# Patient Record
Sex: MALE | Race: BLACK OR AFRICAN AMERICAN | NOT HISPANIC OR LATINO | Employment: FULL TIME | URBAN - METROPOLITAN AREA
[De-identification: names, ages, dates, MRNs, and addresses within clinical notes are randomized per-mention and may not be internally consistent; named-entity substitution may affect disease eponyms.]

---

## 2019-08-02 DIAGNOSIS — Z76.82 ORGAN TRANSPLANT CANDIDATE: Primary | ICD-10-CM

## 2019-08-05 ENCOUNTER — TELEPHONE (OUTPATIENT)
Dept: TRANSPLANT | Facility: CLINIC | Age: 54
End: 2019-08-05

## 2019-08-13 ENCOUNTER — TELEPHONE (OUTPATIENT)
Dept: TRANSPLANT | Facility: CLINIC | Age: 54
End: 2019-08-13

## 2019-08-16 ENCOUNTER — TELEPHONE (OUTPATIENT)
Dept: TRANSPLANT | Facility: CLINIC | Age: 54
End: 2019-08-16

## 2019-08-21 DIAGNOSIS — Z76.82 ORGAN TRANSPLANT CANDIDATE: Primary | ICD-10-CM

## 2019-10-01 ENCOUNTER — HOSPITAL ENCOUNTER (OUTPATIENT)
Dept: RADIOLOGY | Facility: HOSPITAL | Age: 54
Discharge: HOME | End: 2019-10-01
Attending: NURSE PRACTITIONER
Payer: COMMERCIAL

## 2019-10-01 ENCOUNTER — CLINICAL SUPPORT (OUTPATIENT)
Dept: INFECTIOUS DISEASES | Facility: CLINIC | Age: 54
End: 2019-10-01
Payer: COMMERCIAL

## 2019-10-01 ENCOUNTER — OFFICE VISIT (OUTPATIENT)
Dept: TRANSPLANT | Facility: CLINIC | Age: 54
End: 2019-10-01
Payer: COMMERCIAL

## 2019-10-01 VITALS
WEIGHT: 218.06 LBS | HEIGHT: 65 IN | RESPIRATION RATE: 16 BRPM | SYSTOLIC BLOOD PRESSURE: 171 MMHG | DIASTOLIC BLOOD PRESSURE: 63 MMHG | TEMPERATURE: 98 F | OXYGEN SATURATION: 98 % | HEART RATE: 47 BPM | BODY MASS INDEX: 36.33 KG/M2

## 2019-10-01 DIAGNOSIS — N18.5 CKD (CHRONIC KIDNEY DISEASE), STAGE V: Chronic | ICD-10-CM

## 2019-10-01 DIAGNOSIS — Z76.82 ORGAN TRANSPLANT CANDIDATE: ICD-10-CM

## 2019-10-01 DIAGNOSIS — E11.22 CONTROLLED TYPE 2 DIABETES MELLITUS WITH STAGE 5 CHRONIC KIDNEY DISEASE NOT ON CHRONIC DIALYSIS, WITHOUT LONG-TERM CURRENT USE OF INSULIN: Chronic | ICD-10-CM

## 2019-10-01 DIAGNOSIS — N18.5 CONTROLLED TYPE 2 DIABETES MELLITUS WITH STAGE 5 CHRONIC KIDNEY DISEASE NOT ON CHRONIC DIALYSIS, WITHOUT LONG-TERM CURRENT USE OF INSULIN: Chronic | ICD-10-CM

## 2019-10-01 DIAGNOSIS — N18.5 CHRONIC KIDNEY DISEASE (CKD), STAGE V: ICD-10-CM

## 2019-10-01 DIAGNOSIS — Z01.818 PRE-TRANSPLANT EVALUATION FOR CHRONIC KIDNEY DISEASE: ICD-10-CM

## 2019-10-01 DIAGNOSIS — Z01.818 PRE-TRANSPLANT EVALUATION FOR CHRONIC KIDNEY DISEASE: Primary | ICD-10-CM

## 2019-10-01 DIAGNOSIS — I12.9 RENAL HYPERTENSION: Chronic | ICD-10-CM

## 2019-10-01 DIAGNOSIS — G45.9 TIA (TRANSIENT ISCHEMIC ATTACK): Chronic | ICD-10-CM

## 2019-10-01 LAB
BACTERIA #/AREA URNS AUTO: ABNORMAL /HPF
BILIRUB UR QL STRIP: NEGATIVE
CLARITY UR REFRACT.AUTO: CLEAR
COLOR UR AUTO: ABNORMAL
CREAT UR-MCNC: 51 MG/DL (ref 23–375)
GLUCOSE UR QL STRIP: NEGATIVE
HGB UR QL STRIP: ABNORMAL
HYALINE CASTS UR QL AUTO: 1 /LPF
KETONES UR QL STRIP: NEGATIVE
LEUKOCYTE ESTERASE UR QL STRIP: NEGATIVE
MICROSCOPIC COMMENT: ABNORMAL
NITRITE UR QL STRIP: NEGATIVE
PH UR STRIP: 6 [PH] (ref 5–8)
PROT UR QL STRIP: ABNORMAL
PROT UR-MCNC: 199 MG/DL (ref 0–15)
PROT/CREAT UR: 3.9 MG/G{CREAT} (ref 0–0.2)
RBC #/AREA URNS AUTO: 5 /HPF (ref 0–4)
SP GR UR STRIP: 1.01 (ref 1–1.03)
SQUAMOUS #/AREA URNS AUTO: 0 /HPF
URN SPEC COLLECT METH UR: ABNORMAL
WBC #/AREA URNS AUTO: 4 /HPF (ref 0–5)

## 2019-10-01 PROCEDURE — 3044F HG A1C LEVEL LT 7.0%: CPT | Mod: CPTII,S$GLB,TXP, | Performed by: INTERNAL MEDICINE

## 2019-10-01 PROCEDURE — 99203 PR OFFICE/OUTPT VISIT, NEW, LEVL III, 30-44 MIN: ICD-10-PCS | Mod: S$GLB,TXP,, | Performed by: TRANSPLANT SURGERY

## 2019-10-01 PROCEDURE — 3044F PR MOST RECENT HEMOGLOBIN A1C LEVEL <7.0%: ICD-10-PCS | Mod: CPTII,S$GLB,TXP, | Performed by: INTERNAL MEDICINE

## 2019-10-01 PROCEDURE — 99999 PR PBB SHADOW E&M-EST. PATIENT-LVL IV: CPT | Mod: PBBFAC,TXP,, | Performed by: INTERNAL MEDICINE

## 2019-10-01 PROCEDURE — 99204 PR OFFICE/OUTPT VISIT, NEW, LEVL IV, 45-59 MIN: ICD-10-PCS | Mod: 25,S$GLB,TXP, | Performed by: PHYSICIAN ASSISTANT

## 2019-10-01 PROCEDURE — 93978 VASCULAR STUDY: CPT | Mod: TC,TXP

## 2019-10-01 PROCEDURE — 90472 IMMUNIZATION ADMIN EACH ADD: CPT | Mod: S$GLB,TXP,, | Performed by: INTERNAL MEDICINE

## 2019-10-01 PROCEDURE — 90471 FLU VACCINE (QUAD) GREATER THAN OR EQUAL TO 3YO PRESERVATIVE FREE IM: ICD-10-PCS | Mod: S$GLB,TXP,, | Performed by: INTERNAL MEDICINE

## 2019-10-01 PROCEDURE — 90471 IMMUNIZATION ADMIN: CPT | Mod: S$GLB,TXP,, | Performed by: INTERNAL MEDICINE

## 2019-10-01 PROCEDURE — 3008F PR BODY MASS INDEX (BMI) DOCUMENTED: ICD-10-PCS | Mod: CPTII,S$GLB,TXP, | Performed by: INTERNAL MEDICINE

## 2019-10-01 PROCEDURE — 90715 TDAP VACCINE 7 YRS/> IM: CPT | Mod: S$GLB,TXP,, | Performed by: INTERNAL MEDICINE

## 2019-10-01 PROCEDURE — 72170 X-RAY EXAM OF PELVIS: CPT | Mod: TC,TXP

## 2019-10-01 PROCEDURE — 71046 X-RAY EXAM CHEST 2 VIEWS: CPT | Mod: TC,TXP

## 2019-10-01 PROCEDURE — 72170 XR PELVIS ROUTINE AP: ICD-10-PCS | Mod: 26,TXP,, | Performed by: RADIOLOGY

## 2019-10-01 PROCEDURE — 90715 TDAP VACCINE GREATER THAN OR EQUAL TO 7YO IM: ICD-10-PCS | Mod: S$GLB,TXP,, | Performed by: INTERNAL MEDICINE

## 2019-10-01 PROCEDURE — 76770 US EXAM ABDO BACK WALL COMP: CPT | Mod: 59,TC,TXP

## 2019-10-01 PROCEDURE — 3077F PR MOST RECENT SYSTOLIC BLOOD PRESSURE >= 140 MM HG: ICD-10-PCS | Mod: CPTII,S$GLB,TXP, | Performed by: INTERNAL MEDICINE

## 2019-10-01 PROCEDURE — 97802 MEDICAL NUTRITION INDIV IN: CPT | Mod: S$GLB,TXP,, | Performed by: DIETITIAN, REGISTERED

## 2019-10-01 PROCEDURE — 99999 PR PBB SHADOW E&M-EST. PATIENT-LVL IV: ICD-10-PCS | Mod: PBBFAC,TXP,, | Performed by: INTERNAL MEDICINE

## 2019-10-01 PROCEDURE — 90686 IIV4 VACC NO PRSV 0.5 ML IM: CPT | Mod: S$GLB,TXP,, | Performed by: INTERNAL MEDICINE

## 2019-10-01 PROCEDURE — 93978 VASCULAR STUDY: CPT | Mod: 26,TXP,, | Performed by: RADIOLOGY

## 2019-10-01 PROCEDURE — 3078F PR MOST RECENT DIASTOLIC BLOOD PRESSURE < 80 MM HG: ICD-10-PCS | Mod: CPTII,S$GLB,TXP, | Performed by: INTERNAL MEDICINE

## 2019-10-01 PROCEDURE — 90636 HEP A/HEP B VACC ADULT IM: CPT | Mod: S$GLB,TXP,, | Performed by: INTERNAL MEDICINE

## 2019-10-01 PROCEDURE — 3077F SYST BP >= 140 MM HG: CPT | Mod: CPTII,S$GLB,TXP, | Performed by: INTERNAL MEDICINE

## 2019-10-01 PROCEDURE — 76770 US RETROPERITONEAL COMPLETE: ICD-10-PCS | Mod: 26,59,TXP, | Performed by: RADIOLOGY

## 2019-10-01 PROCEDURE — 99205 OFFICE O/P NEW HI 60 MIN: CPT | Mod: S$GLB,TXP,, | Performed by: INTERNAL MEDICINE

## 2019-10-01 PROCEDURE — 71046 XR CHEST PA AND LATERAL: ICD-10-PCS | Mod: 26,TXP,, | Performed by: RADIOLOGY

## 2019-10-01 PROCEDURE — 90670 PNEUMOCOCCAL CONJUGATE VACCINE 13-VALENT LESS THAN 5YO & GREATER THAN: ICD-10-PCS | Mod: S$GLB,TXP,, | Performed by: INTERNAL MEDICINE

## 2019-10-01 PROCEDURE — 99203 OFFICE O/P NEW LOW 30 MIN: CPT | Mod: S$GLB,TXP,, | Performed by: TRANSPLANT SURGERY

## 2019-10-01 PROCEDURE — 71046 X-RAY EXAM CHEST 2 VIEWS: CPT | Mod: 26,TXP,, | Performed by: RADIOLOGY

## 2019-10-01 PROCEDURE — 90686 FLU VACCINE (QUAD) GREATER THAN OR EQUAL TO 3YO PRESERVATIVE FREE IM: ICD-10-PCS | Mod: S$GLB,TXP,, | Performed by: INTERNAL MEDICINE

## 2019-10-01 PROCEDURE — 3078F DIAST BP <80 MM HG: CPT | Mod: CPTII,S$GLB,TXP, | Performed by: INTERNAL MEDICINE

## 2019-10-01 PROCEDURE — 93978 US DOPP ILIACS BILATERAL: ICD-10-PCS | Mod: 26,TXP,, | Performed by: RADIOLOGY

## 2019-10-01 PROCEDURE — 72170 X-RAY EXAM OF PELVIS: CPT | Mod: 26,TXP,, | Performed by: RADIOLOGY

## 2019-10-01 PROCEDURE — 90670 PCV13 VACCINE IM: CPT | Mod: S$GLB,TXP,, | Performed by: INTERNAL MEDICINE

## 2019-10-01 PROCEDURE — 3008F BODY MASS INDEX DOCD: CPT | Mod: CPTII,S$GLB,TXP, | Performed by: INTERNAL MEDICINE

## 2019-10-01 PROCEDURE — 99205 PR OFFICE/OUTPT VISIT, NEW, LEVL V, 60-74 MIN: ICD-10-PCS | Mod: S$GLB,TXP,, | Performed by: INTERNAL MEDICINE

## 2019-10-01 PROCEDURE — 90472 HEPATITIS A HEPATITIS B COMBINED VACCINE IM: ICD-10-PCS | Mod: S$GLB,TXP,, | Performed by: INTERNAL MEDICINE

## 2019-10-01 PROCEDURE — 97802 PR MED NUTR THER, 1ST, INDIV, EA 15 MIN: ICD-10-PCS | Mod: S$GLB,TXP,, | Performed by: DIETITIAN, REGISTERED

## 2019-10-01 PROCEDURE — 99204 OFFICE O/P NEW MOD 45 MIN: CPT | Mod: 25,S$GLB,TXP, | Performed by: PHYSICIAN ASSISTANT

## 2019-10-01 PROCEDURE — 76770 US EXAM ABDO BACK WALL COMP: CPT | Mod: 26,59,TXP, | Performed by: RADIOLOGY

## 2019-10-01 PROCEDURE — 81001 URINALYSIS AUTO W/SCOPE: CPT | Mod: TXP

## 2019-10-01 PROCEDURE — 82570 ASSAY OF URINE CREATININE: CPT | Mod: TXP

## 2019-10-01 PROCEDURE — 90636 HEPATITIS A HEPATITIS B COMBINED VACCINE IM: ICD-10-PCS | Mod: S$GLB,TXP,, | Performed by: INTERNAL MEDICINE

## 2019-10-01 RX ORDER — ATORVASTATIN CALCIUM 40 MG/1
40 TABLET, FILM COATED ORAL NIGHTLY
COMMUNITY

## 2019-10-01 RX ORDER — METOPROLOL TARTRATE 100 MG/1
100 TABLET ORAL 2 TIMES DAILY
COMMUNITY
End: 2022-09-19 | Stop reason: CLARIF

## 2019-10-01 RX ORDER — METOPROLOL SUCCINATE 100 MG/1
100 TABLET, EXTENDED RELEASE ORAL DAILY
COMMUNITY
End: 2019-10-01 | Stop reason: ALTCHOICE

## 2019-10-01 RX ORDER — AMLODIPINE BESYLATE 10 MG/1
10 TABLET ORAL NIGHTLY
Status: ON HOLD | COMMUNITY
End: 2022-12-22 | Stop reason: ALTCHOICE

## 2019-10-01 RX ORDER — ALLOPURINOL 100 MG/1
50 TABLET ORAL DAILY
COMMUNITY

## 2019-10-01 RX ORDER — PIOGLITAZONEHYDROCHLORIDE 30 MG/1
30 TABLET ORAL DAILY
COMMUNITY

## 2019-10-01 RX ORDER — FUROSEMIDE 80 MG/1
80 TABLET ORAL DAILY
COMMUNITY
End: 2020-10-27

## 2019-10-01 RX ORDER — DOXAZOSIN 4 MG/1
4 TABLET ORAL DAILY
COMMUNITY

## 2019-10-01 NOTE — PROGRESS NOTES
received Tdap, Twinrix(first dose), Flu and Prevnar 13 vaccines. Tolerated well. Left unit in NAD.

## 2019-10-01 NOTE — PROGRESS NOTES
"TRANSPLANT NUTRITIONAL ASSESSMENT    Referring Provider: Tata Atkinson MD    Reason for Visit: Pre-kidney transplant work-up (pre-dialysis)    Age: 53 y.o.  Sex: male    There is no problem list on file for this patient.    Past Medical History:   Diagnosis Date    Diabetes mellitus     Hypertension      Lab Results   Component Value Date     10/01/2019    K 4.4 10/01/2019    PHOS 4.2 10/01/2019    CHOL 116 (L) 10/01/2019    HDL 38 (L) 10/01/2019    TRIG 101 10/01/2019    ALBUMIN 3.5 10/01/2019    HGBA1C 5.4 10/01/2019    CALCIUM 8.9 10/01/2019     Other Pertinent Labs: None    Current Outpatient Medications   Medication Sig    allopurinol (ZYLOPRIM) 100 MG tablet Take 100 mg by mouth once daily.    amLODIPine (NORVASC) 10 MG tablet Take 10 mg by mouth every evening.     atorvastatin (LIPITOR) 40 MG tablet Take 40 mg by mouth every evening.     doxazosin (CARDURA) 8 MG Tab Take 8 mg by mouth every 12 (twelve) hours.     furosemide (LASIX) 80 MG tablet Take 80 mg by mouth once daily.    metoprolol tartrate (LOPRESSOR) 100 MG tablet Take 100 mg by mouth 2 (two) times daily.    pioglitazone (ACTOS) 30 MG tablet Take 30 mg by mouth once daily.    rivaroxaban (XARELTO) 15 mg Tab Take 15 mg by mouth daily with dinner or evening meal.     No current facility-administered medications for this visit.      Allergies: Patient has no known allergies.    Ht Readings from Last 1 Encounters:   10/01/19 5' 5" (1.651 m)     Wt Readings from Last 1 Encounters:   10/01/19 98.9 kg (218 lb 0.6 oz)      BMI: Body mass index is 36.28 kg/m².    Usual Weight: 254lb  Weight Change/Time: -36lb x 8 months (intentional)  Current Diet: Low sodium, low sugar  Appetite/Current Intake: good   Exercise/Physical Activity: Exercising regular from March until July of this year, got fistula placed and has had more fatigue/muscle aches since July  Nutritional/Herbal Supplements: None  Potential Food/Medication Interactions: " Amlodipine - avoid natural licorice  Atorvastatin - avoid grapefruit  Chewing/Swallowing Problems: None  Symptoms: none  Assessment of Lab Values: Chol 116, HDL 38  Support System: Wife present, supportive of pt's nutrition care and cooks for pt    Estimated Kcal Need: 1978 kcal (20 kcal/kg)  Estimated Protein Need: 79 gm (0.8 gm/kg)    Nutritional History: Pt reports good appetite. Reports weight loss of 36lb since 2/2019 by following low sodium diet and overall eating more healthy diet. Was also exercising regularly during this time, but stopped in July after AV fistula placement. Also having more fatigue and muscle aches. Eats out 1x/week, tries to order things that are healthy and within sodium recommendations. Both wife and pt cook and do not use salt. Beverages include water. Limits red meat, shrimp for gout. Eats lots of fruit as snacks. Pt provided the following diet recall:  B: oatmeal cooked in water with strawberries, sugar, 1 tsp butter, coffee with honey and creamer  L: salad (Ranch or Bulgarian dressing, lettuce, tomato, cucumber, cheese, croutons) or tuna/turkey sandwich from [employer] or [employer] 1-2x/month  D: baked fish/chicken/pork/turkey (rarely fried), broccoli, salad, small portion rice or red/white beans    Nutritional Diagnoses  Problem: food- and nutrition-related knowledge deficit  Etiology: no previous education on K/Phos restrictions for CKD  Symptoms: diet recall, pt comments    Educational Need? yes  Barriers: none identified  Discussed with: patient and wife  Interventions: Patient taught nutrition information regarding Pre-kidney transplant work-up (pre-dialysis).  Renal Nutrition Therapy packet reviewed (high/low food sources of K, Phos and protein, low sodium and fluid intake, emphasis on moderate protein intake).  Goals/Recommendations: gradual weight loss, choose healthy options when dining out and aerobic exercises as medically able  Actions Taken: instruct/provide written  information  Strategies Used: problem solving, goal setting, motivational interviewing  Patient and/or family comprehend instructions: yes , adherence expected  Outcome: Verbalizes understanding  Monitoring: Contact information provided, will f/u in clinic and communicate with the care team as needed.     Counseling Time: 15 minutes

## 2019-10-01 NOTE — PROGRESS NOTES
Transplant Surgery  Kidney Transplant Recipient Evaluation    Referring Physician: Kamran Adorno  Current Nephrologist: Kamran Adorno    Subjective:     Reason for Visit: evaluate transplant candidacy    History of Present Illness: Chidi Lisa is a 53 y.o. year old male undergoing transplant evaluation.    Dialysis History: Chidi is pre-dialysis.      Transplant History: N/A    Etiology of Renal Disease: Diabetes Mellitus - Type II (based on medical records from referral).    Review of Systems    Objective:     Physical Exam:  Constitutional:   Vitals reviewed: yes   Well-nourished and well-groomed: yes  Eyes:   Sclerae icteric: no   Extraocular movements intact: yes  GI:    Bowel sounds normal: yes   Tenderness: no    If yes, quadrant/location: not applicable   Palpable masses: no    If yes, quadrant/location: not applicable   Hepatosplenomegaly: no   Ascites: no   Hernia: no    If yes, type/location: not applicable   Surgical scars: no    If yes, type/location: not applicable  Resp:   Effort normal: yes   Breath sounds normal: yes    CV:   Regular rate and rhythm: yes   Heart sounds normal: yes   Femoral pulses normal: yes   Extremities edematous: no  Skin:   Rashes or lesions present: no    If yes, describe:not applicable   Jaundice:: no    Musculoskeletal:   Gait normal: yes   Strength normal: yes  Psych:   Oriented to person, place, and time: yes   Affect and mood normal: yes    Additional comments: not applicable    Counseling: We provided Chidi Lisa with a group education session today.  We discussed kidney transplantation at length with him, including risks, potential complications, and alternatives in the management of his renal failure.  The discussion included complications related to anesthesia, bleeding, infection, primary nonfunction, and ATN.  I discussed the typical postoperative course, length of hospitalization, the need for long-term immunosuppression, and the need for long-term  routine follow-up.  I discussed living-donor and -donor transplantation and the relative advantages and disadvantages of each.  I also discussed average waiting times for both living donation and  donation.  I discussed national and center-specific survival rates.  I also mentioned the potential benefit of multicenter listing to candidates listed with centers within more than one organ procurement organization.  All questions were answered.    Final determination of transplant candidacy will be made once evaluation is complete and reviewed by the Kidney & Kidney/Pancreas Selection Committee.         Transplant Surgery - Candidacy   Assessment/Plan:   Chidi Lisa is pre-dialysis with CKD stage 4 (GFR 15-29 mL/min). I see no surgical contraindication to placing a kidney transplant. Based on available information, Chidi Lisa is a suitable kidney transplant candidate.     Eron Diane MD

## 2019-10-01 NOTE — PROGRESS NOTES
INITIAL PATIENT EDUCATION NOTE    Mr. Chidi Lisa was seen in pre-kidney transplant clinic for evaluation for kidney, kidney/pancreas or pancreas only transplant.  The patient attended a group education session that discussed/reviewed the following aspects of transplantation: evaluation and selection committee process, UNOS waitlist management/multiple listings, types of organs offered (KDPI < 85%, KDPI > 85%, PHS increased risk, DCD, HCV+), financial aspects, surgical procedures, dietary instruction pre- and post-transplant, health maintenance pre- and post-transplant, post-transplant hospitalization and outpatient follow-up, potential to participate in a research protocol, and medication management and side effects.  A question and answer session was provided after the presentation.    The patient was seen by all members of the multi-disciplinary team to include: Nephrologist/PA, Surgeon, , Transplant Coordinator, , Pharmacist and Dietician (if applicable).    The patient reviewed and signed all consents for evaluation which were witnessed and sent to scanning into the EPIC chart.    The patient was given an education book and plan for further evaluation based on his individual assessment.      The patient was encouraged to call with any questions or concerns.

## 2019-10-01 NOTE — PROGRESS NOTES
Seen and agree with  Dr. Hebert as documented in his attached note.  I have verified the history and examined the patient.  I concur with the Assessment and plan as outlined.       He was diagnosed with DM in 2003. + DM type II w retinopathy, nephropathy. TIA 3/2016.     He is a suitable kidney transplant candidate. He is highly motivated to lose more weight and has potential donors. He is a suitable candidate for HCV+ kidney transplant and agreeable to HCV offers. He is not a candidate for KDPI >85 d/t weight.  W/U as per Dr. Hebert' note will include getting [facility] records from [facility] w/u.  OK to evaluate LDs while he completes his w/u.

## 2019-10-01 NOTE — PROGRESS NOTES
PHARM.D. PRE-TRANSPLANT NOTE:    This patient's medication therapy was evaluated as part of his pre-transplant evaluation.      The following general pharmacologic concerns were noted: Patient currently anticoagulated with Xarelto - patient with a history of a stroke 2 - 3 years ago, was told AFib may have been the cause of stroke --> recommend to continue lifelong anticoagulation     The following pharmacologic concerns related to HCV therapy were noted: Patient with a possible history of AFib with stroke, per patient not currently in AFib, never taken amio    This patient's medication profile was reviewed for contraindications for DAA Hepatitis C therapy:    [x]  No current inducers of CYP 3A4 or PGP  [x]  No amiodarone on this patient's EMR profile in the last 24 months  [YES - see above]  No past or current atrial fibrillation on this patient's EMR profile       Current Outpatient Medications   Medication Sig Dispense Refill    allopurinol (ZYLOPRIM) 100 MG tablet Take 100 mg by mouth once daily.      amLODIPine (NORVASC) 10 MG tablet Take 10 mg by mouth every evening.       atorvastatin (LIPITOR) 40 MG tablet Take 40 mg by mouth every evening.       doxazosin (CARDURA) 8 MG Tab Take 8 mg by mouth every 12 (twelve) hours.       furosemide (LASIX) 80 MG tablet Take 80 mg by mouth once daily.      metoprolol tartrate (LOPRESSOR) 100 MG tablet Take 100 mg by mouth 2 (two) times daily.      pioglitazone (ACTOS) 30 MG tablet Take 30 mg by mouth once daily.      rivaroxaban (XARELTO) 15 mg Tab Take 15 mg by mouth daily with dinner or evening meal.       No current facility-administered medications for this visit.          Currently Mr Lisa is responsible for preparing / administering this patient's medications on a daily basis.  I am available for consultation and can be contacted, as needed by the other members of the Kidney Transplant team.

## 2019-10-01 NOTE — LETTER
October 2, 2019        Kamran Adorno  [address]  [address]  [place] [address]  Phone: [phone]  Fax: [phone]             [facility]  [address]  [facility] [address]  Phone: [phone]   Patient: Chidi Lisa   MR Number: [id number]   YOB: 1965   Date of Visit: 10/1/2019       Dear Dr. Kamran Adorno    Thank you for referring Chidi Lisa to me for evaluation. Attached you will find relevant portions of my assessment and plan of care.    If you have questions, please do not hesitate to call me. I look forward to following Chidi Lisa along with you.    Sincerely,    Tata Atkinson MD    Enclosure    If you would like to receive this communication electronically, please contact [email or web address] or [phone] to request [employer] Link access.    [employer] is a tool which provides read-only access to select patient information with whom you have a relationship. Its easy to use and provides real time access to review your patients record including encounter summaries, notes, results, and demographic information.    If you feel you have received this communication in error or would no longer like to receive these types of communications, please e-mail [email or web address]

## 2019-10-01 NOTE — PROGRESS NOTES
Pre Transplant Infectious Diseases Consult  Kidney Transplant Recipient Evaluation    Requesting Physician: Kamran Adorno MD    Reason for Visit:  Pre Transplant Evaluation    Organ:  Kidney    Etiology of Kidney Disease:  HTN and DM. Pt is pre dialysis.     History of Prior Transplant:  No    Currently taking immunosuppressants/steroids:  No    History of Splenectomy:  No    Infectious History:  Current/recent infections or currently taking antibiotics?  No  History of recurrent infections (sinuses, throat, bladder/kidneys, intestines, skin, dental, lung, catheter (HD/PD) related, or peritonitis/SBP)?  No  Any major hospitalizations due to infection?  No  If diabetic, history of diabetic foot infection/osteomyelitis?  No  History of shingles?  No  History of STDs (syphilis, viral hepatitis, HIV)?  No  Exposure to TB or ever had a positive TB skin test?  No  History of residence in coccidioides endemic areas ([facility]?  Lived in [place], [place], currently lives in [place], [place]  Any foreign travel?  No  Any associated illness?  No    Social/Environmental:  Occupational:    Animal exposures (dogs, cats, farm animals, bird cages, fish tanks):  Yes - two indoor dogs.   Hobbies (gardening, hike, fish/hunting, etc): none  Consumption of raw/undercooked meat or seafood?  Yes, sushi  Any injectable or smoked recreational drug use?  No    Immunization History:  Childhood vaccines:  Yes  Last Flu shot: not yet   Tetanus/TDAP: >10 years  Hepatitis A: none  Hepatitis B: none  Prevnar-13:none  Pneumovax-23: none  Shingles (Zostavax/Shingrix): none  Meningococcal: none  Other:  none    Serologies:  No results found for: CMVIGGINTERP, HEPAIGG, HEPBCAB, HEPBSAB, HEPBSAG, HEPBCAB, KBH40MFZC, TBGOLDPLUS, RPR, STRONGANTIGG, TOXOIGG, TOXOG, VARICELLAINT     Review of Systems   Constitution: Negative for chills, decreased appetite, fever, malaise/fatigue, night sweats, weight gain and weight loss.   HENT: Negative  for congestion, ear pain, hearing loss, hoarse voice, sore throat and tinnitus.    Eyes: Negative for blurred vision, pain, vision loss in left eye, vision loss in right eye and visual disturbance.   Cardiovascular: Negative for chest pain, dyspnea on exertion, leg swelling and palpitations.   Respiratory: Negative for cough, shortness of breath, sputum production and wheezing.    Skin: Negative for dry skin, itching, rash and suspicious lesions.   Musculoskeletal: Negative for back pain, joint pain, myalgias and neck pain.   Gastrointestinal: Negative for abdominal pain, constipation, diarrhea, heartburn, nausea and vomiting.   Genitourinary: Negative for dysuria, flank pain, frequency, hematuria, hesitancy and urgency.   Neurological: Negative for dizziness, headaches, numbness, paresthesias and weakness.   Psychiatric/Behavioral: Negative for depression and memory loss. The patient does not have insomnia and is not nervous/anxious.      Physical Exam   Constitutional: He is oriented to person, place, and time. He appears well-developed and well-nourished. No distress.   HENT:   Head: Normocephalic and atraumatic.   Mouth/Throat: Oropharynx is clear and moist.   Eyes: Pupils are equal, round, and reactive to light. EOM are normal.   Neck: Normal range of motion. Neck supple.   Cardiovascular: Normal rate, regular rhythm, normal heart sounds and intact distal pulses. Exam reveals no gallop and no friction rub.   No murmur heard.  Pulmonary/Chest: Effort normal and breath sounds normal. No respiratory distress. He has no wheezes. He has no rales. He exhibits no tenderness.   Abdominal: Bowel sounds are normal. He exhibits no distension and no mass. There is no tenderness. There is no guarding.   Musculoskeletal: Normal range of motion. He exhibits no edema or deformity.   Neurological: He is alert and oriented to person, place, and time.   Skin: Skin is warm and dry. No rash noted. He is not diaphoretic. No  erythema.   Psychiatric: He has a normal mood and affect. His behavior is normal. Judgment and thought content normal.   Nursing note and vitals reviewed.           Counseling:   I discussed with the patient the risk for increased susceptibility to infections following transplantation including increased risk for infection right after transplant and if rejection should occur.  The patient has been counseled on the importance of vaccinations including but not limited to a yearly flu vaccine. Patient was also instructed to encourage that family/caretakers receive their flu vaccine yearly. The patient was encouraged to contact us about any problems that may develop after immunizations and possible side effects were reviewed.     Specific guidance has been provided to the patient regarding the patient's occupation, hobbies and activities to avoid future infectious complications. These include but are not limited to: avoiding raw/undercooked meats and seafood, avoiding unpasteurized milk/cheeses, proper (hand) hygiene, contact with animals and appropriate vaccination of animals, use of mosquito/tick precautions, avoiding walking barefoot, avoiding sick contacts, and seeking medical advice prior to foreign travel (specifically developing countries).     Transplant Candidacy: Based on available information, there are no identified significant barriers to transplantation from an infectious disease standpoint pending acceptable serologies and subject to recommendations below.     Final determination of transplant candidacy will be made once evaluation is complete and reviewed by the Transplant Selection Committee.      ID recommendations:      Quantiferon gold, HIV, Strongyloides, and RPR pending. If positive, please refer to ID clinic.    Vaccines recommended:  1. Influenza  2. prevnar  3. twinrix today, 1 month, 6 months  4. tdap    rx given to complete the twinrix series and shingrix series. All questions answered.

## 2019-10-01 NOTE — PATIENT INSTRUCTIONS
From your doctor:  Thank you for visiting us today and considering kidney transplantation.    -Keep up the great work with your weight loss!  -Please have living donors call as soon as they are ready.  Please feel free to contact us with any questions or concerns.  Regards,  Dr. Ivette Morataya

## 2019-10-01 NOTE — PROGRESS NOTES
Transplant Nephrology  Kidney Transplant Recipient Evaluation    Referring Physician: Kamran Adorno  Current Nephrologist: Kamran Adorno    SUBJECTIVE     CC:   Six monthly/Annual reassessment of kidney transplant candidacy.    HPI:  Mr. Lisa is a 53 y.o. year old Black or  male who has presented to be evaluated as a potential kidney transplant recipient.  He has advanced kidney disease secondary to diabetic nephropathy and HTN. He was diagnosed with DM  In 2003 and HTN [id number]. He has never been on insulin according to him. Patient is currently pre-dialysis. He is CKD stage 5 with eGFR 10.9 ml/min he was dx with CKD in12/2018. He voids 7-8 time per day and makes about 1-2 lts. He urinates > 2 lts of urine per patient a day.  He denies any history of falls or the use of any canes or for pronged cane.  He has a history of chronic react pain he endorses as sciatica he denies any NSAID use.  Patient states that he is doing well. He is very active but does endorse fatigue sx. He works full time and at [employer] as a  so he wal walks a lot. He is able to keep up with yard work and he de weeds.  He endorses doing groceries in being able to walk all great supermarket stores without the need of electrical cart.  He he states he has has retinopathy and is followed by Ophthalmology very closely.  In addition he also has neuropathy.  He endorses foamy urine as well.  On March 30, 2016 he presented to the ER with slurred speech had a tele neurology consult in was diagnosed with a TIA.  He did not have any focal neurological deficit despite done slurred speech all the symptoms started by him not remembering what to do at the AT to withdraw cash.  Finally he was able to remember and was able to get his cast from the ATN he was diagnosed with a TIA.  The memory loss was transient and safe.   he denies any CP, SOB,  orthopnea, PND or syncope. The appetite is good and denies any  nausea, vomiting, diarrhea, abdominal pain, melena. The bowel movements are regular and weight is stable. he has no cough, fever, chills, weight loss or night sweats.  he has no focal weakness, sensory loss, numbness or paresthesias.   Patient denies any recent ER visit, hospitalization or recent history of coronary artery disease, stroke, seizure disorder, chronic obstructive pulmonary disease, liver disease, kidney stones, gallstones, deep venous thrombosis, pulmonary embolism, recurrent urinary tract infections or malignancies.    Functional Status: He does not exercise but has recently started an exercise routine and has lost a significant amount of weight. He had a lifestyle change and is doing well.  He  Endorses climbing a 3 flight of stairs prior to stopping or getting chest pain, SOB, or claudication.   Previous Transplant: no  Previous Blood Transfusion: yes   Denies any falls.   Previous neurogenic bladder/ urine incontinence: No  Anticoagulation/ antiplatelet therapy and reason:No   Potential Donor: 2 potential donors    Past Medical History:   Diagnosis Date    Diabetes mellitus     Hypertension     TIA (transient ischemic attack) 10/4/2019     Past Surgical History:   Procedure Laterality Date    HERNIA REPAIR       History reviewed. No pertinent family history.     Social History     Socioeconomic History    Marital status:      Spouse name: Not on file    Number of children: Not on file    Years of education: Not on file    Highest education level: Not on file   Occupational History    Not on file   Social Needs    Financial resource strain: Not on file    Food insecurity:     Worry: Not on file     Inability: Not on file    Transportation needs:     Medical: Not on file     Non-medical: Not on file   Tobacco Use    Smoking status: Current Some Day Smoker    Smokeless tobacco: Never Used   Substance and Sexual Activity    Alcohol use: Yes     Comment: 3x week 6 pack    Drug  use: No    Sexual activity: Not on file   Lifestyle    Physical activity:     Days per week: Not on file     Minutes per session: Not on file    Stress: Not on file   Relationships    Social connections:     Talks on phone: Not on file     Gets together: Not on file     Attends Muslim service: Not on file     Active member of club or organization: Not on file     Attends meetings of clubs or organizations: Not on file     Relationship status: Not on file   Other Topics Concern    Not on file   Social History Narrative    Not on file     Current Outpatient Medications   Medication Sig Dispense Refill    allopurinol (ZYLOPRIM) 100 MG tablet Take 100 mg by mouth once daily.      amLODIPine (NORVASC) 10 MG tablet Take 10 mg by mouth every evening.       atorvastatin (LIPITOR) 40 MG tablet Take 40 mg by mouth every evening.       doxazosin (CARDURA) 8 MG Tab Take 8 mg by mouth every 12 (twelve) hours.       furosemide (LASIX) 80 MG tablet Take 80 mg by mouth once daily.      metoprolol tartrate (LOPRESSOR) 100 MG tablet Take 100 mg by mouth 2 (two) times daily.      pioglitazone (ACTOS) 30 MG tablet Take 30 mg by mouth once daily.      rivaroxaban (XARELTO) 15 mg Tab Take 15 mg by mouth daily with dinner or evening meal.       No current facility-administered medications for this visit.      Review of Systems  Constitutional: Negative for fever, appetite change and fatigue.   HENT: Negative for hearing loss, sore throat and mouth sores.   Eyes: Negative for photophobia, pain and visual disturbance.   Respiratory: Negative for cough, chest tightness, shortness of breath and wheezing.   Cardiovascular: Negative for chest pain, palpitations and leg swelling.   Gastrointestinal: Negative for nausea, vomiting, abdominal pain, diarrhea, constipation, blood in stool and abdominal distention.   Genitourinary: Negative for dysuria, urgency, frequency, hematuria, decreased urine volume, difficulty  urinating  Musculoskeletal: Negative for back pain, joint swelling, arthralgias and gait problem.   Skin: Negative for pallor, rash and wound.   Neurological: Negative for dizziness, tremors, syncope, weakness, light-headedness and headaches.   Hematological: Negative for adenopathy. Does not bruise/bleed easily.   Psychiatric/Behavioral: Negative for confusion, sleep disturbance and dysphoric mood. The patient is not nervous/anxious.     OBJECTIVE       Body mass index is 36.28 kg/m².    Vitals:    10/01/19 0720   BP: (!) 171/63   Pulse: (!) 47   Resp: 16   Temp: 98 °F (36.7 °C)       Physical Exam  General: No acute distress, well groomed  HEENT: Normocephalic, atraumatic, PERRLA, sclera anicteric, conjunctiva/corneas clear, EOM's intact bilaterally, external inspection of ears and nose normal, moist mucous membranes, no oral ulcerations/lesions   Neck: Supple, symmetrical, trachea midline, no masses, thyroid is normal without nodules or enlargement   Respiratory: Clear to auscultation bilaterally, respirations unlabored, no rales/rhonchi/wheezing   Cardiovacular: Regular rate and rhythm, S1, S2 normal, no murmurs, no rubs or gallops, no carotid bruits, no JVD,   Gastrointestinal: Central obesity, Soft, non-tender, bowel sounds normal, no masses, no palpable organomegaly  Extremities: No clubbing or cyanosis of upper extremities bilaterally, no pedal edema bilaterally; +2 bilateral femoral, and dorsalis pedis pulses  Skin: warm and dry; no rash on exposed skin  Lymph nodes: Cervical and supraclavicular nodes normal   Neurologic: No focal neurologic deficits. alert and oriented x 3   Musculoskeletal: moves all extremities without difficulty, FROM, 5/5 strength, ambulates without an assistive device  Psychiatric: Normal mood and affect. Responds appropriately to questions.    Labs:  Lab Results   Component Value Date    WBC 4.34 10/01/2019    HGB 8.1 (L) 10/01/2019    HCT 27.2 (L) 10/01/2019     10/01/2019     K 4.4 10/01/2019     10/01/2019    CO2 24 10/01/2019    BUN 64 (H) 10/01/2019    CREATININE 6.2 (H) 10/01/2019    EGFRNONAA 9.4 (A) 10/01/2019    CALCIUM 8.9 10/01/2019    PHOS 4.2 10/01/2019    ALBUMIN 3.5 10/01/2019    AST 21 10/01/2019    ALT 12 10/01/2019    UTPCR 3.90 (H) 10/01/2019    .0 (H) 10/01/2019       Lab Results   Component Value Date    BILIRUBINUA Negative 10/01/2019    PROTEINUA 2+ (A) 10/01/2019    NITRITE Negative 10/01/2019    RBCUA 5 (H) 10/01/2019    WBCUA 4 10/01/2019     Labs were reviewed with the patient.      ASSESSMENT     1. Pre-transplant evaluation for chronic kidney disease    2. Chronic kidney disease (CKD), stage V    3. Controlled type 2 diabetes mellitus with stage 5 chronic kidney disease not on chronic dialysis, without long-term current use of insulin    4. CKD (chronic kidney disease), stage V    5. Renal hypertension    6. TIA (transient ischemic attack)        PLAN     Transplant Candidacy:    Mr. Lisa is a a suitable for kidney transplantation.   Meets center eligibility for accepting HCV+ donor offer - yes.  Patient educated on HCV+ donors. Chidi is willing to accept HCV+ donor offer - yes   Patient is a candidate for KDPI > 85 kidney donor offer - no.     Kidney allocation scheme was also discussed with the patient. Kidney donor profile index (KPDI) and estimated post-transplant survival scores (EPTS) were reviewed. The benefits and risks of accepting a kidney with KDPI > 85% were explained to the patient. Patient verbalized understanding and consented to accept a kidney with KDPI > 85%. No secondary to weight  Exercise: reminded the patient of the importance of regular exercise for weight management, blood sugar and blood pressure management.  I also explained exercise has been shown to improve cardiovascular health, energy level, and sleep hygiene.       Encouraged her to have any potential living donors contact the living donor coordinator.      Discussed with Dr Anne Hebert MD  Transplant Nephrology Fellow        Follow up:  In addition to the tests mentioned above, the patient will continue to have reevaluation as per the standing pre-kidney transplant protocol:   1. Monthly blood for PRA   2. Annual return to Clinic, except HIV Positive, > 65 years of age, or pancreas transplant candidates who will be scheduled to see transplant every 6 months while in pre-transpalnt phase.   3. Annual re-testing: CXR, EKG, mammograms for women over 40 and PSA for males over 40. cardiology follow-up as recommended by initial cardiology pre-transplant evaluation.   4. Renal Ultrasound every 2 years.   5. 2D ECHO w/ CFD  6.  Will obtain [facility] medical records for TIA      Counselling:  We discussed various aspects of kidney transplantation including transplant surgery, immunosuppressive medications and the need for close follow up. We also discussed side effects of immunosuppression including weight gain, hypertension, hyperlipidemia, new-onset diabetes after transplantation, infections and malignancies, especially skin cancers and lymphomas. I also reviewed the risk of acute rejection, vascular thrombosis, recurrent disease and potential transmission of infections such as hepatitis and HIV. I informed the patient that the average time on the wait list in the [facility] is between 3 to 5 years.     UNOS Patient Status  Functional Status: 80% - Normal activity with effort: some symptoms of disease  Physical Capacity: No Limitations

## 2019-10-02 NOTE — PROGRESS NOTES
Transplant Recipient Adult Psychosocial Assessment    Chidi Lisa  [address] [address] [place] [facility]  Telephone Information:   Mobile [phone]   Home  [phone] (home)  Work  There is no work phone number on file.  E-mail  No e-mail address on record    Sex: male  YOB: 1965  Age: 53 y.o.     Encounter Date: 10/1/2019  [place] Citizen: yes  Primary Language: English   Needed: no   Transportation: pt reports does drive/own car. Pt reports drives self for all medical appointments.    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 Reports does have FMLA. [phone]    Family/Social Support:   Number of dependents/: Pt denies  Marital history:  x 2.  to Ashley Lisa for 17 years.  Other family dynamics: Pt reports parents Lindsey and Chidi living and well in [facility]. Pt reports is working full time and lives with supportive full time employed wife Ashley in [place]. Pt reports having biological and step children and reports step daughters Delicia and Tatiana live nearby in [place] and will be able to assist with transplant as needed. Pt reports supportive biological son Juno Bowman and step son Gio Shields live away and may be able to assist with transplant if needed. Pt's wife Ashley in pre transplant appointments and reports will be pt's primary transplant caregiver with pt's biological and step children as back up transplant caregivers.    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 Reports does have FMLA. [phone]    Do you and your caregivers have access to reliable transportation? yes  PRIMARY CAREGIVER: Ashley Lisa, wife, will be primary caregiver, phone number  [phone].     provided in-depth information to patient and  caregiver regarding pre- and post-transplant caregiver role.   strongly encourages patient and caregiver to have concrete plan regarding post-transplant care giving, including back-up caregiver(s) to ensure care giving needs are met as needed.    Patient and Caregiver states understanding all aspects of caregiver role/commitment and is able/willing/committed to being caregiver to the fullest extent necessary.    Patient and Caregiver verbalizes understanding of the education provided today and caregiver responsibilities.         remains available. Patient and Caregiver agree to contact  in a timely manner if concerns arise.      Able to take time off work without financial concerns: yes. Wife reports has PTO and will apply FMLA. Pt reports will apply for FMLA and has STD/LTD and PTO through work that can be used at transplant.    Additional Significant Others who will Assist with Transplant:  Delicia Morocho, 37 yo step daughter, Juan MCKINLEY, does drive/own car. [phone]  Tatiana Morocho, 35 yo step daughter, Juan MCKINLEY, does drive/own car. [phone]    Living Will: no  Healthcare Power of : no  Advance Directives on file: <<no information> per medical record.  Verbally reviewed LW/HCPA information.   provided patient with copy of LW/HCPA documents and provided education on completion of forms.    Living Donors: Education and resource information given to patient.    Highest Education Level: Attended College/Technical School  Reading Ability: college  Reports difficulty with: N/A  Learns Best By:  Reading about, seeing and doing new task until proficient     Status: no  VA Benefits: no     Working for Income: yes  If yes, working activity level: Working Full Time  Patient reports is working full time in [employer] for [employer] for 6 years. Pt reports net monthly income $3600. Pt reports having STD/LTD, PTO and can apply for FMLA for  transplant.    Spouse/Significant Other Employment: Wife Ashley reports work full time as supervisor at [employer]. Wife reports earns $2000 net and reports having PTO and will be able to apply for [facility] for transplant.    Disabled: no    Monthly Income:  Pt reports earns $3600 net and Wife Ashley reports earns $2000 net   Able to afford all costs now and if transplanted, including medications: yes  Patient and Caregiver verbalizes understanding of personal responsibilities related to transplant costs and the importance of having a financial plan to ensure that patients transplant costs are fully covered.       provided fundraising information/education. Patient and Caregiververbalizes understanding.   remains available.    Insurance:   Payor/Plan Subscr  Sex Relation Sub. Ins. ID Effective Group Num   1. BLUE CROSS BL* YARELY EARL 1965 Male  [id number]                                   [address] [address]     Primary Insurance (for UNOS reporting): Private Insurance BCBS thru pt's employer   Secondary Insurance (for UNOS reporting): None Pt reports is pre dialysis and not on Medicare. Medicare book provided today.  Patient and Caregiver verbalizes clear understanding that patient may experience difficulty obtaining and/or be denied insurance coverage post-surgery. This includes and is not limited to disability insurance, life insurance, health insurance, burial insurance, long term care insurance, and other insurances.      Patient and Caregiver also reports understanding that future health concerns related to or unrelated to transplantation may not be covered by patient's insurance.  Resources and information provided and reviewed.     Patient and Caregiver provides verbal permission to release any necessary information to outside resources for patient care and discharge planning.  Resources and information provided are reviewed.      Nephrologist:  Dr. Kamran Adorno,  [phone]. Pt reports is highly compliant with all medical appointments and instructions. Nephrology compliance update needed and form sent to office for completion.    Dialysis Adherence: Pt reports is pre dialysis at this time.    Infusion Service: patient utilizing? no  Home Health: patient utilizing? no  DME: yes bp cuff, glucometer  Pulmonary/Cardiac Rehab: pt denies   ADLS:  independent    Adherence:   Pt reports high compliance with all medical appointments and instructions.  Adherence education and counseling provided.     Per History Section:  Past Medical History:   Diagnosis Date    Diabetes mellitus     Hypertension      Social History     Tobacco Use    Smoking status: Current Some Day Smoker    Smokeless tobacco: Never Used   Substance Use Topics    Alcohol use: Yes     Comment: 3x week 6 pack     Social History     Substance and Sexual Activity   Drug Use No     Social History     Substance and Sexual Activity   Sexual Activity Not on file       Per Today's Psychosocial:  Please review above table for patient's reported substance use history.    Patient and Caregiver states clear understanding of the potential impact of substance use as it relates to transplant candidacy and is aware of possible random substance screening.  Substance abstinence/cessation counseling, education and resources provided and reviewed.     Arrests/DWI/Treatment/Rehab: patient denies    Psychiatric History:    Mental Health: pt denies any mental health history or current mental health concerns  Psychiatrist/Counselor: pt denies  Medications:  Pt denies  Suicide/Homicide Issues: pt denies any si/hi history  Safety at home: pt reports is living in safe home environment with no abuse.    Knowledge: Patient and Caregiver states having clear understanding and realistic expectations regarding the potential risks and potential benefits of organ transplantation and organ donation and agrees to discuss with health care team  members and support system members, as well as to utilize available resources and express questions and/or concerns in order to further facilitate the pt informed decision-making.  Resources and information provided and reviewed.    Patient and Caregiver is aware of [facility]'s affiliation and/or partnership with agencies in home health care, LTAC, SNF, [facility], and other hospitals and clinics.    Understanding: Patient and Caregiver reports having a clear understanding of the many lifetime commitments involved with being a transplant recipient, including costs, compliance, medications, lab work, procedures, appointments, concrete and financial planning, preparedness, timely and appropriate communication of concerns, abstinence (ETOH, tobacco, illicit non-prescribed drugs), adherence to all health care team recommendations, support system and caregiver involvement, appropriate and timely resource utilization and follow-through, mental health counseling as needed/recommended, and patient and caregiver responsibilities.  Social Service Handbook, resources and detailed educational information provided and reviewed.  Educational information provided.    Patient and Caregiver also reports current and expected compliance with health care regime and states having a clear understanding of the importance of compliance.      Patient and Caregiver reports a clear understanding that risks and benefits may be involved with organ transplantation and with organ donation.       Patient and Caregiver also reports clear understanding that psychosocial risk factors may affect patient, and include but are not limited to feelings of depression, generalized anxiety, anxiety regarding dependence on others, post traumatic stress disorder, feelings of guilt and other emotional and/or mental concerns, and/or exacerbation of existing mental health concerns.  Detailed resources provided and discussed.      Patient and Caregiver agrees to access  appropriate resources in a timely manner as needed and/or as recommended, and to communicate concerns appropriately.  Patient and Caregiver also reports a clear understanding of treatment options available.     Patient and Caregiver received education in a group setting.   reviewed education, provided additional information, and answered questions.    Feelings or Concerns: Pt reports high motivation to pursue organ transplant.    Coping: Pt reports is coping well over all with kidney disease and need for organ transplant. Pt reports aleta best through shooting pool on Friday, watching sports on Sunday and staying engaged with full time work.    Goals: pt reports hope for successful organ transplant so he may never be placed on dialysis.  Patient referred to Vocational Rehabilitation.    Interview Behavior: Patient and Caregiver presents as alert and oriented x 4, pleasant, good eye contact, well groomed, recall good, concentration/judgement good, average intelligence, calm, communicative, cooperative and asking and answering questions appropriately. Pt's supportive wife Ashley in session with pt's permission.         Transplant Social Work - Candidacy  Assessment/Plan:     Psychosocial Suitability: Patient presents as a suitable candidate for kidney transplant at this time. Based on psychosocial risk factors, patient presents as low risk, due to patient denying any psychosocial barriers to organ transplant. Pt reports having organ transplant caregiver/transportation plan, medical insurance plan and plan to afford transplant costs all in place. Pt reports high medical compliance with appointments and instructions within last 3 months.    Recommendations/Additional Comments: Nephrology compliance update needed and form sent to unit for completion. Pt reports lives away and will need transplant lodging; lodging reviewed and discussed in detail. Pt reports he and caregivers work and may need employer  paperwork completed for any time missed due to transplant. Pt reports is pre dialysis and not on Medicare; Medicare book provided today.    Final determination of transplant candidacy will be made once work up is complete and reviewed by the selection committee.    Lalitha SORENSON LCSW

## 2019-10-04 PROBLEM — E11.29 CONTROLLED TYPE 2 DIABETES MELLITUS WITH KIDNEY COMPLICATION, WITHOUT LONG-TERM CURRENT USE OF INSULIN: Chronic | Status: ACTIVE | Noted: 2019-10-04

## 2019-10-04 PROBLEM — G45.9 TIA (TRANSIENT ISCHEMIC ATTACK): Chronic | Status: ACTIVE | Noted: 2019-10-04

## 2019-10-04 PROBLEM — I12.9 RENAL HYPERTENSION: Chronic | Status: ACTIVE | Noted: 2019-10-04

## 2019-10-04 PROBLEM — N18.5 CKD (CHRONIC KIDNEY DISEASE), STAGE V: Chronic | Status: ACTIVE | Noted: 2019-10-04

## 2019-10-11 ENCOUNTER — TELEPHONE (OUTPATIENT)
Dept: CARDIOLOGY | Facility: CLINIC | Age: 54
End: 2019-10-11

## 2019-10-15 ENCOUNTER — CLINICAL SUPPORT (OUTPATIENT)
Dept: CARDIOLOGY | Facility: CLINIC | Age: 54
End: 2019-10-15
Attending: NURSE PRACTITIONER
Payer: COMMERCIAL

## 2019-10-15 ENCOUNTER — HOSPITAL ENCOUNTER (OUTPATIENT)
Dept: CARDIOLOGY | Facility: CLINIC | Age: 54
Discharge: HOME | End: 2019-10-15
Attending: NURSE PRACTITIONER
Payer: COMMERCIAL

## 2019-10-15 ENCOUNTER — LAB VISIT (OUTPATIENT)
Dept: LAB | Facility: HOSPITAL | Age: 54
End: 2019-10-15
Payer: COMMERCIAL

## 2019-10-15 VITALS
DIASTOLIC BLOOD PRESSURE: 76 MMHG | WEIGHT: 218 LBS | HEIGHT: 65 IN | BODY MASS INDEX: 36.32 KG/M2 | SYSTOLIC BLOOD PRESSURE: 158 MMHG | HEART RATE: 63 BPM

## 2019-10-15 VITALS — HEIGHT: 65 IN | BODY MASS INDEX: 36.32 KG/M2 | WEIGHT: 218 LBS

## 2019-10-15 DIAGNOSIS — Z76.82 ORGAN TRANSPLANT CANDIDATE: ICD-10-CM

## 2019-10-15 LAB
ABO + RH BLD: NORMAL
ASCENDING AORTA: 4.17 CM
AV INDEX (PROSTH): 0.41
AV MEAN GRADIENT: 16 MMHG
AV PEAK GRADIENT: 31 MMHG
AV VALVE AREA: 2.02 CM2
AV VELOCITY RATIO: 0.4
BSA FOR ECHO PROCEDURE: 2.13 M2
CV ECHO LV RWT: 0.55 CM
CV PHARM DOSE: 0.4 MG
CV STRESS BASE HR: 57 BPM
DIASTOLIC BLOOD PRESSURE: 73 MMHG
DOP CALC AO PEAK VEL: 2.77 M/S
DOP CALC AO VTI: 72.16 CM
DOP CALC LVOT AREA: 5 CM2
DOP CALC LVOT DIAMETER: 2.52 CM
DOP CALC LVOT PEAK VEL: 1.1 M/S
DOP CALC LVOT STROKE VOLUME: 146.06 CM3
DOP CALCLVOT PEAK VEL VTI: 29.3 CM
E WAVE DECELERATION TIME: 226.68 MSEC
E/A RATIO: 0.96
E/E' RATIO: 17.53 M/S
ECHO LV POSTERIOR WALL: 1.36 CM (ref 0.6–1.1)
END DIASTOLIC INDEX-HIGH: 170 ML/M2
END SYSTOLIC INDEX-HIGH: 70 ML/M2
FRACTIONAL SHORTENING: 36 % (ref 28–44)
INTERVENTRICULAR SEPTUM: 1.27 CM (ref 0.6–1.1)
IVRT: 0.06 MSEC
LA MAJOR: 6.66 CM
LA MINOR: 6.71 CM
LA WIDTH: 5.48 CM
LEFT ATRIUM SIZE: 4.94 CM
LEFT ATRIUM VOLUME INDEX: 75 ML/M2
LEFT ATRIUM VOLUME: 153.82 CM3
LEFT INTERNAL DIMENSION IN SYSTOLE: 3.2 CM (ref 2.1–4)
LEFT VENTRICLE DIASTOLIC VOLUME INDEX: 56.84 ML/M2
LEFT VENTRICLE DIASTOLIC VOLUME: 116.64 ML
LEFT VENTRICLE MASS INDEX: 129 G/M2
LEFT VENTRICLE SYSTOLIC VOLUME INDEX: 20 ML/M2
LEFT VENTRICLE SYSTOLIC VOLUME: 40.98 ML
LEFT VENTRICULAR INTERNAL DIMENSION IN DIASTOLE: 4.97 CM (ref 3.5–6)
LEFT VENTRICULAR MASS: 263.69 G
LV LATERAL E/E' RATIO: 18.63 M/S
LV SEPTAL E/E' RATIO: 16.56 M/S
MV PEAK A VEL: 1.55 M/S
MV PEAK E VEL: 1.49 M/S
NUC REST DIASTOLIC VOLUME INDEX: 208
NUC REST EJECTION FRACTION: 57
NUC REST SYSTOLIC VOLUME INDEX: 88
OHS CV CPX 85 PERCENT MAX PREDICTED HEART RATE MALE: 142
OHS CV CPX MAX PREDICTED HEART RATE: 167
OHS CV CPX PATIENT IS FEMALE: 0
OHS CV CPX PATIENT IS MALE: 1
OHS CV CPX PEAK HEAR RATE: 58 BPM
OHS CV CPX PERCENT MAX PREDICTED HEART RATE ACHIEVED: 35
OHS CV CPX RATE PRESSURE PRODUCT PRESENTING: 9234
PISA TR MAX VEL: 3.93 M/S
PULM VEIN S/D RATIO: 1.06
PV PEAK D VEL: 0.95 M/S
PV PEAK S VEL: 1.01 M/S
RA MAJOR: 6.09 CM
RA PRESSURE: 8 MMHG
RA WIDTH: 4.29 CM
RETIRED EF AND QEF - SEE NOTES: 51 %
RIGHT VENTRICULAR END-DIASTOLIC DIMENSION: 4.36 CM
RV TISSUE DOPPLER FREE WALL SYSTOLIC VELOCITY 1 (APICAL 4 CHAMBER VIEW): 20.01 CM/S
SINUS: 3.85 CM
STJ: 3.29 CM
STRESS ECHO TARGET HR: 141.95 BPM
SYSTOLIC BLOOD PRESSURE: 162 MMHG
TDI LATERAL: 0.08 M/S
TDI SEPTAL: 0.09 M/S
TDI: 0.09 M/S
TR MAX PG: 62 MMHG
TRICUSPID ANNULAR PLANE SYSTOLIC EXCURSION: 3.39 CM
TV REST PULMONARY ARTERY PRESSURE: 70 MMHG

## 2019-10-15 PROCEDURE — 93015 CV STRESS TEST SUPVJ I&R: CPT | Mod: S$GLB,TXP,, | Performed by: INTERNAL MEDICINE

## 2019-10-15 PROCEDURE — 93306 TTE W/DOPPLER COMPLETE: CPT | Mod: S$GLB,TXP,, | Performed by: INTERNAL MEDICINE

## 2019-10-15 PROCEDURE — 78452 STRESS TEST WITH MYOCARDIAL PERFUSION (CUPID ONLY): ICD-10-PCS | Mod: S$GLB,TXP,, | Performed by: INTERNAL MEDICINE

## 2019-10-15 PROCEDURE — 93015 STRESS TEST WITH MYOCARDIAL PERFUSION (CUPID ONLY): ICD-10-PCS | Mod: S$GLB,TXP,, | Performed by: INTERNAL MEDICINE

## 2019-10-15 PROCEDURE — 99999 PR PBB SHADOW E&M-EST. PATIENT-LVL I: CPT | Mod: PBBFAC,TXP,,

## 2019-10-15 PROCEDURE — 78452 HT MUSCLE IMAGE SPECT MULT: CPT | Mod: S$GLB,TXP,, | Performed by: INTERNAL MEDICINE

## 2019-10-15 PROCEDURE — A9502 TC99M TETROFOSMIN: HCPCS | Mod: S$GLB,TXP,, | Performed by: INTERNAL MEDICINE

## 2019-10-15 PROCEDURE — 36415 COLL VENOUS BLD VENIPUNCTURE: CPT | Mod: TXP

## 2019-10-15 PROCEDURE — 93306 TRANSTHORACIC ECHO (TTE) COMPLETE (CUPID ONLY): ICD-10-PCS | Mod: S$GLB,TXP,, | Performed by: INTERNAL MEDICINE

## 2019-10-15 PROCEDURE — A9502 STRESS TEST WITH MYOCARDIAL PERFUSION (CUPID ONLY): ICD-10-PCS | Mod: S$GLB,TXP,, | Performed by: INTERNAL MEDICINE

## 2019-10-15 PROCEDURE — 99999 PR PBB SHADOW E&M-EST. PATIENT-LVL I: ICD-10-PCS | Mod: PBBFAC,TXP,,

## 2019-10-15 PROCEDURE — 86901 BLOOD TYPING SEROLOGIC RH(D): CPT | Mod: TXP

## 2019-10-15 RX ORDER — REGADENOSON 0.08 MG/ML
0.4 INJECTION, SOLUTION INTRAVENOUS
Status: COMPLETED | OUTPATIENT
Start: 2019-10-15 | End: 2019-10-15

## 2019-10-15 RX ADMIN — REGADENOSON 0.4 MG: 0.08 INJECTION, SOLUTION INTRAVENOUS at 09:10

## 2019-11-04 ENCOUNTER — TELEPHONE (OUTPATIENT)
Dept: TRANSPLANT | Facility: CLINIC | Age: 54
End: 2019-11-04

## 2019-11-05 ENCOUNTER — TELEPHONE (OUTPATIENT)
Dept: TRANSPLANT | Facility: CLINIC | Age: 54
End: 2019-11-05

## 2019-11-12 ENCOUNTER — OFFICE VISIT (OUTPATIENT)
Dept: CARDIOLOGY | Facility: CLINIC | Age: 54
End: 2019-11-12
Payer: COMMERCIAL

## 2019-11-12 VITALS
DIASTOLIC BLOOD PRESSURE: 70 MMHG | BODY MASS INDEX: 34.05 KG/M2 | SYSTOLIC BLOOD PRESSURE: 157 MMHG | WEIGHT: 216.94 LBS | HEIGHT: 67 IN | HEART RATE: 52 BPM

## 2019-11-12 DIAGNOSIS — E11.22 CONTROLLED TYPE 2 DIABETES MELLITUS WITH STAGE 5 CHRONIC KIDNEY DISEASE NOT ON CHRONIC DIALYSIS, WITHOUT LONG-TERM CURRENT USE OF INSULIN: Chronic | ICD-10-CM

## 2019-11-12 DIAGNOSIS — I27.20 PULMONARY HTN: Primary | ICD-10-CM

## 2019-11-12 DIAGNOSIS — N18.5 CONTROLLED TYPE 2 DIABETES MELLITUS WITH STAGE 5 CHRONIC KIDNEY DISEASE NOT ON CHRONIC DIALYSIS, WITHOUT LONG-TERM CURRENT USE OF INSULIN: Chronic | ICD-10-CM

## 2019-11-12 DIAGNOSIS — I12.9 RENAL HYPERTENSION: Chronic | ICD-10-CM

## 2019-11-12 DIAGNOSIS — N18.5 CKD (CHRONIC KIDNEY DISEASE), STAGE V: Chronic | ICD-10-CM

## 2019-11-12 PROCEDURE — 3008F PR BODY MASS INDEX (BMI) DOCUMENTED: ICD-10-PCS | Mod: CPTII,S$GLB,TXP, | Performed by: INTERNAL MEDICINE

## 2019-11-12 PROCEDURE — 3077F PR MOST RECENT SYSTOLIC BLOOD PRESSURE >= 140 MM HG: ICD-10-PCS | Mod: CPTII,S$GLB,TXP, | Performed by: INTERNAL MEDICINE

## 2019-11-12 PROCEDURE — 3008F BODY MASS INDEX DOCD: CPT | Mod: CPTII,S$GLB,TXP, | Performed by: INTERNAL MEDICINE

## 2019-11-12 PROCEDURE — 3078F PR MOST RECENT DIASTOLIC BLOOD PRESSURE < 80 MM HG: ICD-10-PCS | Mod: CPTII,S$GLB,TXP, | Performed by: INTERNAL MEDICINE

## 2019-11-12 PROCEDURE — 3044F HG A1C LEVEL LT 7.0%: CPT | Mod: CPTII,S$GLB,TXP, | Performed by: INTERNAL MEDICINE

## 2019-11-12 PROCEDURE — 3044F PR MOST RECENT HEMOGLOBIN A1C LEVEL <7.0%: ICD-10-PCS | Mod: CPTII,S$GLB,TXP, | Performed by: INTERNAL MEDICINE

## 2019-11-12 PROCEDURE — 99204 OFFICE O/P NEW MOD 45 MIN: CPT | Mod: S$GLB,TXP,, | Performed by: INTERNAL MEDICINE

## 2019-11-12 PROCEDURE — 99204 PR OFFICE/OUTPT VISIT, NEW, LEVL IV, 45-59 MIN: ICD-10-PCS | Mod: S$GLB,TXP,, | Performed by: INTERNAL MEDICINE

## 2019-11-12 PROCEDURE — 99999 PR PBB SHADOW E&M-EST. PATIENT-LVL III: CPT | Mod: PBBFAC,TXP,, | Performed by: INTERNAL MEDICINE

## 2019-11-12 PROCEDURE — 3078F DIAST BP <80 MM HG: CPT | Mod: CPTII,S$GLB,TXP, | Performed by: INTERNAL MEDICINE

## 2019-11-12 PROCEDURE — 99999 PR PBB SHADOW E&M-EST. PATIENT-LVL III: ICD-10-PCS | Mod: PBBFAC,TXP,, | Performed by: INTERNAL MEDICINE

## 2019-11-12 PROCEDURE — 3077F SYST BP >= 140 MM HG: CPT | Mod: CPTII,S$GLB,TXP, | Performed by: INTERNAL MEDICINE

## 2019-11-12 RX ORDER — IRON POLYSACCHARIDE COMPLEX 150 MG
CAPSULE ORAL
Refills: 5 | COMMUNITY
Start: 2019-10-17 | End: 2020-10-27

## 2019-11-12 NOTE — H&P (VIEW-ONLY)
Subjective:   Patient ID:  Chidi Lisa is a 54 y.o. male who presents for follow-up of Pre-op Exam (kidney transplant)      HPI:   Mr. Lisa is a 54 y.o. year old man here for preop prior to potential kidney transplant.  He has advanced kidney disease secondary to diabetic nephropathy and HTN. He was diagnosed with DM  In 2003 and HTN [id number]. He has never been on insulin according to him. Patient is currently pre-dialysis. He is CKD stage 5 with eGFR 10.9 ml/min he was dx with CKD in12/2018.     Patient states that he is doing well. He is very active but does endorse fatigue sx. He works full time and at [employer] as a  so he wal walks a lot. He is able to keep up with yard work and he de weeds.  He endorses doing groceries in being able to walk all great [employer] stores without the need of electrical cart.    On March 30, 2016 he presented to the ER with slurred speech had a tele neurology consult in was diagnosed with a TIA and has been on NOAC since.    He denies any CP, SOB,  orthopnea, PND or syncope. The appetite is good and denies any nausea, vomiting, diarrhea, abdominal pain, melena. The bowel movements are regular and weight is stable. he has no cough, fever, chills, weight loss or night sweats.  he has no focal weakness, sensory loss, numbness or paresthesias.   Patient denies any recent ER visit, hospitalization or recent history of coronary artery disease, stroke, seizure disorder, chronic obstructive pulmonary disease, liver disease, kidney stones, gallstones, deep venous thrombosis, pulmonary embolism, recurrent urinary tract infections or malignancies.    Functional Status: He does not exercise but has recently started an exercise routine and has lost a significant amount of weight. He had a lifestyle change and is doing well.  He  Endorses climbing a 3 flight of stairs prior to stopping or getting chest pain, SOB, or claudication.       ECHO   · Normal left  "ventricular systolic function. The estimated ejection fraction is 65%  · Mild concentric left ventricular hypertrophy with mild increased density.  · No wall motion abnormalities.  · Grade II (moderate) left ventricular diastolic dysfunction consistent with pseudonormalization.  · Severe left atrial enlargement.  · Normal right ventricular systolic function.  · Moderate right atrial enlargement.  · Mild right ventricular enlargement.  · Mild mitral sclerosis.  · Mild to moderate tricuspid regurgitation.  · Mild aortic regurgitation.  · Intermediate central venous pressure (8 mm Hg).  · The estimated PA systolic pressure is 70 mm Hg  · Severe Pulmonary hypertension present.  · The ascending aorta is mildly dilated.    SPECT     The perfusion scan is free of evidence from myocardial ischemia or injury.    There is a  mild intensity fixed defect in the inferior wall of the left ventricle secondary to diaphragm attenuation.    There is a mild intensity defect in the anteroseptal wall of the left ventricle consistent with the RV insertion site.    Gated perfusion images showed an ejection fraction of 57.0 % at rest. Normal is more than 52%.    Wall Motion is physiologic at rest.    LV cavity size is normal at rest.    The EKG portion of this study is abnormal but not diagnostic.    The patient reported no chest pain during the stress test.    The blood pressure response to exercise was normal.          Vitals:    11/12/19 0949   BP: (!) 157/70   BP Location: Right arm   Patient Position: Sitting   BP Method: X-Large (Automatic)   Pulse: (!) 52   Weight: 98.4 kg (216 lb 14.9 oz)   Height: 5' 7" (1.702 m)     Body mass index is 33.98 kg/m².  CrCl cannot be calculated (Patient's most recent lab result is older than the maximum 7 days allowed.).    Lab Results   Component Value Date     10/01/2019    K 4.4 10/01/2019     10/01/2019    CO2 24 10/01/2019    BUN 64 (H) 10/01/2019    CREATININE 6.2 " (H) 10/01/2019     10/01/2019    HGBA1C 5.4 10/01/2019    AST 21 10/01/2019    ALT 12 10/01/2019    ALBUMIN 3.5 10/01/2019    PROT 7.4 10/01/2019    BILITOT 0.3 10/01/2019    WBC 4.34 10/01/2019    HGB 8.1 (L) 10/01/2019    HCT 27.2 (L) 10/01/2019    MCV 78 (L) 10/01/2019     10/01/2019    INR 1.3 (H) 10/01/2019    PSA 0.65 10/01/2019    CHOL 116 (L) 10/01/2019    HDL 38 (L) 10/01/2019    LDLCALC 57.8 (L) 10/01/2019    TRIG 101 10/01/2019       Current Outpatient Medications   Medication Sig    allopurinol (ZYLOPRIM) 100 MG tablet Take 100 mg by mouth once daily.    amLODIPine (NORVASC) 10 MG tablet Take 10 mg by mouth every evening.     atorvastatin (LIPITOR) 40 MG tablet Take 40 mg by mouth every evening.     doxazosin (CARDURA) 8 MG Tab Take 8 mg by mouth every 12 (twelve) hours.     furosemide (LASIX) 80 MG tablet Take 80 mg by mouth once daily.    metoprolol tartrate (LOPRESSOR) 100 MG tablet Take 100 mg by mouth 2 (two) times daily.    pioglitazone (ACTOS) 30 MG tablet Take 30 mg by mouth once daily.    POLY-IRON 150 mg iron Cap TK ONE C PO QD    rivaroxaban (XARELTO) 15 mg Tab Take 15 mg by mouth daily with dinner or evening meal.     No current facility-administered medications for this visit.        Review of Systems   Constitution: Positive for malaise/fatigue. Negative for decreased appetite, weight gain and weight loss.   Eyes: Negative for visual disturbance.   Cardiovascular: Negative for chest pain, claudication, dyspnea on exertion, irregular heartbeat, orthopnea, palpitations, paroxysmal nocturnal dyspnea and syncope.   Respiratory: Negative for cough, shortness of breath and snoring.    Skin: Negative for rash.   Musculoskeletal: Negative for arthritis, muscle cramps, muscle weakness and myalgias.   Gastrointestinal: Negative for abdominal pain, anorexia, change in bowel habit and nausea.   Genitourinary: Negative for dysuria and frequency.   Neurological: Negative for  excessive daytime sleepiness, dizziness, headaches, loss of balance, numbness and weakness.   Psychiatric/Behavioral: Negative for depression.       Objective:   Physical Exam   Constitutional: He is oriented to person, place, and time. He appears well-developed and well-nourished.   HENT:   Head: Normocephalic and atraumatic.   Eyes: Pupils are equal, round, and reactive to light.   Neck: Normal range of motion. Neck supple. JVD present.   Cardiovascular: Normal rate, regular rhythm, normal heart sounds, intact distal pulses and normal pulses. Exam reveals no gallop.   No murmur heard.  Pulmonary/Chest: Effort normal and breath sounds normal.   Abdominal: Soft. Bowel sounds are normal. There is no hepatosplenomegaly. There is no tenderness.   Musculoskeletal: Normal range of motion.   Neurological: He is alert and oriented to person, place, and time.   Skin: Skin is warm and dry.   Psychiatric: He has a normal mood and affect. His speech is normal and behavior is normal. Judgment and thought content normal.       Assessment:     1. Pulmonary HTN    2. Renal hypertension    3. CKD (chronic kidney disease), stage V    4. Controlled type 2 diabetes mellitus with stage 5 chronic kidney disease not on chronic dialysis, without long-term current use of insulin        Plan:   Pt with pHTN on ECHO which is likely secondary to increased filling pressures.  Pt is pre-dialysis.  With dialysis, filling pressures likely to decrease back to normal ranges.  Pt will need a RHC to confirm prior to surgery.  Will discuss with HTS to setup.      No orders of the defined types were placed in this encounter.

## 2019-11-12 NOTE — PROGRESS NOTES
Subjective:   Patient ID:  Chidi Lisa is a 54 y.o. male who presents for follow-up of Pre-op Exam (kidney transplant)      HPI:   Mr. Lisa is a 54 y.o. year old man here for preop prior to potential kidney transplant.  He has advanced kidney disease secondary to diabetic nephropathy and HTN. He was diagnosed with DM  In 2003 and HTN [id number]. He has never been on insulin according to him. Patient is currently pre-dialysis. He is CKD stage 5 with eGFR 10.9 ml/min he was dx with CKD in12/2018.     Patient states that he is doing well. He is very active but does endorse fatigue sx. He works full time and at [employer] as a  so he wal walks a lot. He is able to keep up with yard work and he de weeds.  He endorses doing groceries in being able to walk all great [employer]market stores without the need of electrical cart.    On March 30, 2016 he presented to the ER with slurred speech had a tele neurology consult in was diagnosed with a TIA and has been on NOAC since.    He denies any CP, SOB,  orthopnea, PND or syncope. The appetite is good and denies any nausea, vomiting, diarrhea, abdominal pain, melena. The bowel movements are regular and weight is stable. he has no cough, fever, chills, weight loss or night sweats.  he has no focal weakness, sensory loss, numbness or paresthesias.   Patient denies any recent ER visit, hospitalization or recent history of coronary artery disease, stroke, seizure disorder, chronic obstructive pulmonary disease, liver disease, kidney stones, gallstones, deep venous thrombosis, pulmonary embolism, recurrent urinary tract infections or malignancies.    Functional Status: He does not exercise but has recently started an exercise routine and has lost a significant amount of weight. He had a lifestyle change and is doing well.  He  Endorses climbing a 3 flight of stairs prior to stopping or getting chest pain, SOB, or claudication.       ECHO   · Normal left  "ventricular systolic function. The estimated ejection fraction is 65%  · Mild concentric left ventricular hypertrophy with mild increased density.  · No wall motion abnormalities.  · Grade II (moderate) left ventricular diastolic dysfunction consistent with pseudonormalization.  · Severe left atrial enlargement.  · Normal right ventricular systolic function.  · Moderate right atrial enlargement.  · Mild right ventricular enlargement.  · Mild mitral sclerosis.  · Mild to moderate tricuspid regurgitation.  · Mild aortic regurgitation.  · Intermediate central venous pressure (8 mm Hg).  · The estimated PA systolic pressure is 70 mm Hg  · Severe Pulmonary hypertension present.  · The ascending aorta is mildly dilated.    SPECT     The perfusion scan is free of evidence from myocardial ischemia or injury.    There is a  mild intensity fixed defect in the inferior wall of the left ventricle secondary to diaphragm attenuation.    There is a mild intensity defect in the anteroseptal wall of the left ventricle consistent with the RV insertion site.    Gated perfusion images showed an ejection fraction of 57.0 % at rest. Normal is more than 52%.    Wall Motion is physiologic at rest.    LV cavity size is normal at rest.    The EKG portion of this study is abnormal but not diagnostic.    The patient reported no chest pain during the stress test.    The blood pressure response to exercise was normal.          Vitals:    11/12/19 0949   BP: (!) 157/70   BP Location: Right arm   Patient Position: Sitting   BP Method: X-Large (Automatic)   Pulse: (!) 52   Weight: 98.4 kg (216 lb 14.9 oz)   Height: 5' 7" (1.702 m)     Body mass index is 33.98 kg/m².  CrCl cannot be calculated (Patient's most recent lab result is older than the maximum 7 days allowed.).    Lab Results   Component Value Date     10/01/2019    K 4.4 10/01/2019     10/01/2019    CO2 24 10/01/2019    BUN 64 (H) 10/01/2019    CREATININE 6.2 " (H) 10/01/2019     10/01/2019    HGBA1C 5.4 10/01/2019    AST 21 10/01/2019    ALT 12 10/01/2019    ALBUMIN 3.5 10/01/2019    PROT 7.4 10/01/2019    BILITOT 0.3 10/01/2019    WBC 4.34 10/01/2019    HGB 8.1 (L) 10/01/2019    HCT 27.2 (L) 10/01/2019    MCV 78 (L) 10/01/2019     10/01/2019    INR 1.3 (H) 10/01/2019    PSA 0.65 10/01/2019    CHOL 116 (L) 10/01/2019    HDL 38 (L) 10/01/2019    LDLCALC 57.8 (L) 10/01/2019    TRIG 101 10/01/2019       Current Outpatient Medications   Medication Sig    allopurinol (ZYLOPRIM) 100 MG tablet Take 100 mg by mouth once daily.    amLODIPine (NORVASC) 10 MG tablet Take 10 mg by mouth every evening.     atorvastatin (LIPITOR) 40 MG tablet Take 40 mg by mouth every evening.     doxazosin (CARDURA) 8 MG Tab Take 8 mg by mouth every 12 (twelve) hours.     furosemide (LASIX) 80 MG tablet Take 80 mg by mouth once daily.    metoprolol tartrate (LOPRESSOR) 100 MG tablet Take 100 mg by mouth 2 (two) times daily.    pioglitazone (ACTOS) 30 MG tablet Take 30 mg by mouth once daily.    POLY-IRON 150 mg iron Cap TK ONE C PO QD    rivaroxaban (XARELTO) 15 mg Tab Take 15 mg by mouth daily with dinner or evening meal.     No current facility-administered medications for this visit.        Review of Systems   Constitution: Positive for malaise/fatigue. Negative for decreased appetite, weight gain and weight loss.   Eyes: Negative for visual disturbance.   Cardiovascular: Negative for chest pain, claudication, dyspnea on exertion, irregular heartbeat, orthopnea, palpitations, paroxysmal nocturnal dyspnea and syncope.   Respiratory: Negative for cough, shortness of breath and snoring.    Skin: Negative for rash.   Musculoskeletal: Negative for arthritis, muscle cramps, muscle weakness and myalgias.   Gastrointestinal: Negative for abdominal pain, anorexia, change in bowel habit and nausea.   Genitourinary: Negative for dysuria and frequency.   Neurological: Negative for  excessive daytime sleepiness, dizziness, headaches, loss of balance, numbness and weakness.   Psychiatric/Behavioral: Negative for depression.       Objective:   Physical Exam   Constitutional: He is oriented to person, place, and time. He appears well-developed and well-nourished.   HENT:   Head: Normocephalic and atraumatic.   Eyes: Pupils are equal, round, and reactive to light.   Neck: Normal range of motion. Neck supple. JVD present.   Cardiovascular: Normal rate, regular rhythm, normal heart sounds, intact distal pulses and normal pulses. Exam reveals no gallop.   No murmur heard.  Pulmonary/Chest: Effort normal and breath sounds normal.   Abdominal: Soft. Bowel sounds are normal. There is no hepatosplenomegaly. There is no tenderness.   Musculoskeletal: Normal range of motion.   Neurological: He is alert and oriented to person, place, and time.   Skin: Skin is warm and dry.   Psychiatric: He has a normal mood and affect. His speech is normal and behavior is normal. Judgment and thought content normal.       Assessment:     1. Pulmonary HTN    2. Renal hypertension    3. CKD (chronic kidney disease), stage V    4. Controlled type 2 diabetes mellitus with stage 5 chronic kidney disease not on chronic dialysis, without long-term current use of insulin        Plan:   Pt with pHTN on ECHO which is likely secondary to increased filling pressures.  Pt is pre-dialysis.  With dialysis, filling pressures likely to decrease back to normal ranges.  Pt will need a RHC to confirm prior to surgery.  Will discuss with HTS to setup.      No orders of the defined types were placed in this encounter.

## 2019-11-12 NOTE — LETTER
November 12, 2019      Rimma Owens, FRANCESCA  [address]  [facility]  1st Floor Clinic  [facility] [address]           [facility]  [address]  [facility] [address]  Phone: [phone]          Patient: Chidi Lisa   MR Number: [id number]   YOB: 1965   Date of Visit: 11/12/2019       Dear Rimma Owens:    Thank you for referring Chidi Lisa to me for evaluation. Attached you will find relevant portions of my assessment and plan of care.    If you have questions, please do not hesitate to call me. I look forward to following Chidi Lisa along with you.    Sincerely,    Neto Brandt MD    Enclosure  CC:  No Recipients    If you would like to receive this communication electronically, please contact [email or web address] or [phone] to request more information on [employer] [facility] access.    For providers and/or their staff who would like to refer a patient to [facility], please contact us through our one-stop-shop provider referral line, [facility], at [phone].    If you feel you have received this communication in error or would no longer like to receive these types of communications, please e-mail [email or web address]

## 2019-11-14 ENCOUNTER — TELEPHONE (OUTPATIENT)
Dept: TRANSPLANT | Facility: CLINIC | Age: 54
End: 2019-11-14

## 2019-11-14 DIAGNOSIS — I27.20 PULMONARY HTN: Primary | ICD-10-CM

## 2019-11-20 ENCOUNTER — HOSPITAL ENCOUNTER (OUTPATIENT)
Facility: HOSPITAL | Age: 54
Discharge: HOME | End: 2019-11-20
Attending: INTERNAL MEDICINE | Admitting: INTERNAL MEDICINE
Payer: COMMERCIAL

## 2019-11-20 ENCOUNTER — LAB VISIT (OUTPATIENT)
Dept: LAB | Facility: HOSPITAL | Age: 54
End: 2019-11-20
Attending: INTERNAL MEDICINE
Payer: COMMERCIAL

## 2019-11-20 DIAGNOSIS — I42.9 CARDIOMYOPATHY, UNSPECIFIED TYPE: ICD-10-CM

## 2019-11-20 DIAGNOSIS — I27.20 PULMONARY HTN: ICD-10-CM

## 2019-11-20 LAB
ANION GAP SERPL CALC-SCNC: 11 MMOL/L (ref 8–16)
BASOPHILS # BLD AUTO: 0.01 K/UL (ref 0–0.2)
BASOPHILS NFR BLD: 0.2 % (ref 0–1.9)
BUN SERPL-MCNC: 86 MG/DL (ref 6–20)
CALCIUM SERPL-MCNC: 9.2 MG/DL (ref 8.7–10.5)
CHLORIDE SERPL-SCNC: 102 MMOL/L (ref 95–110)
CO2 SERPL-SCNC: 26 MMOL/L (ref 23–29)
CREAT SERPL-MCNC: 6.9 MG/DL (ref 0.5–1.4)
DIFFERENTIAL METHOD: ABNORMAL
EOSINOPHIL # BLD AUTO: 0.1 K/UL (ref 0–0.5)
EOSINOPHIL NFR BLD: 2.3 % (ref 0–8)
ERYTHROCYTE [DISTWIDTH] IN BLOOD BY AUTOMATED COUNT: 15.9 % (ref 11.5–14.5)
EST. GFR  (AFRICAN AMERICAN): 9.5 ML/MIN/1.73 M^2
EST. GFR  (NON AFRICAN AMERICAN): 8.2 ML/MIN/1.73 M^2
GLUCOSE SERPL-MCNC: 105 MG/DL (ref 70–110)
HCT VFR BLD AUTO: 26.8 % (ref 40–54)
HGB BLD-MCNC: 8.2 G/DL (ref 14–18)
IMM GRANULOCYTES # BLD AUTO: 0.01 K/UL (ref 0–0.04)
IMM GRANULOCYTES NFR BLD AUTO: 0.2 % (ref 0–0.5)
INR PPP: 1.3 (ref 0.8–1.2)
LYMPHOCYTES # BLD AUTO: 1 K/UL (ref 1–4.8)
LYMPHOCYTES NFR BLD: 17.4 % (ref 18–48)
MCH RBC QN AUTO: 23.3 PG (ref 27–31)
MCHC RBC AUTO-ENTMCNC: 30.6 G/DL (ref 32–36)
MCV RBC AUTO: 76 FL (ref 82–98)
MONOCYTES # BLD AUTO: 0.5 K/UL (ref 0.3–1)
MONOCYTES NFR BLD: 8.4 % (ref 4–15)
NEUTROPHILS # BLD AUTO: 4 K/UL (ref 1.8–7.7)
NEUTROPHILS NFR BLD: 71.5 % (ref 38–73)
NRBC BLD-RTO: 0 /100 WBC
PLATELET # BLD AUTO: 179 K/UL (ref 150–350)
PMV BLD AUTO: 10.1 FL (ref 9.2–12.9)
POTASSIUM SERPL-SCNC: 4.3 MMOL/L (ref 3.5–5.1)
PROTHROMBIN TIME: 13.1 SEC (ref 9–12.5)
RBC # BLD AUTO: 3.52 M/UL (ref 4.6–6.2)
SODIUM SERPL-SCNC: 139 MMOL/L (ref 136–145)
WBC # BLD AUTO: 5.59 K/UL (ref 3.9–12.7)

## 2019-11-20 PROCEDURE — 25000003 PHARM REV CODE 250: Mod: TXP | Performed by: INTERNAL MEDICINE

## 2019-11-20 PROCEDURE — 93451 PR RIGHT HEART CATH O2 SATURATION & CARDIAC OUTPUT: ICD-10-PCS | Mod: 26,TXP,, | Performed by: INTERNAL MEDICINE

## 2019-11-20 PROCEDURE — 85610 PROTHROMBIN TIME: CPT | Mod: NTX

## 2019-11-20 PROCEDURE — 93463 DRUG ADMIN & HEMODYNMIC MEAS: CPT | Mod: TXP | Performed by: INTERNAL MEDICINE

## 2019-11-20 PROCEDURE — 36415 COLL VENOUS BLD VENIPUNCTURE: CPT | Mod: NTX

## 2019-11-20 PROCEDURE — 93451 RIGHT HEART CATH: CPT | Mod: TXP | Performed by: INTERNAL MEDICINE

## 2019-11-20 PROCEDURE — 85025 COMPLETE CBC W/AUTO DIFF WBC: CPT | Mod: NTX

## 2019-11-20 PROCEDURE — 93451 RIGHT HEART CATH: CPT | Mod: 26,TXP,, | Performed by: INTERNAL MEDICINE

## 2019-11-20 PROCEDURE — 93463 DRUG ADMIN & HEMODYNMIC MEAS: CPT | Mod: TXP,,, | Performed by: INTERNAL MEDICINE

## 2019-11-20 PROCEDURE — 93463 PR MEDICATION ADMIN & HEMODYNAMIC MEASURMENT: ICD-10-PCS | Mod: TXP,,, | Performed by: INTERNAL MEDICINE

## 2019-11-20 PROCEDURE — C1751 CATH, INF, PER/CENT/MIDLINE: HCPCS | Mod: TXP | Performed by: INTERNAL MEDICINE

## 2019-11-20 PROCEDURE — 80048 BASIC METABOLIC PNL TOTAL CA: CPT | Mod: TXP

## 2019-11-20 PROCEDURE — C1894 INTRO/SHEATH, NON-LASER: HCPCS | Mod: TXP | Performed by: INTERNAL MEDICINE

## 2019-11-20 RX ORDER — NITROGLYCERIN 400 UG/1
SPRAY ORAL
Status: DISCONTINUED | OUTPATIENT
Start: 2019-11-20 | End: 2019-11-20 | Stop reason: HOSPADM

## 2019-11-20 RX ORDER — SODIUM CHLORIDE 0.9 G/100ML
IRRIGANT IRRIGATION
Status: DISCONTINUED | OUTPATIENT
Start: 2019-11-20 | End: 2019-11-20 | Stop reason: HOSPADM

## 2019-11-20 RX ORDER — LIDOCAINE HYDROCHLORIDE AND EPINEPHRINE 20; 10 MG/ML; UG/ML
INJECTION, SOLUTION INFILTRATION; PERINEURAL
Status: DISCONTINUED | OUTPATIENT
Start: 2019-11-20 | End: 2019-11-20 | Stop reason: HOSPADM

## 2019-11-20 NOTE — DISCHARGE INSTRUCTIONS

## 2019-11-20 NOTE — Clinical Note
The PA catheter is repositioned to the main pulmonary artery. Hemodynamics performed. Cardiac output obtained at 8 L/min. O2 saturation measured at 69%.

## 2019-11-20 NOTE — INTERVAL H&P NOTE
No significant changes from H&P dated 11/12/19. I have explained the risks, benefits, and alternatives of the procedure in detail.  The patient voices understanding and all questions have been answered.  The patient agrees to proceed as planned.  Patient presents for RHC to evaluate filling pressures and PA pressures prior to possible renal transplant listing.

## 2019-11-20 NOTE — BRIEF OP NOTE
Patient tolerated the procedure well. Please see complete RHC procedure note for full details and results. Stable to return from cath lab to their home.   Procedure: Right heart catheterization   Procedure date: 11/20/19    Discharge date: 11/20/19  Estimated blood loss: less than 10 cc  Complications: none  Condition at discharge: stable  Discharge instructions: no heavy lifting greater than 5 lbs and no bearing down/coughing without holding pressure over incision site.  Activity: return to normal after 24 hours.

## 2020-01-20 ENCOUNTER — TELEPHONE (OUTPATIENT)
Dept: TRANSPLANT | Facility: CLINIC | Age: 55
End: 2020-01-20

## 2020-02-04 ENCOUNTER — TELEPHONE (OUTPATIENT)
Dept: TRANSPLANT | Facility: CLINIC | Age: 55
End: 2020-02-04

## 2020-02-04 NOTE — TELEPHONE ENCOUNTER
Patient will be presented to committee in a few weeks once work up has been reviewed.   ----- Message from Radha Candelario sent at 2/4/2020  1:25 PM CST -----  Contact: pt  Patient calling to speak with coordinator       Call Back : [phone]

## 2020-02-17 ENCOUNTER — TELEPHONE (OUTPATIENT)
Dept: TRANSPLANT | Facility: CLINIC | Age: 55
End: 2020-02-17

## 2020-02-17 NOTE — TELEPHONE ENCOUNTER
----- Message from Neto Brandt MD sent at 2/14/2020  3:50 PM CST -----  He is ok to proceed from our perspective.  His pHTN is due to elevated left sided filling pressures (not intrinsic heart disease) that would improve with diuresis/dialysis.    ----- Message -----  From: Krys Caballero  Sent: 2/13/2020   2:25 PM CST  To: MD Dr. Karly Swanson,    This patient is being considered for kidney transplant. He is s/p right heart catheterization due to elevated PA pressure on echo. Can you please comment on his transplant candidacy?   Krys

## 2020-02-19 ENCOUNTER — SOCIAL WORK (OUTPATIENT)
Dept: TRANSPLANT | Facility: CLINIC | Age: 55
End: 2020-02-19

## 2020-02-19 NOTE — PROGRESS NOTES
Nephrologist:  Dr. Kamran Adorno, [phone].     Nephrology office reports patient having high nephrology compliance within last 3 months with appointments and instructions. Discharged due to patient started dialysis.     Pt now [place] dialysis[place]  [facility], [phone]. Hemodialysis: Tues, Thurs, Sat  3.5 hours. Dialysis unit reports patient having high compliance within last 3 months with treatments and instructions.     Psychosocial Suitability: Patient presents as a suitable candidate for kidney transplant at this time. Based on psychosocial risk factors, patient presents as low risk, due to patient denying any psychosocial barriers to organ transplant. Pt reports having organ transplant caregiver/transportation plan, medical insurance plan and plan to afford transplant costs all in place.      Recommendations/Additional Comments: Pt reports lives away and will need transplant lodging; lodging reviewed and discussed in detail. Pt reports he and caregivers work and may need employer paperwork completed for any time missed due to transplant. Pt reports is pre dialysis and not on Medicare; Medicare book provided today.     Final determination of transplant candidacy will be made once work up is complete and reviewed by the selection committee.     Lalitha Russ [facility]W [facility]

## 2020-02-21 ENCOUNTER — COMMITTEE REVIEW (OUTPATIENT)
Dept: TRANSPLANT | Facility: CLINIC | Age: 55
End: 2020-02-21

## 2020-02-21 DIAGNOSIS — Z76.82 ORGAN TRANSPLANT CANDIDATE: Primary | ICD-10-CM

## 2020-02-21 NOTE — COMMITTEE REVIEW
Native Organ Dx: Diabetes Mellitus - Type II      Unable to determine transplant candidacy at this time due to need for patient to have neurovascular consult due to vertebral disease and a repeat echocardiogram due to elevated PA pressures (pt was pre-dialysis)    Will need to question patient regarding his balance, symptoms of vertigo, and whether he is having any falls.     Spoken to patient's wife, pt was unavailable. She stated that patient quit smoking about a 1.5 year ago. She will have patient to call me back.     Note written by Krys Caballero RN    ===============================================    I was present at the meeting and attest to the decision of the committee. My additional comments are bolded above.    Jovanna Simon MD

## 2020-02-21 NOTE — LETTER
February 21, 2020    Chidi Sloan  [address] [address]    Dear Chidi Lisa:  MRN: [id number]    Your transplant evaluation was reviewed at the [facility] Selection Committee meeting on 2/21/2020.  It is with regret I inform you that your workup is incomplete and we are unable to determine your transplant candidacy at this time due the need for a neurovascular consult due to vertebral disease (severe blood vessel disease noted on brain MRI) and a repeat echocardiogram due to elevated PA pressures (pressures in the heart and lung).  You will be re-presented to the selection committee once pending studies are completed.  You will be notified of the committees decision once we review the new results.    The [facility] Selection Committee carefully considers each patients transplant candidacy using established selection criteria to determine if it is safe to proceed with transplantation for each and every person evaluated.  Although the selection committee believes your workup is incomplete and we are unable to determine your transplant candidacy, you have the right to be evaluated at other transplant centers.  You may request your [facility] records be sent to any center of your choice by contacting our Medical Records Department at [phone].    Attached is a letter from the [employer] Organ [employer]aring (UNOS).  It describes the services and information offered to patients by UNOS and the Organ Procurement and Transplant Network.    Sincerely,      Tata Rodríguez MD  Medical Director, Kidney & Kidney/Pancreas Transplantation  lh    Encl: UNOS Letter    CC:  Dr. Kamran Adorno          [facility]             The Organ Procurement and Transplantation Network   Toll-free patient services line: Your resource for organ transplant information     If you have a question regarding your own medical care, you always should call your transplant hospital first.  However, for general organ transplant-related information, you can call the Organ Procurement and Transplantation Network (OPTN) toll-free patient services line at [phone].     Anyone, including potential transplant candidates, candidates, recipients, family members, friends, living donors, and donor family members, can call this number to:     · Talk about organ donation, living donation, the transplant process, the donation process, and transplant policies.   · Get a free patient information kit with helpful booklets, waiting list and transplant information, and a list of all transplant hospitals.   · Ask questions about the OPTN website (https://optn.transplant.hrsa.gov/), the [employer] Organ [employer]arings (UNOS) website (https://unos.org/), or the UNOS website for living donors and transplant recipients. (https://www.transplantliving.org/).   · Learn how the OPTN can help you.   · Talk about any concerns that you may have with a transplant hospital.     The nations transplant system, the OPTN, is managed under federal contract by the [employer] Organ Sharing (UNOS), which is a non-profit charitable organization. The OPTN helps create and define organ sharing policies that make the best use of donated organs. This process continuously evaluating new advances and discoveries so policies can be adapted to best serve patients waiting for transplants. To do so, the OPTN works closely with transplant professionals, transplant patients, transplant candidates, donor families, living donors, and the public. All transplant programs and organ procurement organizations throughout the country are OPTN members and are obligated to follow the policies the OPTN creates for allocating organs.     The OPTN also is responsible for:   · Providing educational material for patients, the public, and professionals.   · Raising awareness of the need for donated organs and tissue.   · Coordinating organ procurement,  matching, and placement.   · Collecting information about every organ transplant and donation that occurs in the [place].     Remember, you should contact your transplant hospital directly if you have questions or concerns about your own medical care including medical records, work-up progress, and test results.     We are not your transplant hospital, and our staff will not be able to answer questions about your case, so please keep your transplant hospitals phone number handy.   However, while you research your transplant needs and learn as much as you can about transplantation and donation, we welcome your call to our toll-free patient services line at [phone].

## 2020-02-27 ENCOUNTER — TELEPHONE (OUTPATIENT)
Dept: TRANSPLANT | Facility: CLINIC | Age: 55
End: 2020-02-27

## 2020-02-27 NOTE — TELEPHONE ENCOUNTER
"Committee's decision explained to patient. Patient denied any problems with balance and falls, and symptoms of vertigo. Patient stated "I can go try out of the [place]". All questions were answered and patient verbalized understanding.        ----- Message from Maddy Fu sent at 2/27/2020 12:37 PM CST -----  Contact: Pt  Pt is calling to speak with nurse.    Pt Cell # [phone]    "

## 2020-03-04 ENCOUNTER — TELEPHONE (OUTPATIENT)
Dept: TRANSPLANT | Facility: CLINIC | Age: 55
End: 2020-03-04

## 2020-03-05 ENCOUNTER — TELEPHONE (OUTPATIENT)
Dept: TRANSPLANT | Facility: CLINIC | Age: 55
End: 2020-03-05

## 2020-03-11 ENCOUNTER — OFFICE VISIT (OUTPATIENT)
Dept: NEUROLOGY | Facility: CLINIC | Age: 55
End: 2020-03-11
Payer: COMMERCIAL

## 2020-03-11 VITALS
DIASTOLIC BLOOD PRESSURE: 65 MMHG | SYSTOLIC BLOOD PRESSURE: 168 MMHG | HEIGHT: 67 IN | BODY MASS INDEX: 31.61 KG/M2 | WEIGHT: 201.38 LBS | HEART RATE: 58 BPM

## 2020-03-11 DIAGNOSIS — G45.9 TIA (TRANSIENT ISCHEMIC ATTACK): Primary | ICD-10-CM

## 2020-03-11 DIAGNOSIS — Z76.82 ORGAN TRANSPLANT CANDIDATE: ICD-10-CM

## 2020-03-11 PROCEDURE — 99204 PR OFFICE/OUTPT VISIT, NEW, LEVL IV, 45-59 MIN: ICD-10-PCS | Mod: S$GLB,TXP,, | Performed by: PSYCHIATRY & NEUROLOGY

## 2020-03-11 PROCEDURE — 99204 OFFICE O/P NEW MOD 45 MIN: CPT | Mod: S$GLB,TXP,, | Performed by: PSYCHIATRY & NEUROLOGY

## 2020-03-11 PROCEDURE — 3008F BODY MASS INDEX DOCD: CPT | Mod: CPTII,S$GLB,TXP, | Performed by: PSYCHIATRY & NEUROLOGY

## 2020-03-11 PROCEDURE — 3077F PR MOST RECENT SYSTOLIC BLOOD PRESSURE >= 140 MM HG: ICD-10-PCS | Mod: CPTII,S$GLB,TXP, | Performed by: PSYCHIATRY & NEUROLOGY

## 2020-03-11 PROCEDURE — 3078F PR MOST RECENT DIASTOLIC BLOOD PRESSURE < 80 MM HG: ICD-10-PCS | Mod: CPTII,S$GLB,TXP, | Performed by: PSYCHIATRY & NEUROLOGY

## 2020-03-11 PROCEDURE — 99999 PR PBB SHADOW E&M-EST. PATIENT-LVL IV: ICD-10-PCS | Mod: PBBFAC,TXP,, | Performed by: PSYCHIATRY & NEUROLOGY

## 2020-03-11 PROCEDURE — 3078F DIAST BP <80 MM HG: CPT | Mod: CPTII,S$GLB,TXP, | Performed by: PSYCHIATRY & NEUROLOGY

## 2020-03-11 PROCEDURE — 99999 PR PBB SHADOW E&M-EST. PATIENT-LVL IV: CPT | Mod: PBBFAC,TXP,, | Performed by: PSYCHIATRY & NEUROLOGY

## 2020-03-11 PROCEDURE — 3077F SYST BP >= 140 MM HG: CPT | Mod: CPTII,S$GLB,TXP, | Performed by: PSYCHIATRY & NEUROLOGY

## 2020-03-11 PROCEDURE — 3008F PR BODY MASS INDEX (BMI) DOCUMENTED: ICD-10-PCS | Mod: CPTII,S$GLB,TXP, | Performed by: PSYCHIATRY & NEUROLOGY

## 2020-03-11 NOTE — PROGRESS NOTES
Chidi Lisa is a 54 y.o. year old male that  presents for neurovascular assessment in preparation for kidney transplant candidacy.    HPI:  Mr Lisa has HTN, DM2, ESRD on HD, episodic migraine with visual aura, and history of TIA in 2016.  Stated that in 2016 he was taken to [facility] ER due to acute onset confusion recognizing numbers, L face droop, and slurred speech that lasted approximately one hour and was gone by the time  Tele stroke consult was started. He was diagnosed with a TIA and has been on xarelto ever since.  Further details about TIA work up done at that time are not available for review today but reportedly vascular imaging showed some sort of VA stenosis and TTE done 10/19 revealed EF 65%, no wall motion abnormality, but severe LA enlargement.  He denies recurrence of TIA /stroke like symptoms and presently denies HA, vertigo, double vision, difficulty swallowing, focal weakness or numbness, slurred speech, language or vision impairment.      Past Medical History:   Diagnosis Date    Diabetes mellitus     Hypertension     TIA (transient ischemic attack) 10/4/2019     Social History     Socioeconomic History    Marital status:      Spouse name: Not on file    Number of children: Not on file    Years of education: Not on file    Highest education level: Not on file   Occupational History    Not on file   Social Needs    Financial resource strain: Not on file    Food insecurity:     Worry: Not on file     Inability: Not on file    Transportation needs:     Medical: Not on file     Non-medical: Not on file   Tobacco Use    Smoking status: Former Smoker     Last attempt to quit: 2019     Years since quittin.1    Smokeless tobacco: Never Used   Substance and Sexual Activity    Alcohol use: Not Currently     Comment: 3x week 6 pack    Drug use: No    Sexual activity: Not on file   Lifestyle    Physical activity:     Days per week: Not on file      "Minutes per session: Not on file    Stress: Not on file   Relationships    Social connections:     Talks on phone: Not on file     Gets together: Not on file     Attends Shinto service: Not on file     Active member of club or organization: Not on file     Attends meetings of clubs or organizations: Not on file     Relationship status: Not on file   Other Topics Concern    Not on file   Social History Narrative    Not on file     Past Surgical History:   Procedure Laterality Date    HERNIA REPAIR      RIGHT HEART CATHETERIZATION Right 11/20/2019    Procedure: INSERTION, CATHETER, RIGHT HEART;  Surgeon: Cinthia Coley MD;  [facility] CATH LAB;  Service: Cardiology;  Laterality: Right;     Family History   Problem Relation Age of Onset    Hyperlipidemia Mother     Kidney disease Neg Hx     Heart attack Neg Hx     Heart disease Neg Hx     Heart failure Neg Hx     Stroke Neg Hx            Review of Systems  General ROS: negative for chills, fever or weight loss  Psychological ROS: negative for hallucination, depression or suicidal ideation  Ophthalmic ROS: negative for blurry vision, photophobia or eye pain  ENT ROS: negative for epistaxis, sore throat or rhinorrhea  Respiratory ROS: no cough, shortness of breath, or wheezing  Cardiovascular ROS: no chest pain or dyspnea on exertion  Gastrointestinal ROS: no abdominal pain, change in bowel habits, or black/ bloody stools  Genito-Urinary ROS: no dysuria, trouble voiding, or hematuria  Musculoskeletal ROS: negative for gait disturbance or muscular weakness  Neurological ROS: no syncope or seizures; no ataxia  Dermatological ROS: negative for pruritis, rash and jaundice      Physical Exam:  BP (!) 168/65 (BP Location: Right arm, Patient Position: Sitting)   Pulse (!) 58   Ht 5' 7" (1.702 m)   Wt 91.3 kg (201 lb 6.2 oz)   BMI 31.54 kg/m²   General appearance: alert, cooperative, no distress  Constitutional:Oriented to person, place, and time.appears " well-developed and well-nourished.   HEENT: Normocephalic, atraumatic, neck symmetrical, no nasal discharge   Eyes: conjunctivae/corneas clear, PERRL, EOM's intact  Lungs: clear to auscultation bilaterally, no dullness to percussion bilaterally  Heart: regular rate and rhythm without rub; no displacement of the PMI   Abdomen: soft, non-tender; bowel sounds normoactive; no organomegaly  Extremities: extremities symmetric; no clubbing, cyanosis, or edema  Integument: Skin color, texture, turgor normal; no rashes; hair distrubution normal  Neurologic:   Mental status: alert and awake, oriented x 4, comprehension, naming, and repetition intact. No right to left confusion. Performs serial 7's without difficulty .No dysarthria.  CN 2-12: pupils 4 mm bilaterally, reactive to light. Fundi without papilledema. Visual fields full to confrontation. EOM full without nystagmus. Face sensation normal in all distributions. Face symmetric. Hearing grossly intact. Palate elevates well. Tongue midline without atrophy or fasciculations.  Motor: 5/5 all over  Sensory: intact in all modalities.  DTR's: 2+ all over.  Plantars: no tested.  Coordination: finger to nose and heel-knee-shin intact.  Gait: no ataxia or bradykinesia    LABS:    Complete Blood Count  Lab Results   Component Value Date    RBC 3.84 (L) 01/09/2020    HGB 9.3 (L) 01/09/2020    HCT 29.0 (L) 01/09/2020    MCV 75 (L) 01/09/2020    MCH 24.2 (L) 01/09/2020    MCHC 32.1 01/09/2020    RDW 19.9 (H) 01/09/2020     01/09/2020    MPV 8.2 01/09/2020    GRAN 2.8 01/09/2020    GRAN 64.8 01/09/2020    LYMPH 0.9 (L) 01/09/2020    LYMPH 21.0 01/09/2020    MONO 0.4 01/09/2020    MONO 10.0 01/09/2020    EOS 0.2 01/09/2020    BASO 0.00 01/09/2020    EOSINOPHIL 3.5 01/09/2020    BASOPHIL 0.7 01/09/2020    DIFFMETHOD Automated 01/09/2020       Comprehensive Metabolic Panel  Lab Results   Component Value Date     (H) 01/09/2020     (H) 01/09/2020    CREATININE 7.40  (H) 01/09/2020     01/09/2020    K 4.0 01/09/2020     01/09/2020    PROT 7.4 10/01/2019    ALBUMIN 4.5 01/09/2020    BILITOT 0.3 10/01/2019    AST 21 10/01/2019    ALKPHOS 69 10/01/2019    CO2 27 01/09/2020    ALT 12 10/01/2019    ANIONGAP 11 11/20/2019    EGFRNONAA 8 (A) 01/09/2020    ESTGFRAFRICA 9 (A) 01/09/2020       TSH  No results found for: TSH      Assessment: 55 y/o with HTN, DM2, ESRD on HD, episodic migraine with visual aura, history of TIA in 2016, presents for neurovascular assessment of prior TIA with reported VA stenosis/occlusion on vascular imaging in 2016. He is in the process of being evaluated for kidney transplant candidacy.  He is asymptomatic from a neurological standpoint and has a normal neuro exam.  Of importance, he has been on xarelto since his TIA back in 2016 but after review of the available medical records I couldn't find that he has confirmed a fib, although certainly evidence of severe LA enlargement that has been considered as a marker for a fi\b.  He hasn't had recurrence of stroke like symptoms but it will be prudent to get MRA/MRI brain and 30 day event monitor as part of his work up for candidacy for kidney transplant .          ICD-10-CM ICD-9-CM    1. TIA (transient ischemic attack) G45.9 435.9 MRA Brain      MRI Brain Without Contrast      Cardiac event monitor   2. Organ transplant candidate Z76.82 V49.83 Ambulatory consult to Neurology     The primary encounter diagnosis was TIA (transient ischemic attack). A diagnosis of Organ transplant candidate was also pertinent to this visit.      Plan:  1) TIA: as above.   Continue xarelto for now.  2) HTN  3) DM2  4) Episodic migraine: no attacks for the past couple of years.  5) ESRD on HD, follow by nephrology.    Orders Placed This Encounter   Procedures    MRA Brain    MRI Brain Without Contrast    Cardiac event monitor     ADDENDUM:   I personally reviewed neuro imaging requested neuro imaging studies: MRI brain  showed remote lacunar type L posterior frontal infarct but no other significant abnormality.  MRA brain demonstrated moderate stenosis of proximal PCAs bilaterally, unchanged from prior vascular imaging, but no new stenosis or large vessel occlusion. 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  Although his 30 day event monitor reveal no evidence of atrial fibrillation, he has severe LA enlargement documented on prior TTE, thus he can benefit from loop recorder implantation to further address the possibility of occult atrial fibrillation as a potential etiology for his neurovascular events.  It is my opinion that such findings do not preclude patient from being a candidate to undergo renal transplantation.      .Sebastián Sutton MD   Vascular Neurology  [facility]  [facility]

## 2020-03-16 ENCOUNTER — CLINICAL SUPPORT (OUTPATIENT)
Dept: CARDIOLOGY | Facility: HOSPITAL | Age: 55
End: 2020-03-16
Attending: PSYCHIATRY & NEUROLOGY
Payer: COMMERCIAL

## 2020-03-16 DIAGNOSIS — G45.9 TIA (TRANSIENT ISCHEMIC ATTACK): ICD-10-CM

## 2020-03-16 PROCEDURE — 93272 CARDIAC EVENT MONITOR (CUPID ONLY): ICD-10-PCS | Mod: NTX,,, | Performed by: INTERNAL MEDICINE

## 2020-03-16 PROCEDURE — 93271 ECG/MONITORING AND ANALYSIS: CPT | Mod: TXP

## 2020-03-16 PROCEDURE — 93272 ECG/REVIEW INTERPRET ONLY: CPT | Mod: NTX,,, | Performed by: INTERNAL MEDICINE

## 2020-05-14 ENCOUNTER — TELEPHONE (OUTPATIENT)
Dept: TRANSPLANT | Facility: CLINIC | Age: 55
End: 2020-05-14

## 2020-05-14 NOTE — TELEPHONE ENCOUNTER
----- Message from Ben Huerta MD sent at 5/14/2020 10:00 AM CDT -----  Everything is either normal or mild in all that discussion.  ----- Message -----  From: Krys Caballero  Sent: 5/14/2020   9:11 AM CDT  To: Ben Huerta MD    Please advise

## 2020-05-18 ENCOUNTER — TELEPHONE (OUTPATIENT)
Dept: TRANSPLANT | Facility: CLINIC | Age: 55
End: 2020-05-18

## 2020-05-18 DIAGNOSIS — Z76.82 ORGAN TRANSPLANT CANDIDATE: Primary | ICD-10-CM

## 2020-05-18 NOTE — TELEPHONE ENCOUNTER
----- Message from Rimma Owens NP sent at 5/15/2020 11:15 AM CDT -----  If I am reading this correctly he is suggesting f/u with Dr Brandt to pursue the possible increased myocardial density. Have him f/u with Dr Brandt and we also need input c/o   aortic valve is sclerotic; mild restriction of the left coronary cusp is suggested.  Mild aortic stenosis and  Aortic and mitral annular calcification.  Rimma  ----- Message -----  From: Krys Caballero  Sent: 5/15/2020  10:35 AM CDT  To: Rimma Owens NP    See message from Dr. Huerta. Let me know what to do with him  Krys   ----- Message -----  From: Ben Huerta MD  Sent: 5/14/2020  10:03 AM CDT  To: Krys Caballero    Might want to send to Dr Brandt to see if he wants to pursue the possible increased myocardial density.  ----- Message -----  From: Krys Caballero  Sent: 5/14/2020   9:11 AM CDT  To: Ben Huerta MD    Please advise

## 2020-05-20 ENCOUNTER — HOSPITAL ENCOUNTER (OUTPATIENT)
Dept: RADIOLOGY | Facility: HOSPITAL | Age: 55
Discharge: HOME | End: 2020-05-20
Attending: PSYCHIATRY & NEUROLOGY
Payer: COMMERCIAL

## 2020-05-20 DIAGNOSIS — G45.9 TIA (TRANSIENT ISCHEMIC ATTACK): ICD-10-CM

## 2020-05-20 PROCEDURE — 70551 MRI BRAIN WITHOUT CONTRAST: ICD-10-PCS | Mod: 26,TXP,, | Performed by: RADIOLOGY

## 2020-05-20 PROCEDURE — 70551 MRI BRAIN STEM W/O DYE: CPT | Mod: TC,TXP

## 2020-05-20 PROCEDURE — 70551 MRI BRAIN STEM W/O DYE: CPT | Mod: 26,TXP,, | Performed by: RADIOLOGY

## 2020-05-20 PROCEDURE — 70544 MR ANGIOGRAPHY HEAD W/O DYE: CPT | Mod: 26,59,TXP, | Performed by: RADIOLOGY

## 2020-05-20 PROCEDURE — 70544 MR ANGIOGRAPHY HEAD W/O DYE: CPT | Mod: TC,TXP

## 2020-05-20 PROCEDURE — 70544 MRA BRAIN WITHOUT CONTRAST: ICD-10-PCS | Mod: 26,59,TXP, | Performed by: RADIOLOGY

## 2020-05-26 ENCOUNTER — TELEPHONE (OUTPATIENT)
Dept: TRANSPLANT | Facility: CLINIC | Age: 55
End: 2020-05-26

## 2020-06-22 ENCOUNTER — OFFICE VISIT (OUTPATIENT)
Dept: CARDIOLOGY | Facility: CLINIC | Age: 55
End: 2020-06-22
Payer: COMMERCIAL

## 2020-06-22 VITALS
HEART RATE: 61 BPM | BODY MASS INDEX: 32.18 KG/M2 | WEIGHT: 205 LBS | DIASTOLIC BLOOD PRESSURE: 60 MMHG | HEIGHT: 67 IN | SYSTOLIC BLOOD PRESSURE: 160 MMHG | OXYGEN SATURATION: 100 %

## 2020-06-22 DIAGNOSIS — I12.9 RENAL HYPERTENSION: Chronic | ICD-10-CM

## 2020-06-22 DIAGNOSIS — N18.6 ESRD (END STAGE RENAL DISEASE) ON DIALYSIS: Primary | ICD-10-CM

## 2020-06-22 DIAGNOSIS — Z99.2 ESRD (END STAGE RENAL DISEASE) ON DIALYSIS: Primary | ICD-10-CM

## 2020-06-22 DIAGNOSIS — E11.29 CONTROLLED TYPE 2 DIABETES MELLITUS WITH OTHER DIABETIC KIDNEY COMPLICATION, WITHOUT LONG-TERM CURRENT USE OF INSULIN: Chronic | ICD-10-CM

## 2020-06-22 DIAGNOSIS — Z76.82 ORGAN TRANSPLANT CANDIDATE: ICD-10-CM

## 2020-06-22 PROBLEM — I27.20 PULMONARY HTN: Status: RESOLVED | Noted: 2019-11-12 | Resolved: 2020-06-22

## 2020-06-22 PROCEDURE — 99999 PR PBB SHADOW E&M-EST. PATIENT-LVL V: ICD-10-PCS | Mod: PBBFAC,TXP,, | Performed by: INTERNAL MEDICINE

## 2020-06-22 PROCEDURE — 99215 PR OFFICE/OUTPT VISIT, EST, LEVL V, 40-54 MIN: ICD-10-PCS | Mod: S$GLB,TXP,, | Performed by: INTERNAL MEDICINE

## 2020-06-22 PROCEDURE — 3008F BODY MASS INDEX DOCD: CPT | Mod: CPTII,S$GLB,TXP, | Performed by: INTERNAL MEDICINE

## 2020-06-22 PROCEDURE — 3008F PR BODY MASS INDEX (BMI) DOCUMENTED: ICD-10-PCS | Mod: CPTII,S$GLB,TXP, | Performed by: INTERNAL MEDICINE

## 2020-06-22 PROCEDURE — 99999 PR PBB SHADOW E&M-EST. PATIENT-LVL V: CPT | Mod: PBBFAC,TXP,, | Performed by: INTERNAL MEDICINE

## 2020-06-22 PROCEDURE — 3044F PR MOST RECENT HEMOGLOBIN A1C LEVEL <7.0%: ICD-10-PCS | Mod: CPTII,S$GLB,TXP, | Performed by: INTERNAL MEDICINE

## 2020-06-22 PROCEDURE — 99215 OFFICE O/P EST HI 40 MIN: CPT | Mod: S$GLB,TXP,, | Performed by: INTERNAL MEDICINE

## 2020-06-22 PROCEDURE — 3044F HG A1C LEVEL LT 7.0%: CPT | Mod: CPTII,S$GLB,TXP, | Performed by: INTERNAL MEDICINE

## 2020-06-22 RX ORDER — DOXAZOSIN 4 MG/1
4 TABLET ORAL 2 TIMES DAILY
COMMUNITY
End: 2021-10-26

## 2020-06-22 RX ORDER — METOPROLOL SUCCINATE 50 MG/1
50 TABLET, EXTENDED RELEASE ORAL 2 TIMES DAILY
COMMUNITY
End: 2020-10-27

## 2020-06-22 NOTE — PROGRESS NOTES
Subjective:   Patient ID:  Chidi Lisa is a 54 y.o. male who presents for follow-up of Pre-op Exam      HPI:   Mr. Lisa is a 54 y.o. year old man here for preop prior to potential kidney transplant.  He has advanced kidney disease secondary to diabetic nephropathy and HTN. He was diagnosed with DM and HTN in 2003. He has never been on insulin.  I saw patient in the falls 2019 after an echo demonstrated elevated PAP.  At that time he was pre-dialysis.  RHC (see below) suggested elevated filling pressures as the most likely cause of pHTN.  He is now on dialysis and repeat echo has demonstrated normal PAP.  Both echos with increased myocardial density.    Dialysis via L AVF    Home /65 HR 50-60  After dialysis 125-135/60-70      Patient states that he is feels great.  He is very active. He works full time and at [employer] as a  so he wal walks a lot. He is able to keep up with yard work and he de weeds.  He denies any CP, SOB,  orthopnea, PND or syncope. The appetite is good and denies any nausea, vomiting, diarrhea, abdominal pain, melena. The bowel movements are regular and weight is down. He has no cough, fever, chills, weight loss or night sweats.  he has no focal weakness, sensory loss, numbness or paresthesias.     In March 30, 2016 he presented to the ER with slurred speech had a tele neurology consult in was diagnosed with a TIA and has been on NOAC since.       ECHO 5-2020 on dialysis  1. The left ventricle (LV) is normal in size with normal systolic function. There are no significant regional wall motion abnormalities.  The visually estimated LVEF is 60%.  2. Concentric LV hypertrophy is present with an apparent increase in myocardial density.  3. Diastolic parameters are consistent with Grade I LV diastolic dysfunction.  4. The right ventricle (RV) is enlarged with normal systolic function.   5. Estimated RVSP is normal (less than 40 mmHg).  IVC dynamics are normal.   Pulmonary hypertension is not suggested by this study.  6. When indexed for the patient's body surface area the left atrium is mildly enlarged and the right atrium is also enlarged.  7. The aortic valve is sclerotic; mild restriction of the left coronary cusp is suggested.  Mild aortic stenosis is present via Doppler analysis (peak velocity 2.7 m/sec, mean gradient 14 mmHg, AVELINA 1.9 cm2).  Mild aortic insufficiency is present, as is mild tricuspid regurgitation and trivial mitral regurgitation.  Aortic and mitral annular calcification is present.  8. When indexed for the patient's body surface the sinuses of Valsalva and proximal ascending aorta are mildly enlarged.  Effacement of the STJ is not present.  9. Technical quality is adequate.   10. The images from the study dated 10/15/2019 are not available for review/comparison.    RHC  Right Heart Pressures    RA: 17/ 16/ 11 RV: 56/ 3/ 10 PA: 59/ 25/ 35 PWP: 29/ 49/ 25 .   Cardiac output was 8. Cardiac index is 3.87 L/min/m2.   O2 Sat: PA 69%.       Normal CO/CI off inotropes in setting of SBP 150s.  Moderately elevated right and significantly elevated left sided filling pressure.  Moderate pulmonary hypertension.  TPG  10  PVR 1.25    With SL nitroglycerin x .8mg and 1.6mg, with second nitroglycerin dosing, systemic blood pressure reduced to 140s, PCWP did reduce 15, and PA pressures reduced to 49/15, with mean of 28       ECHO   · Normal left ventricular systolic function. The estimated ejection fraction is 65%  · Mild concentric left ventricular hypertrophy with mild increased density.  · No wall motion abnormalities.  · Grade II (moderate) left ventricular diastolic dysfunction consistent with pseudonormalization.  · Severe left atrial enlargement.  · Normal right ventricular systolic function.  · Moderate right atrial enlargement.  · Mild right ventricular enlargement.  · Mild mitral sclerosis.  · Mild to moderate tricuspid regurgitation.  · Mild aortic  "regurgitation.  · Intermediate central venous pressure (8 mm Hg).  · The estimated PA systolic pressure is 70 mm Hg  · Severe Pulmonary hypertension present.  · The ascending aorta is mildly dilated.    SPECT     The perfusion scan is free of evidence from myocardial ischemia or injury.    There is a  mild intensity fixed defect in the inferior wall of the left ventricle secondary to diaphragm attenuation.    There is a mild intensity defect in the anteroseptal wall of the left ventricle consistent with the RV insertion site.    Gated perfusion images showed an ejection fraction of 57.0 % at rest. Normal is more than 52%.    Wall Motion is physiologic at rest.    LV cavity size is normal at rest.    The EKG portion of this study is abnormal but not diagnostic.    The patient reported no chest pain during the stress test.    The blood pressure response to exercise was normal.      Vitals:    06/22/20 0734   BP: (!) 160/60   Pulse: 61   SpO2: 100%   Weight: 93 kg (205 lb 0.4 oz)   Height: 5' 7" (1.702 m)     Body mass index is 32.11 kg/m².  CrCl cannot be calculated (Patient's most recent lab result is older than the maximum 7 days allowed.).    Lab Results   Component Value Date     01/09/2020    K 4.0 01/09/2020     01/09/2020    CO2 27 01/09/2020     (H) 01/09/2020    CREATININE 7.40 (H) 01/09/2020     (H) 01/09/2020    HGBA1C 5.4 10/01/2019    AST 21 10/01/2019    ALT 12 10/01/2019    ALBUMIN 4.5 01/09/2020    PROT 7.4 10/01/2019    BILITOT 0.3 10/01/2019    WBC 4.30 01/09/2020    HGB 9.3 (L) 01/09/2020    HCT 29.0 (L) 01/09/2020    MCV 75 (L) 01/09/2020     01/09/2020    INR 1.3 (H) 11/20/2019    PSA 0.65 10/01/2019    CHOL 116 (L) 10/01/2019    HDL 38 (L) 10/01/2019    LDLCALC 57.8 (L) 10/01/2019    TRIG 101 10/01/2019       Current Outpatient Medications   Medication Sig    allopurinol (ZYLOPRIM) 100 MG tablet Take 100 mg by mouth once daily.    amLODIPine " (NORVASC) 10 MG tablet Take 10 mg by mouth every evening.     atorvastatin (LIPITOR) 40 MG tablet Take 40 mg by mouth every evening.     calcium carbonate (CALCIUM ANTACID) 300 mg (750 mg) Chew Take by mouth.    doxazosin (CARDURA) 4 MG tablet Take 4 mg by mouth every evening.    doxazosin (CARDURA) 8 MG Tab Take 8 mg by mouth every 12 (twelve) hours.     folic acid/vit B complex and C (NEPHRO-ALLY ORAL) Take 8 mg by mouth once daily.    furosemide (LASIX) 80 MG tablet Take 80 mg by mouth once daily.    metoprolol succinate (TOPROL-XL) 50 MG 24 hr tablet Take 50 mg by mouth 2 (two) times daily.    pioglitazone (ACTOS) 30 MG tablet Take 30 mg by mouth once daily.    POLY-IRON 150 mg iron Cap TK ONE C PO QD    rivaroxaban (XARELTO) 15 mg Tab Take 15 mg by mouth daily with dinner or evening meal.    metoprolol tartrate (LOPRESSOR) 100 MG tablet Take 100 mg by mouth 2 (two) times daily.     No current facility-administered medications for this visit.        Review of Systems   Constitution: Negative for decreased appetite, malaise/fatigue, weight gain and weight loss.   Eyes: Negative for visual disturbance.   Cardiovascular: Negative for chest pain, claudication, dyspnea on exertion, irregular heartbeat, orthopnea, palpitations, paroxysmal nocturnal dyspnea and syncope.   Respiratory: Negative for cough, shortness of breath and snoring.    Skin: Negative for rash.   Musculoskeletal: Negative for arthritis, muscle cramps, muscle weakness and myalgias.   Gastrointestinal: Negative for abdominal pain, anorexia, change in bowel habit and nausea.   Genitourinary: Negative for dysuria and frequency.   Neurological: Negative for excessive daytime sleepiness, dizziness, headaches, loss of balance, numbness and weakness.   Psychiatric/Behavioral: Negative for depression.       Objective:   Physical Exam   Constitutional: He is oriented to person, place, and time. He appears well-developed and well-nourished.   HENT:  "  Head: Normocephalic and atraumatic.   Pulmonary/Chest: Effort normal. No accessory muscle usage. No respiratory distress.   Abdominal: Normal appearance.   Neurological: He is alert and oriented to person, place, and time.   Skin: Skin is dry.   Psychiatric: He has a normal mood and affect. His speech is normal and behavior is normal. Judgment and thought content normal. Cognition and memory are normal.       Assessment:     1. ESRD (end stage renal disease) on dialysis    2. Organ transplant candidate    3. Renal hypertension    4. Controlled type 2 diabetes mellitus with other diabetic kidney complication, without long-term current use of insulin        Plan:   Reviewed most recent echo.  "Increased density" is extremely common in patients with CKD.  His strain is normal as is the annual motion.  These findings make amyloid unlikely.  PAP have normalized with dialysis and SPECT was normal.  Ok to proceed with renal transplant without further cardiac evaluation.    Pt has no active cardiac condition (ACS/USA, decompenstated CHF, significant arrhythmias or severe valvular disease) and can easily achieve 4 METS.  Pt does not require further cardiac evaluation prior to undergoing renal transplant surgery.  Pt should remain on beta-blockers throughout the entire alyssa-procedure time period.  Rivaroxiban therapy can be held 2-3 days prior to transplant (if possible)but should be restarted as soon as safely possible.  The remaining cardiac meds can beheld as needed but should also be restarted after the procedure. These recommendations follow the most current Guideline on Perioperative Cardiovascular Evaluation and Management of Patients Undergoing Noncardiac Surgery released by the ACC/AHA. (JACC 2014.07.944).          No orders of the defined types were placed in this encounter.          "

## 2020-06-22 NOTE — LETTER
June 22, 2020      Rimma Owens, FRANCESCA  [address]  [facility]  1st Floor Clinic  [facility] [address]           [facility]  [address]  [facility] [address]  Phone: [phone]          Patient: Chidi Lisa   MR Number: [id number]   YOB: 1965   Date of Visit: 6/22/2020       Dear Rimma Owens:    Thank you for referring Chidi Lisa to me for evaluation. Attached you will find relevant portions of my assessment and plan of care.    If you have questions, please do not hesitate to call me. I look forward to following Chidi Lisa along with you.    Sincerely,    Neto Brandt MD    Enclosure  CC:  No Recipients    If you would like to receive this communication electronically, please contact [email or web address] or [phone] to request more information on [employer] access.    For providers and/or their staff who would like to refer a patient to [facility], please contact us through our one-stop-shop provider referral line, [facility], at [phone].    If you feel you have received this communication in error or would no longer like to receive these types of communications, please e-mail [email or web address]

## 2020-06-30 ENCOUNTER — TELEPHONE (OUTPATIENT)
Dept: TRANSPLANT | Facility: CLINIC | Age: 55
End: 2020-06-30

## 2020-07-02 ENCOUNTER — TELEPHONE (OUTPATIENT)
Dept: TRANSPLANT | Facility: CLINIC | Age: 55
End: 2020-07-02

## 2020-07-02 NOTE — TELEPHONE ENCOUNTER
----- Message from Sebastián Sutton MD sent at 7/2/2020  8:20 AM CDT -----  Good morning,  I reviewed all the images and the 30 day holter monitor and wrote an addendum to my clinic note stating that from our standpoint he has no contraindications for transplant candidacy.  Also informed the patient about tests results.  Thanks,  OC  ----- Message -----  From: Krys Caballero  Sent: 6/30/2020  11:04 AM CDT  To: MD Dr. Herman Gunderson,  This patient is being considered for kidney transplantation. He was seen by you for vertebral disease. Per your recommendations, an MRA and MRI brain and cardiac monitor were ordered. Patient stated that he has completed these studies. If so, can you please comment on his transplant candidacy?    Krys

## 2020-07-10 ENCOUNTER — COMMITTEE REVIEW (OUTPATIENT)
Dept: TRANSPLANT | Facility: CLINIC | Age: 55
End: 2020-07-10

## 2020-07-10 NOTE — LETTER
July 13, 2020    Kamran Adorno MD  [address] [address]       Dear Dr. Adorno:    Patient: Chidi Lisa   MR Number: [id number]   YOB: 1965     Your patient, Chidi Lisa, was recently discussed at the [facility]ney Selection Committee meeting on 2/21/2020. I am happy to inform you that Chidi has been approved for transplantation.  He has met selection criteria for a kidney transplant related to ESRD secondary to primary diagnosis of Diabetes Mellitus - Type II. Your patient will be placed on the cadaveric wait list pending final financial approval from insurance company.     We appreciate your confidence in allowing us to participate in your patients care.  If you have any questions or concerns, please do not hesitate to contact me.    Sincerely,      Tata Rodríguez MD  Medical Director, Kidney & Kidney/Pancreas Transplantation      CC: Chidi Lisa (patient)          [facility]

## 2020-07-10 NOTE — COMMITTEE REVIEW
Native Organ Dx: Diabetes Mellitus - Type II      SELECTION COMMITTEE NOTE    Chidi Lisa was presented at selection committee on 2/21/2020.  Patient met selection criteria for kidney transplant related to ESRD due to   Diabetes Mellitus - Type II.  No absolute contraindications to transplant at this time.  Patient will be placed on the cadaveric wait list pending final financial approval from insurance company.  Patient will return to clinic for routine appointment in 1 year(s). Patient does not meet criteria for High KDPI kidney offer. Patient meets HCV+ kidney offer. Patient does not meet criteria for dual/enbloc, due to weight.          Note written by     ===============================================    I was present at the meeting and attest to the decision of the committee

## 2020-08-06 ENCOUNTER — TELEPHONE (OUTPATIENT)
Dept: TRANSPLANT | Facility: CLINIC | Age: 55
End: 2020-08-06

## 2020-08-06 DIAGNOSIS — Z76.82 ORGAN TRANSPLANT CANDIDATE: Primary | ICD-10-CM

## 2020-08-06 NOTE — TELEPHONE ENCOUNTER
"  KIDNEY WAIT LISTING NOTE    Date of Financial clearance to list: 20  [facility]:     Organ: Kidney  Name:       Chidi Lisa   : 1965          Gender:     male    MRN#: [id number]                                 State of Permanent Residence:  [address] [place] [address]  Ethnicity: /Black   Race:      Black or     CLINICAL INFORMATION   Candidate Medical Urgency Status: Active (1)  Number of Previous Kidney Transplants: 0  Number of Previous Solid Organ Transplants: 0  Did you enter number of previous kidney or other solid organ transplants? yes  Is this Candidate a Prior Living Donor: no  (If yes, please generate letter to [employer] with patient's date of donation, recipient SSN, signed by Surgical Director after patient is listed in order to receive priority points).      ABO  ABO Blood Group:   B POS     ABO Confirmation: (THESE DATES MUST BE PRIOR TO THE LIST DATE AND SUPPORTED BY SEPARATE LAB REPORTS)    Internal Results    Lab Results   Component Value Date    GROUPTR B POS 10/15/2019    GROUPTR B POS 10/01/2019     No results found for: ABO    External Results    ABO Date 1:    ABO Date 2  Are either of these ABO results based on External Labs? no  (If Yes, STOP and go to source document in Media Tab for verification).    VITALS  Height:  5' 5"   Weight:  218 lbs  (Use height from Transplant clinic visits only).  Did you enter height/weight? Yes    HLA    Class I:  Lab Results   Component Value Date    LMKH0FO 2 10/01/2019    IELP4SZ XX 10/01/2019    SJVA4MZ 60 10/01/2019    LJFK1DP 58 10/01/2019    DMOQV7RU 6 10/01/2019    PKYHY3PB 4 10/01/2019    EAEZN7KW 10 10/01/2019    GCEZQ3BF 6 10/01/2019       Class II:  Lab Results   Component Value Date    XZMPHR01MA 4 10/01/2019    KHLCZO22EF 11 10/01/2019    SGZZHX939CH 52 10/01/2019    DBCGFG8146 53 10/01/2019    CMMAP4TV 7 10/01/2019    HWTPT8SL 5 10/01/2019       Tested for HLA Antibodies: Yes, no " "antibodies detected     If result is "Positive" antibodies are detected     If result is "Negative or questionable" no antibodies detected    Lab Results   Component Value Date    CIPRAS Negative 10/01/2019    CIIPRAS Negative 10/01/2019       DIALYSIS INFORMATION  Is patient Pre-Dialysis: No     GFR Information  Report GFR being used as the criteria for placement on the kidney list. If not, leave blank  GFR < or = 20 ml/min? n/a  If Yes, Specify value  ___   ml/min     Initial date GFR became 20 or less:   Is GFR obtained from an Outside lab Result? n/a  (If YES verify with source document scanned into media)    If patient on Dialysis:    Is candidate currently on dialysis for ESRD? Yes  If Yes,  Date Chronic Dialysis Started:   1/17/2020  (verify with source document in Media Tab)   Dialysis Unit Name: [address] [address]                        Physician Name:  Dr. Tata Morataya  NPI#: [id number]    DIABETES INFORMATION  Primary Native Kidney Diagnosis: Diabetes Mellitus - Type II  C-Peptide Value - No results found for: CPEPTIDE  Current Diabetes Status: Type II    FOR NON-KIDNEY DEPARTMENT USE ONLY:  Additional Organs Registered? none    Maximum Acceptable Number of HLA Mismatches  ABDR:     6      (0-6)               AB:               (0-4)  ADR:   _____  (0-4)              BDR: _____ (0-4)  A:        _____  (0-2)              B:      _____ (0-2)          DR: ______ (0-2)    Will Recipient Accept?   Accept HBcAB Positive Organ:            Yes  Accept HBV PRISCILLA Positive Organ:        no  Accept HCV Antibody Positive Organ: yes   Accept HCV PRISCILLA Positive Organ: yes    Dual Kidney and En Bloc Opt In : No  Dual  Local:   No  Dual Import:   No  En Bloc Local:   No  En Bloc Import: No     Accept KDPI > 85: Single: No     Local: No     Import: No  Accept KDPI > 85: Dual: No     Local: No     Import: No      ### NURSE TO VERIFY CONSENT AND MAKE ANY NECESSARY CHANGES NEEDED IN " UNET AT THE TIME OF VERIFICATION ###    Unacceptible Antigens  If yes, list     Lab Results   Component Value Date    QY0RJCE B37 05/06/2020    ABCMT DP1, -- WEAK DP5 05/06/2020       ### DO NOT LIST IF ANTIGEN VALUE WEAK ###

## 2020-08-06 NOTE — LETTER
2020     Chidi Lisa  [address] [place] [address]    Dear Chidi Lisa:  MRN: [id number]    Congratulations! On 2020, you were placed on  the waiting list for a  donor transplant.    Your candidacy for kidney transplant is based on the following criteria: ESRD due to Diabetes Mellitus - Type II.    Your transplant coordinator while on the waiting list is Pamela Day RN. They can be reached at [phone] or [phone] with any questions.      What to do now?    ? Ask your living donors to call and begin testing   Give your donors our phone number, [phone]   Make sure your donors have your name and date of birth when they call   You will get transplanted much faster if you have a living donor    ? Have your blood sent to our Transplant Lab every month   If you are on dialysis - our Transplant Lab will work with your dialysis unit to send your blood every month   If you are not on dialysis   - If you live near an [facility] lab, we will schedule you to have blood drawn every month  - If you do not live near an [facility] lab, you will be sent blood kits in the mail. You will need to take a kit to your local lab or doctor to have your blood drawn every month and mail to the Transplant Lab.     ? Call us with ANY CHANGES   Phone numbers - we must be able to reach you anytime of the day or night when a kidney is available   Address   Insurance coverage   Dialysis unit or kidney doctor   Julius: if you have surgery, stay in the hospital, have to get blood, or have an infection    ? Review your Kidney/Kidney-Pancreas Transplant Guide    This will give you detailed information about what happens when  - you are on the waiting list   - you are called when a kidney is available     The [facility] has a transplant surgeon and physician available 365  days a year, 24 hours a day to coordinate organ acceptance, procurement, surgical placement  and to  address urgent patient care issues.  You will be notified in writing of any changes to our Transplant  Centers staffing plan that would impact your ability to receive a transplant.     Attached is a letter from the [employer] for Organ Sharing (UN[employer]). It describes the services and  information offered to patients by [employer] and the Organ Procurement and Transplant Network. We look  forward to working with you while on the waiting list.      Congratulations,     Your Transplant Partner   [facility]    [address] [place] [address]   [phone]   lh/enclosure   CC:  Dr. Kamran Adorno                      [facility] ROHINI Martinez                                                                             The Organ Procurement and Transplantation Network   Toll-free patient services line: Your resource for organ transplant information     If you have a question regarding your own medical care, you always should call your transplant hospital first. However, for general organ transplant-related information, you can call the Organ Procurement and Transplantation Network (OPTN) toll-free patient services line at [phone].     Anyone, including potential transplant candidates, candidates, recipients, family members, friends, living donors, and donor family members, can call this number to:     · Talk about organ donation, living donation, the transplant process, the donation process, and transplant policies.   · Get a free patient information kit with helpful booklets, waiting list and transplant information, and a list of all transplant hospitals.   · Ask questions about the OPTN website (https://optn.transplant.hrsa.gov/), the [employer] for Organ Sharings (UNOS) website (https://unos.org/), or the [employer] website for living donors and transplant recipients. (https://www.transplantliving.org/).   · Learn how the OPTN can help you.   · Talk about any concerns that you  may have with a transplant hospital.     The nations transplant system, the OPTN, is managed under federal contract by the [employer] Organ Sharing (UN[employer]), which is a non-profit charitable organization. The OPTN helps create and define organ sharing policies that make the best use of donated organs. This process continuously evaluating new advances and discoveries so policies can be adapted to best serve patients waiting for transplants. To do so, the OPTN works closely with transplant professionals, transplant patients, transplant candidates, donor families, living donors, and the public. All transplant programs and organ procurement organizations throughout the country are OPTN members and are obligated to follow the policies the OPTN creates for allocating organs.     The OPTN also is responsible for:   · Providing educational material for patients, the public, and professionals.   · Raising awareness of the need for donated organs and tissue.   · Coordinating organ procurement, matching, and placement.   · Collecting information about every organ transplant and donation that occurs in the [place].     Remember, you should contact your transplant hospital directly if you have questions or concerns about your own medical care including medical records, work-up progress, and test results.     We are not your transplant hospital, and our staff will not be able to answer questions about your case, so please keep your transplant hospitals phone number handy.   However, while you research your transplant needs and learn as much as you can about transplantation and donation, we welcome your call to our toll-free patient services line at [phone].

## 2020-08-10 ENCOUNTER — TELEPHONE (OUTPATIENT)
Dept: TRANSPLANT | Facility: CLINIC | Age: 55
End: 2020-08-10

## 2020-08-10 DIAGNOSIS — Z76.82 PRE-KIDNEY TRANSPLANT, LISTED: Primary | ICD-10-CM

## 2020-08-10 NOTE — PROGRESS NOTES
YEARLY LIST MANAGEMENT NOTE    Chidi Lisa's kidney transplant listing status reviewed.  Patient is due for follow-up appointments on 10/1/20.  Appointments will be scheduled per protocol.

## 2020-08-20 DIAGNOSIS — Z76.82 ORGAN TRANSPLANT CANDIDATE: Primary | ICD-10-CM

## 2020-08-21 ENCOUNTER — TELEPHONE (OUTPATIENT)
Dept: TRANSPLANT | Facility: CLINIC | Age: 55
End: 2020-08-21

## 2020-09-28 PROCEDURE — 86832 HLA CLASS I HIGH DEFIN QUAL: CPT | Mod: PO,TXP

## 2020-09-28 PROCEDURE — 86833 HLA CLASS II HIGH DEFIN QUAL: CPT | Mod: PO,TXP

## 2020-10-02 ENCOUNTER — LAB VISIT (OUTPATIENT)
Dept: LAB | Facility: HOSPITAL | Age: 55
End: 2020-10-02
Payer: COMMERCIAL

## 2020-10-02 DIAGNOSIS — Z76.82 PRE-KIDNEY TRANSPLANT, LISTED: ICD-10-CM

## 2020-10-13 LAB — HPRA INTERPRETATION: NORMAL

## 2020-10-19 LAB
CLASS I ANTIBODIES - LUMINEX: NORMAL
CLASS II ANTIBODY COMMENTS - LUMINEX: NORMAL
CPRA %: 2
SERUM COLLECTION DT - LUMINEX CLASS I: NORMAL
SERUM COLLECTION DT - LUMINEX CLASS II: NORMAL
SPCL1 TESTING DATE: NORMAL
SPCL2 TESTING DATE: NORMAL
SPCLU TESTING DATE: NORMAL

## 2020-10-23 ENCOUNTER — TELEPHONE (OUTPATIENT)
Dept: TRANSPLANT | Facility: CLINIC | Age: 55
End: 2020-10-23

## 2020-10-23 DIAGNOSIS — Z76.82 ORGAN TRANSPLANT CANDIDATE: Primary | ICD-10-CM

## 2020-10-23 NOTE — TELEPHONE ENCOUNTER
----- Message from Pamela Day sent at 10/22/2020  5:02 PM CDT -----  Regarding: FW: Micky    ----- Message -----  From: Jared Uriarte  Sent: 10/22/2020   4:49 PM CDT  To: Kidney Waitlisted Coordinator  Subject: Micky                                          Pt calling to speak to coord in regards to [employer]os program                [phone] (M)

## 2020-10-23 NOTE — TELEPHONE ENCOUNTER
Pt called to verbally consent to A2 titer. He will bring written consent with him to his clinic appt on 10/27/20.

## 2020-10-27 ENCOUNTER — HOSPITAL ENCOUNTER (OUTPATIENT)
Dept: RADIOLOGY | Facility: HOSPITAL | Age: 55
Discharge: HOME | End: 2020-10-27
Attending: NURSE PRACTITIONER
Payer: MEDICARE

## 2020-10-27 ENCOUNTER — OFFICE VISIT (OUTPATIENT)
Dept: TRANSPLANT | Facility: CLINIC | Age: 55
End: 2020-10-27
Payer: MEDICARE

## 2020-10-27 VITALS
RESPIRATION RATE: 18 BRPM | TEMPERATURE: 97 F | OXYGEN SATURATION: 98 % | DIASTOLIC BLOOD PRESSURE: 70 MMHG | HEIGHT: 66 IN | WEIGHT: 197.56 LBS | BODY MASS INDEX: 31.75 KG/M2 | SYSTOLIC BLOOD PRESSURE: 161 MMHG | HEART RATE: 70 BPM

## 2020-10-27 DIAGNOSIS — N18.6 ESRD (END STAGE RENAL DISEASE) ON DIALYSIS: Primary | ICD-10-CM

## 2020-10-27 DIAGNOSIS — Z76.82 PATIENT ON WAITING LIST FOR KIDNEY TRANSPLANT: Chronic | ICD-10-CM

## 2020-10-27 DIAGNOSIS — I12.9 RENAL HYPERTENSION: Chronic | ICD-10-CM

## 2020-10-27 DIAGNOSIS — Z76.82 ORGAN TRANSPLANT CANDIDATE: ICD-10-CM

## 2020-10-27 DIAGNOSIS — Z99.2 ESRD (END STAGE RENAL DISEASE) ON DIALYSIS: Primary | ICD-10-CM

## 2020-10-27 DIAGNOSIS — Z79.01 LONG TERM (CURRENT) USE OF ANTICOAGULANTS: Chronic | ICD-10-CM

## 2020-10-27 DIAGNOSIS — E11.21 CONTROLLED TYPE 2 DIABETES MELLITUS WITH DIABETIC NEPHROPATHY, WITHOUT LONG-TERM CURRENT USE OF INSULIN: Chronic | ICD-10-CM

## 2020-10-27 PROCEDURE — 99215 OFFICE O/P EST HI 40 MIN: CPT | Mod: PBBFAC,25,TXP | Performed by: NURSE PRACTITIONER

## 2020-10-27 PROCEDURE — 99999 PR PBB SHADOW E&M-EST. PATIENT-LVL V: CPT | Mod: PBBFAC,TXP,, | Performed by: NURSE PRACTITIONER

## 2020-10-27 PROCEDURE — 71046 XR CHEST PA AND LATERAL: ICD-10-PCS | Mod: 26,TXP,, | Performed by: RADIOLOGY

## 2020-10-27 PROCEDURE — 71046 X-RAY EXAM CHEST 2 VIEWS: CPT | Mod: 26,TXP,, | Performed by: RADIOLOGY

## 2020-10-27 PROCEDURE — 99999 PR PBB SHADOW E&M-EST. PATIENT-LVL V: ICD-10-PCS | Mod: PBBFAC,TXP,, | Performed by: NURSE PRACTITIONER

## 2020-10-27 PROCEDURE — 99215 OFFICE O/P EST HI 40 MIN: CPT | Mod: S$PBB,TXP,, | Performed by: NURSE PRACTITIONER

## 2020-10-27 PROCEDURE — 71046 X-RAY EXAM CHEST 2 VIEWS: CPT | Mod: TC,TXP

## 2020-10-27 PROCEDURE — 99215 PR OFFICE/OUTPT VISIT, EST, LEVL V, 40-54 MIN: ICD-10-PCS | Mod: S$PBB,TXP,, | Performed by: NURSE PRACTITIONER

## 2020-10-27 RX ORDER — BRIMONIDINE TARTRATE, TIMOLOL MALEATE 2; 5 MG/ML; MG/ML
1 SOLUTION/ DROPS OPHTHALMIC 2 TIMES DAILY PRN
COMMUNITY
End: 2023-02-10

## 2020-10-27 NOTE — PROGRESS NOTES
Kidney Transplant Recipient Reevalulation    Referring Physician: Kamran Adorno  Current Nephrologist: Kamran Adorno  Waitlist Status: active  Dialysis Start Date: 1/17/2020    Subjective:     CC:  Annual reassessment of kidney transplant candidacy.    HPI:  Mr. Lisa is a 54 y.o. year old Black or  male with ESRD secondary to diabetic nephropathy.  He has been on the wait list for a kidney transplant at [facility] since 1/17/2020. Patient is currently on hemodialysis started on 1/17/20. Patient is dialyzing on MWF schedule.  Patient reports that he is tolerating dialysis well.. He has a LUE AV fistula. Patient denies any recent hospitalizations or ED visits.    Over all doing well, looks good not frail    Lab /diagnostic results reviewed with patient today.    10/27/20 CXR  Impression:  No acute process seen.    PSA, Screen (ng/mL)   Date Value   10/27/2020 0.76       Past Medical History:   Diagnosis Date    Diabetes mellitus     Hypertension     TIA (transient ischemic attack) 10/4/2019       Review of Systems   Constitutional: Negative for activity change, appetite change, chills, fatigue, fever and unexpected weight change.   HENT: Negative for congestion, facial swelling, postnasal drip, rhinorrhea, sinus pressure, sore throat and trouble swallowing.    Eyes: Positive for visual disturbance. Negative for pain and redness.        Wears glasses   Respiratory: Negative for cough, chest tightness, shortness of breath and wheezing.    Cardiovascular: Negative.  Negative for chest pain, palpitations and leg swelling.   Gastrointestinal: Negative for abdominal pain, diarrhea, nausea and vomiting.   Genitourinary: Negative for dysuria, flank pain and urgency.   Musculoskeletal: Negative for gait problem, neck pain and neck stiffness.   Skin: Negative for rash.   Allergic/Immunologic: Negative for environmental allergies, food allergies and immunocompromised state.   Neurological: Negative for  "dizziness, weakness, light-headedness and headaches.        Hx stoke ~ 3 years ago, on life long anticoagulation    Hematological: Bruises/bleeds easily.        Eliquis   Psychiatric/Behavioral: Negative for agitation and confusion. The patient is not nervous/anxious.        Objective:   body mass index is 31.82 kg/m².  BP (!) 161/70 (BP Location: Right arm, Patient Position: Sitting, BP Method: Medium (Automatic))   Pulse 70   Temp 97.1 °F (36.2 °C) (Oral)   Resp 18   Ht 5' 6.06" (1.678 m)   Wt 89.6 kg (197 lb 8.5 oz)   SpO2 98%   BMI 31.82 kg/m²     Physical Exam  Vitals signs reviewed.   Constitutional:       Appearance: Normal appearance. He is well-developed.   HENT:      Head: Normocephalic.   Eyes:      Pupils: Pupils are equal, round, and reactive to light.   Neck:      Musculoskeletal: Normal range of motion and neck supple.   Cardiovascular:      Rate and Rhythm: Normal rate and regular rhythm.      Heart sounds: Normal heart sounds.   Pulmonary:      Effort: Pulmonary effort is normal.      Breath sounds: Normal breath sounds.   Abdominal:      General: Bowel sounds are normal.      Palpations: Abdomen is soft.   Musculoskeletal: Normal range of motion.   Skin:     General: Skin is warm and dry.   Neurological:      Mental Status: He is alert and oriented to person, place, and time.      Motor: No abnormal muscle tone.   Psychiatric:         Behavior: Behavior normal.         Labs:  Lab Results   Component Value Date    WBC 4.30 01/09/2020    HGB 9.3 (L) 01/09/2020    HCT 29.0 (L) 01/09/2020     01/09/2020    K 4.0 01/09/2020     01/09/2020    CO2 27 01/09/2020     (H) 01/09/2020    CREATININE 7.40 (H) 01/09/2020    EGFRNONAA 8 (A) 01/09/2020    CALCIUM 9.3 01/09/2020    PHOS 6.00 (H) 01/09/2020    ALBUMIN 4.5 01/09/2020    AST 21 10/01/2019    ALT 12 10/01/2019    UTPCR 3.90 (H) 10/01/2019    .0 (H) 10/27/2020       Lab Results   Component Value Date    BILIRUBINUA " Negative 10/01/2019    PROTEINUA 2+ (A) 10/01/2019    NITRITE Negative 10/01/2019    RBCUA 5 (H) 10/01/2019    WBCUA 4 10/01/2019       No results found for: HLAABCTYPE    Lab Results   Component Value Date    CPRA 2 09/28/2020    YM6ARZX B37 09/28/2020    ABCMT DP1, DP5 09/28/2020       Labs were reviewed with the patient.    Pre-transplant Workup:   Reviewed with the patient.    Assessment:     1. ESRD (end stage renal disease) on dialysis    2. Patient on waiting list for kidney transplant    3. Renal hypertension    4. Controlled type 2 diabetes mellitus with diabetic nephropathy, without long-term current use of insulin    5. Long term (current) use of anticoagulants--Eliquis        Plan:     Transplant Candidacy:   Mr. Lisa is a suitable kidney transplant candidate.  Meets center eligibility for accepting HCV+ donor offer - yes.  Patient educated on HCV+ donors. Chidi is willing  to accept HCV+ donor offer -  yes   Patient is a candidate for KDPI > 85 kidney donor offer - no weight and anticoagulation/ Eliquis.  He remains in overall stable health, and will remain active on the transplant list.    Rimma Owens NP       Follow-up:   In addition to the tests noted in the plan, Mr. Lisa will continue to have reevaluation as per the standing pre-kidney transplant protocol:  1. Monthly blood for PRA  2. Annual return to clinic, except HIV positive, > 65 years of age, or pancreas transplant candidates who will be scheduled to see transplant every 6 months while in pre-transplant phase  3. Annual re-testing: CXR, EKG, yearly mammograms for women over 40 and PSA for males over 40, cardiology follow-up as recommended by initial cardiology pre-transplant evaluation  4. Renal ultrasound every 2 years  5. Baseline colonoscopy after age 50 and repeated as recommended    UNOS Patient Status  Functional Status: 60% - Requires occasional assistance but is able to care for needs  Physical Capacity: No  Limitations

## 2020-10-27 NOTE — PROGRESS NOTES
INITIAL PATIENT EDUCATION NOTE    Mr. Chidi Lisa was seen in pre-kidney transplant clinic for evaluation for kidney, kidney/pancreas or pancreas only transplant.  The patient attended a an individual video education session that discussed/reviewed the following aspects of transplantation: evaluation and selection committee process, UNOS waitlist management/multiple listings, types of organs offered (KDPI < 85%, KDPI > 85%, PHS increased risk, DCD, HCV+, HIV+ for HIV+ recipients and enbloc/dual), financial aspects, surgical procedures, dietary instruction pre- and post-transplant, health maintenance pre- and post-transplant, post-transplant hospitalization and outpatient follow-up, potential to participate in a research protocol, and medication management and side effects.  A question and answer session was provided after the presentation.    The patient was seen by all members of the multi-disciplinary team to include: Nephrologist/PA, Surgeon, , Transplant Coordinator, , Pharmacist and Dietician (if applicable).    The patient reviewed and signed all consents for evaluation which were witnessed and sent to scanning into the [facility] chart.    The patient was given an education book and plan for further evaluation based on his individual assessment.      The patient was encouraged to call with any questions or concerns.

## 2020-10-27 NOTE — LETTER
October 28, 2020        Kamran Adorno  [address]  [address]  [place] [address]  Phone: [phone]  Fax: [phone]             [facility]- Transplant 1st Fl  [address]  [facility] [address]  Phone: [phone]   Patient: Chidi Lisa   MR Number: [id number]   YOB: 1965   Date of Visit: 10/27/2020       Dear Dr. Kamran Adorno    Thank you for referring Chidi Lisa to me for evaluation. Attached you will find relevant portions of my assessment and plan of care.    If you have questions, please do not hesitate to call me. I look forward to following Chidi Lisa along with you.    Sincerely,    Rimma Owens, NP    Enclosure    If you would like to receive this communication electronically, please contact [email or web address] or [phone] to request LikeList Link access.    LikeList Link is a tool which provides read-only access to select patient information with whom you have a relationship. Its easy to use and provides real time access to review your patients record including encounter summaries, notes, results, and demographic information.    If you feel you have received this communication in error or would no longer like to receive these types of communications, please e-mail [email or web address]

## 2020-10-28 NOTE — PROGRESS NOTES
Transplant Recipient Adult Psychosocial Assessment UPDATE (Last update completed on 10/1/2019)    SW completed assessment update with patient and pt's spouse.    Chidi Lisa  [address] [address] [place] [address] from [facility]  Telephone Information:   Mobile [phone]   Home  [phone] (home)  Work  There is no work phone number on file.  E-mail  [email or web address]    Sex: male  YOB: 1965  Age: 54 y.o.     Encounter Date: 10/27/2020  U[place] Citizen: yes  Primary Language: English   Needed: no   Transportation: pt reports does drive/own car. Pt reports drives self for all medical appointments.    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 Reports does have FMLA. [phone]    Family/Social Support:   Number of dependents/: Pt denies  Marital history:  x 2.  to Ashley Lisa for 17 years.  Other family dynamics: Pt reports parents Lindsey and Chidi living and well in [place] [facility] [facility]. Pt reports is working full time and lives with supportive full time employed wife Ashley in [place]. Pt reports having biological and step children and reports step daughters Delicia and Tatiana live nearby in [place] and will be able to assist with transplant as needed. Pt reports supportive biological son Juno Bowman and step son Gio Shields live away and may be able to assist with transplant if needed. Pt's wife Ashley in pre transplant appointments and reports will be pt's primary transplant caregiver with pt's biological and step children as back up transplant caregivers.    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 Reports does have FMLA. [phone]    Do you and your caregivers have access to reliable transportation? yes  PRIMARY CAREGIVER: Ashley Lisa, wife, will be primary  caregiver, phone number  [phone].     provided in-depth information to patient and caregiver regarding pre- and post-transplant caregiver role.   strongly encourages patient and caregiver to have concrete plan regarding post-transplant care giving, including back-up caregiver(s) to ensure care giving needs are met as needed.    Patient and Caregiver states understanding all aspects of caregiver role/commitment and is able/willing/committed to being caregiver to the fullest extent necessary.    Patient and Caregiver verbalizes understanding of the education provided today and caregiver responsibilities.         remains available. Patient and Caregiver agree to contact  in a timely manner if concerns arise.      Able to take time off work without financial concerns: yes. Wife reports has PTO and will apply FMLA. Pt reports will apply for FMLA and has STD/LTD and PTO through work that can be used at transplant.    Additional Significant Others who will Assist with Transplant:  Delicia Rashawn, 37 yo step daughter, Juan MCKINLEY, does drive/own car. [phone]  Tatiana Morocho, 37 yo step daughter, Juan MCKINLEY, does drive/own car. [phone]  Fidel Millan, 45 yo friend, ARLEN Martinez, does drive/own car. [phone]    Living Will: no  Healthcare Power of : no  Advance Directives on file: <<no information> per medical record.  Verbally reviewed LW/HCPA information.   provided patient with copy of LW/HCPA documents and provided education on completion of forms.    Living Donors: No. Education and resource information given to patient.    Highest Education Level: Attended College/Technical School  Reading Ability: college  Reports difficulty with: N/A  Learns Best By:  Reading about, seeing and doing new task until proficient     Status: no  VA Benefits: no     Working for Income: yes  If yes, working activity level: Working Full Time  Patient  reports is working full time in [employer] for [employer] for 7 years. Pt reports net monthly income $3600. Pt reports having STD/LTD, PTO and can apply for FMLA for transplant.    Spouse/Significant Other Employment: Wife Ashley reports work full time as supervisor at [employer]. Wife reports earns $2000 net and reports having PTO and will be able to apply for FMLA for transplant.    Disabled: no    Monthly Income:  Pt reports earns $3600 net and Wife Ashley reports earns $2000 net   Able to afford all costs now and if transplanted, including medications: yes  Patient and Caregiver verbalizes understanding of personal responsibilities related to transplant costs and the importance of having a financial plan to ensure that patients transplant costs are fully covered.       provided fundraising information/education. Patient and Caregiververbalizes understanding.   remains available.    Insurance:   Payor/Plan Subscr  Sex Relation Sub. Ins. ID Effective Group Num   1. BLUE CROSS BL* YARELY EARL* 1965 Male  [id number]                                   [address] [address]   2. MEDICARE - ME* YARELY EARL* 1965 Male Self [id number]                                    [address]     Primary Insurance (for UNOS reporting): Private Insurance BCBS thru pt's employer   Secondary Insurance (for UNOS reporting): Public Insurance - Medicare FFS (Fee For Service)  Patient and Caregiver verbalizes clear understanding that patient may experience difficulty obtaining and/or be denied insurance coverage post-surgery. This includes and is not limited to disability insurance, life insurance, health insurance, burial insurance, long term care insurance, and other insurances.      Patient and Caregiver also reports understanding that future health concerns related to or unrelated to transplantation may not be covered by patient's insurance.  Resources and  "information provided and reviewed.     Patient and Caregiver provides verbal permission to release any necessary information to outside resources for patient care and discharge planning.  Resources and information provided are reviewed.      Dialysis Adherence: Pt reports starting hemodialysis in center on 2020.  Patient reports it has been going "wonderfully" and reports maintaining full compliance with all HD treatment recommendations and schedule.  SW faxed adherence form to patient's dialysis unit and will await returned form to complete HD compliance check.    Infusion Service: patient utilizing? no  Home Health: patient utilizing? no  DME: yes bp cuff, glucometer  Pulmonary/Cardiac Rehab: pt denies   ADLS:  Pt reports being independent with all ADLs and mobility and driving himself.    Adherence:   Pt reports high compliance with all medical appointments and instructions.  Adherence education and counseling provided.     Per History Section:  Past Medical History:   Diagnosis Date    Diabetes mellitus     Hypertension     TIA (transient ischemic attack) 10/4/2019     Social History     Tobacco Use    Smoking status: Former Smoker     Quit date: 2019     Years since quittin.7    Smokeless tobacco: Never Used   Substance Use Topics    Alcohol use: Not Currently     Comment: 3x week 6 pack     Social History     Substance and Sexual Activity   Drug Use No     Social History     Substance and Sexual Activity   Sexual Activity Not on file     Per Today's Psychosocial:  Tobacco: none, patient denies any use. Patient reports quitting cigarette smoking 2 years ago.  Alcohol: Patient reports currently drinking 1 or 2 beers on Sundays while watching sports.  Illicit Drugs/Non-prescribed Medications: none, patient denies any use.    Patient and Caregiver states clear understanding of the potential impact of substance use as it relates to transplant candidacy and is aware of possible random " substance screening.  Substance abstinence/cessation counseling, education and resources provided and reviewed.     Arrests/DWI/Treatment/Rehab: patient denies    Psychiatric History:    Mental Health: pt denies any mental health history or current mental health concerns  Psychiatrist/Counselor: pt denies  Medications:  Pt denies  Suicide/Homicide Issues: pt denies any si/hi history  Safety at home: pt reports is living in safe home environment with no abuse.    Knowledge: Patient and Caregiver states having clear understanding and realistic expectations regarding the potential risks and potential benefits of organ transplantation and organ donation and agrees to discuss with health care team members and support system members, as well as to utilize available resources and express questions and/or concerns in order to further facilitate the pt informed decision-making.  Resources and information provided and reviewed.    Patient and Caregiver is aware of [facility]'s affiliation and/or partnership with agencies in home health care, LTAC, SNF, [facility], and other hospitals and clinics.    Understanding: Patient and Caregiver reports having a clear understanding of the many lifetime commitments involved with being a transplant recipient, including costs, compliance, medications, lab work, procedures, appointments, concrete and financial planning, preparedness, timely and appropriate communication of concerns, abstinence (ETOH, tobacco, illicit non-prescribed drugs), adherence to all health care team recommendations, support system and caregiver involvement, appropriate and timely resource utilization and follow-through, mental health counseling as needed/recommended, and patient and caregiver responsibilities.  Social Service Handbook, resources and detailed educational information provided and reviewed.  Educational information provided.    Patient and Caregiver also reports current and expected compliance with health care  "regime and states having a clear understanding of the importance of compliance.      Patient and Caregiver reports a clear understanding that risks and benefits may be involved with organ transplantation and with organ donation.       Patient and Caregiver also reports clear understanding that psychosocial risk factors may affect patient, and include but are not limited to feelings of depression, generalized anxiety, anxiety regarding dependence on others, post traumatic stress disorder, feelings of guilt and other emotional and/or mental concerns, and/or exacerbation of existing mental health concerns.  Detailed resources provided and discussed.      Patient and Caregiver agrees to access appropriate resources in a timely manner as needed and/or as recommended, and to communicate concerns appropriately.  Patient and Caregiver also reports a clear understanding of treatment options available.     Patient and Caregiver received education in a group setting.   reviewed education, provided additional information, and answered questions.    Feelings or Concerns: Pt reports high motivation to continue pursuing organ transplant and denies having any transplant related concerns at this time.    Coping: Pt reports is coping well over all with kidney disease and need for organ transplant. Pt reports aleta best through watching sports on Sundays, staying engaged with full time work, and spending time with family.  Pt reports staying "positive".    Goals: Pt reports hope for successful organ transplant and achieving a greater quality of life post-transplant.  Patient referred to Vocational Rehabilitation.    Interview Behavior: Patient and Caregiver presents as alert and oriented x 4, pleasant, good eye contact, well groomed, recall good, concentration/judgement good, average intelligence, calm, communicative, cooperative and asking and answering questions appropriately. Pt's supportive wife Ashley in session with " pt's permission.         Transplant Social Work - Candidacy  Assessment/Plan:     Psychosocial Suitability: Patient presents as a suitable candidate for kidney transplant at this time. Based on psychosocial risk factors, patient presents as low risk, due to patient denying any psychosocial barriers to organ transplant. Pt reports having organ transplant caregiver/transportation plan, medical insurance plan and plan to afford transplant costs all in place. Pt reports high medical compliance with appointments and instructions within last 3 months.  Dialysis adherence check is pending.    Recommendations/Additional Comments:  Pt reports lives away and will need transplant lodging; lodging reviewed and discussed in detail.  Patient confirms with BCBS having lodging benefits and will likely stay at [facility] during local post-transplant recovery period.  Pt reports he and caregivers work and may need employer paperwork completed for any time missed due to transplant.  SW recommends that pt conduct fundraising to assist pt with pay for cost of medications, food, gas, and other transplant related needs.  SW recommends that pt remain aware of potential mental health concerns and contact the team if any concerns arise.  SW recommends that pt remain abstinent from tobacco, ETOH, and drug use.  SW supports pt's continued adherence. SW remains available to answer any questions or concerns that arise as the pt moves through the transplant process.     Andrew SORENSON SW

## 2020-10-29 PROCEDURE — 99001 SPECIMEN HANDLING PT-LAB: CPT | Mod: PO,TXP

## 2020-11-02 ENCOUNTER — LAB VISIT (OUTPATIENT)
Dept: LAB | Facility: HOSPITAL | Age: 55
End: 2020-11-02
Payer: COMMERCIAL

## 2020-11-02 DIAGNOSIS — Z76.82 PRE-KIDNEY TRANSPLANT, LISTED: ICD-10-CM

## 2020-11-03 DIAGNOSIS — Z76.82 PRE-KIDNEY TRANSPLANT, LISTED: Primary | ICD-10-CM

## 2020-11-03 NOTE — PROGRESS NOTES
YEARLY LIST MANAGEMENT NOTE    Chidi Lisa's kidney transplant listing status reviewed.  Patient is due for follow-up appointments on 10/27/21.  Appointments will be scheduled per protocol.

## 2020-11-06 LAB
HLA FREEZE AND HOLD INTERPRETATION: NORMAL
HLAFH COLLECTION DATE: NORMAL
HPRA INTERPRETATION: NORMAL

## 2020-11-12 PROCEDURE — 86832 HLA CLASS I HIGH DEFIN QUAL: CPT | Mod: PO,TXP

## 2020-11-12 PROCEDURE — 86833 HLA CLASS II HIGH DEFIN QUAL: CPT | Mod: PO,TXP

## 2020-11-13 ENCOUNTER — LAB VISIT (OUTPATIENT)
Dept: LAB | Facility: HOSPITAL | Age: 55
End: 2020-11-13
Payer: COMMERCIAL

## 2020-11-13 DIAGNOSIS — Z76.82 PRE-KIDNEY TRANSPLANT, LISTED: ICD-10-CM

## 2020-11-18 LAB — HPRA INTERPRETATION: NORMAL

## 2020-12-10 PROCEDURE — 99001 SPECIMEN HANDLING PT-LAB: CPT | Mod: PO,TXP

## 2020-12-18 ENCOUNTER — LAB VISIT (OUTPATIENT)
Dept: LAB | Facility: HOSPITAL | Age: 55
End: 2020-12-18
Payer: COMMERCIAL

## 2020-12-18 DIAGNOSIS — Z76.82 PRE-KIDNEY TRANSPLANT, LISTED: ICD-10-CM

## 2021-01-08 ENCOUNTER — PATIENT MESSAGE (OUTPATIENT)
Dept: TRANSPLANT | Facility: CLINIC | Age: 56
End: 2021-01-08

## 2021-01-12 PROCEDURE — 86832 HLA CLASS I HIGH DEFIN QUAL: CPT | Mod: PO,TXP

## 2021-01-12 PROCEDURE — 86833 HLA CLASS II HIGH DEFIN QUAL: CPT | Mod: PO,TXP

## 2021-01-21 ENCOUNTER — LAB VISIT (OUTPATIENT)
Dept: LAB | Facility: HOSPITAL | Age: 56
End: 2021-01-21
Payer: COMMERCIAL

## 2021-01-21 DIAGNOSIS — Z76.82 PRE-KIDNEY TRANSPLANT, LISTED: ICD-10-CM

## 2021-02-03 LAB — HPRA INTERPRETATION: NORMAL

## 2021-02-23 PROCEDURE — 99001 SPECIMEN HANDLING PT-LAB: CPT | Mod: PO,TXP | Performed by: NURSE PRACTITIONER

## 2021-03-01 ENCOUNTER — LAB VISIT (OUTPATIENT)
Dept: LAB | Facility: HOSPITAL | Age: 56
End: 2021-03-01
Payer: COMMERCIAL

## 2021-03-01 DIAGNOSIS — Z76.82 PRE-KIDNEY TRANSPLANT, LISTED: ICD-10-CM

## 2021-03-16 PROCEDURE — 86832 HLA CLASS I HIGH DEFIN QUAL: CPT | Mod: PO,TXP | Performed by: NURSE PRACTITIONER

## 2021-03-16 PROCEDURE — 86833 HLA CLASS II HIGH DEFIN QUAL: CPT | Mod: PO,TXP | Performed by: NURSE PRACTITIONER

## 2021-03-23 ENCOUNTER — LAB VISIT (OUTPATIENT)
Dept: LAB | Facility: HOSPITAL | Age: 56
End: 2021-03-23
Payer: COMMERCIAL

## 2021-03-23 DIAGNOSIS — Z76.82 PRE-KIDNEY TRANSPLANT, LISTED: ICD-10-CM

## 2021-04-05 PROCEDURE — 99001 SPECIMEN HANDLING PT-LAB: CPT | Mod: PO,TXP | Performed by: NURSE PRACTITIONER

## 2021-04-08 ENCOUNTER — LAB VISIT (OUTPATIENT)
Dept: LAB | Facility: HOSPITAL | Age: 56
End: 2021-04-08
Payer: COMMERCIAL

## 2021-04-08 DIAGNOSIS — Z76.82 PRE-KIDNEY TRANSPLANT, LISTED: ICD-10-CM

## 2021-04-08 LAB — HPRA INTERPRETATION: NORMAL

## 2021-05-06 ENCOUNTER — PATIENT MESSAGE (OUTPATIENT)
Dept: RESEARCH | Facility: HOSPITAL | Age: 56
End: 2021-05-06

## 2021-05-10 ENCOUNTER — PATIENT MESSAGE (OUTPATIENT)
Dept: RESEARCH | Facility: HOSPITAL | Age: 56
End: 2021-05-10

## 2021-05-13 PROCEDURE — 86833 HLA CLASS II HIGH DEFIN QUAL: CPT | Mod: PO,TXP | Performed by: NURSE PRACTITIONER

## 2021-05-13 PROCEDURE — 86832 HLA CLASS I HIGH DEFIN QUAL: CPT | Mod: PO,TXP | Performed by: NURSE PRACTITIONER

## 2021-05-26 ENCOUNTER — EPISODE CHANGES (OUTPATIENT)
Dept: TRANSPLANT | Facility: CLINIC | Age: 56
End: 2021-05-26

## 2021-05-26 ENCOUNTER — LAB VISIT (OUTPATIENT)
Dept: LAB | Facility: HOSPITAL | Age: 56
End: 2021-05-26
Payer: COMMERCIAL

## 2021-05-26 DIAGNOSIS — Z76.82 PRE-KIDNEY TRANSPLANT, LISTED: ICD-10-CM

## 2021-06-14 LAB — HPRA INTERPRETATION: NORMAL

## 2021-07-05 PROCEDURE — 86833 HLA CLASS II HIGH DEFIN QUAL: CPT | Mod: PO,TXP | Performed by: NURSE PRACTITIONER

## 2021-07-05 PROCEDURE — 86832 HLA CLASS I HIGH DEFIN QUAL: CPT | Mod: PO,TXP | Performed by: NURSE PRACTITIONER

## 2021-07-13 ENCOUNTER — LAB VISIT (OUTPATIENT)
Dept: LAB | Facility: HOSPITAL | Age: 56
End: 2021-07-13
Payer: COMMERCIAL

## 2021-07-13 DIAGNOSIS — Z76.82 PRE-KIDNEY TRANSPLANT, LISTED: ICD-10-CM

## 2021-08-09 LAB — HPRA INTERPRETATION: NORMAL

## 2021-08-16 DIAGNOSIS — Z76.82 ORGAN TRANSPLANT CANDIDATE: Primary | ICD-10-CM

## 2021-08-23 ENCOUNTER — LAB VISIT (OUTPATIENT)
Dept: LAB | Facility: HOSPITAL | Age: 56
End: 2021-08-23
Payer: COMMERCIAL

## 2021-08-23 DIAGNOSIS — Z76.82 PRE-KIDNEY TRANSPLANT, LISTED: ICD-10-CM

## 2021-08-24 ENCOUNTER — TELEPHONE (OUTPATIENT)
Dept: TRANSPLANT | Facility: CLINIC | Age: 56
End: 2021-08-24

## 2021-10-04 PROCEDURE — 99001 SPECIMEN HANDLING PT-LAB: CPT | Mod: PO,TXP | Performed by: NURSE PRACTITIONER

## 2021-10-11 ENCOUNTER — EPISODE CHANGES (OUTPATIENT)
Dept: TRANSPLANT | Facility: CLINIC | Age: 56
End: 2021-10-11

## 2021-10-11 PROCEDURE — 86833 HLA CLASS II HIGH DEFIN QUAL: CPT | Mod: PO,TXP | Performed by: NURSE PRACTITIONER

## 2021-10-11 PROCEDURE — 86832 HLA CLASS I HIGH DEFIN QUAL: CPT | Mod: PO,TXP | Performed by: NURSE PRACTITIONER

## 2021-10-18 ENCOUNTER — LAB VISIT (OUTPATIENT)
Dept: LAB | Facility: HOSPITAL | Age: 56
End: 2021-10-18
Payer: COMMERCIAL

## 2021-10-18 DIAGNOSIS — Z76.82 PRE-KIDNEY TRANSPLANT, LISTED: ICD-10-CM

## 2021-10-22 ENCOUNTER — TELEPHONE (OUTPATIENT)
Dept: CARDIOLOGY | Facility: CLINIC | Age: 56
End: 2021-10-22

## 2021-10-26 ENCOUNTER — HOSPITAL ENCOUNTER (OUTPATIENT)
Dept: CARDIOLOGY | Facility: HOSPITAL | Age: 56
Discharge: HOME | End: 2021-10-26
Attending: NURSE PRACTITIONER
Payer: MEDICARE

## 2021-10-26 ENCOUNTER — OFFICE VISIT (OUTPATIENT)
Dept: TRANSPLANT | Facility: CLINIC | Age: 56
End: 2021-10-26
Attending: NURSE PRACTITIONER
Payer: COMMERCIAL

## 2021-10-26 VITALS
SYSTOLIC BLOOD PRESSURE: 140 MMHG | HEIGHT: 66 IN | BODY MASS INDEX: 31.66 KG/M2 | WEIGHT: 197 LBS | HEART RATE: 68 BPM | DIASTOLIC BLOOD PRESSURE: 80 MMHG

## 2021-10-26 VITALS — BODY MASS INDEX: 31.66 KG/M2 | HEIGHT: 66 IN | WEIGHT: 197 LBS

## 2021-10-26 VITALS
HEIGHT: 65 IN | BODY MASS INDEX: 33.69 KG/M2 | OXYGEN SATURATION: 99 % | SYSTOLIC BLOOD PRESSURE: 175 MMHG | DIASTOLIC BLOOD PRESSURE: 74 MMHG | RESPIRATION RATE: 16 BRPM | TEMPERATURE: 97 F | WEIGHT: 202.19 LBS | HEART RATE: 71 BPM

## 2021-10-26 DIAGNOSIS — E11.21 CONTROLLED TYPE 2 DIABETES MELLITUS WITH DIABETIC NEPHROPATHY, WITHOUT LONG-TERM CURRENT USE OF INSULIN: Chronic | ICD-10-CM

## 2021-10-26 DIAGNOSIS — Z79.01 LONG TERM (CURRENT) USE OF ANTICOAGULANTS: Chronic | ICD-10-CM

## 2021-10-26 DIAGNOSIS — Z99.2 ESRD ON PERITONEAL DIALYSIS: ICD-10-CM

## 2021-10-26 DIAGNOSIS — I12.9 RENAL HYPERTENSION: Chronic | ICD-10-CM

## 2021-10-26 DIAGNOSIS — Z76.82 ORGAN TRANSPLANT CANDIDATE: ICD-10-CM

## 2021-10-26 DIAGNOSIS — N18.6 ESRD ON PERITONEAL DIALYSIS: ICD-10-CM

## 2021-10-26 DIAGNOSIS — G45.9 TIA (TRANSIENT ISCHEMIC ATTACK): Chronic | ICD-10-CM

## 2021-10-26 DIAGNOSIS — Z76.82 PATIENT ON WAITING LIST FOR KIDNEY TRANSPLANT: Primary | Chronic | ICD-10-CM

## 2021-10-26 LAB
ASCENDING AORTA: 4.46 CM
AV INDEX (PROSTH): 0.45
AV MEAN GRADIENT: 15 MMHG
AV PEAK GRADIENT: 31 MMHG
AV VALVE AREA: 2.19 CM2
AV VELOCITY RATIO: 0.49
BSA FOR ECHO PROCEDURE: 2.04 M2
CV ECHO LV RWT: 0.43 CM
CV PHARM DOSE: 0.4 MG
CV STRESS BASE HR: 67 BPM
DIASTOLIC BLOOD PRESSURE: 77 MMHG
DOP CALC AO PEAK VEL: 2.78 M/S
DOP CALC AO VTI: 66.46 CM
DOP CALC LVOT AREA: 4.9 CM2
DOP CALC LVOT DIAMETER: 2.49 CM
DOP CALC LVOT PEAK VEL: 1.35 M/S
DOP CALC LVOT STROKE VOLUME: 145.67 CM3
DOP CALCLVOT PEAK VEL VTI: 29.93 CM
E WAVE DECELERATION TIME: 166.41 MSEC
E/A RATIO: 1.17
E/E' RATIO: 26.91 M/S
ECHO LV POSTERIOR WALL: 1.2 CM (ref 0.6–1.1)
EJECTION FRACTION- HIGH: 65 %
EJECTION FRACTION: 65 %
END DIASTOLIC INDEX-HIGH: 153 ML/M2
END DIASTOLIC INDEX-LOW: 93 ML/M2
END SYSTOLIC INDEX-HIGH: 71 ML/M2
END SYSTOLIC INDEX-LOW: 31 ML/M2
FRACTIONAL SHORTENING: 34 % (ref 28–44)
INTERVENTRICULAR SEPTUM: 1.2 CM (ref 0.6–1.1)
LA MAJOR: 6.71 CM
LA MINOR: 6.9 CM
LA WIDTH: 4.64 CM
LEFT ATRIUM SIZE: 4.95 CM
LEFT ATRIUM VOLUME INDEX MOD: 52 ML/M2
LEFT ATRIUM VOLUME INDEX: 66.7 ML/M2
LEFT ATRIUM VOLUME MOD: 103.47 CM3
LEFT ATRIUM VOLUME: 132.83 CM3
LEFT INTERNAL DIMENSION IN SYSTOLE: 3.72 CM (ref 2.1–4)
LEFT VENTRICLE DIASTOLIC VOLUME INDEX: 77.52 ML/M2
LEFT VENTRICLE DIASTOLIC VOLUME: 154.26 ML
LEFT VENTRICLE MASS INDEX: 141 G/M2
LEFT VENTRICLE SYSTOLIC VOLUME INDEX: 29.6 ML/M2
LEFT VENTRICLE SYSTOLIC VOLUME: 58.98 ML
LEFT VENTRICULAR INTERNAL DIMENSION IN DIASTOLE: 5.61 CM (ref 3.5–6)
LEFT VENTRICULAR MASS: 281.29 G
LV LATERAL E/E' RATIO: 24.67 M/S
LV SEPTAL E/E' RATIO: 29.6 M/S
MV A" WAVE DURATION": 14.46 MSEC
MV PEAK A VEL: 1.27 M/S
MV PEAK E VEL: 1.48 M/S
MV STENOSIS PRESSURE HALF TIME: 48.26 MS
MV VALVE AREA P 1/2 METHOD: 4.56 CM2
NUC REST DIASTOLIC VOLUME INDEX: 230
NUC REST EJECTION FRACTION: 53
NUC REST SYSTOLIC VOLUME INDEX: 108
NUC STRESS DIASTOLIC VOLUME INDEX: 217
NUC STRESS EJECTION FRACTION: 54 %
NUC STRESS SYSTOLIC VOLUME INDEX: 100
OHS CV CPX 85 PERCENT MAX PREDICTED HEART RATE MALE: 140
OHS CV CPX MAX PREDICTED HEART RATE: 165
OHS CV CPX PATIENT IS FEMALE: 0
OHS CV CPX PATIENT IS MALE: 1
OHS CV CPX PEAK DIASTOLIC BLOOD PRESSURE: 57 MMHG
OHS CV CPX PEAK HEAR RATE: 78 BPM
OHS CV CPX PEAK RATE PRESSURE PRODUCT: NORMAL
OHS CV CPX PEAK SYSTOLIC BLOOD PRESSURE: 193 MMHG
OHS CV CPX PERCENT MAX PREDICTED HEART RATE ACHIEVED: 47
OHS CV CPX RATE PRESSURE PRODUCT PRESENTING: NORMAL
PISA MRMAX VEL: 0.05 M/S
PISA TR MAX VEL: 3.1 M/S
PULM VEIN S/D RATIO: 0.94
PV PEAK D VEL: 0.62 M/S
PV PEAK S VEL: 0.58 M/S
RA MAJOR: 5.42 CM
RA PRESSURE: 3 MMHG
RA WIDTH: 3.8 CM
RETIRED EF AND QEF - SEE NOTES: 53 %
RIGHT ATRIAL AREA: 19 CM2
RIGHT VENTRICULAR END-DIASTOLIC DIMENSION: 4.3 CM
SINUS: 3.69 CM
STJ: 4.15 CM
SYSTOLIC BLOOD PRESSURE: 196 MMHG
TDI LATERAL: 0.06 M/S
TDI SEPTAL: 0.05 M/S
TDI: 0.06 M/S
TR MAX PG: 38 MMHG
TRICUSPID ANNULAR PLANE SYSTOLIC EXCURSION: 2.8 CM
TV REST PULMONARY ARTERY PRESSURE: 41 MMHG

## 2021-10-26 PROCEDURE — 63600175 PHARM REV CODE 636 W HCPCS: Mod: TXP | Performed by: NURSE PRACTITIONER

## 2021-10-26 PROCEDURE — 99214 PR OFFICE/OUTPT VISIT, EST, LEVL IV, 30-39 MIN: ICD-10-PCS | Mod: S$GLB,TXP,, | Performed by: NURSE PRACTITIONER

## 2021-10-26 PROCEDURE — 78452 STRESS TEST WITH MYOCARDIAL PERFUSION (CUPID ONLY): ICD-10-PCS | Mod: 26,TXP,, | Performed by: INTERNAL MEDICINE

## 2021-10-26 PROCEDURE — 93016 CV STRESS TEST SUPVJ ONLY: CPT | Mod: TXP,,, | Performed by: INTERNAL MEDICINE

## 2021-10-26 PROCEDURE — 93018 CV STRESS TEST I&R ONLY: CPT | Mod: TXP,,, | Performed by: INTERNAL MEDICINE

## 2021-10-26 PROCEDURE — 99999 PR PBB SHADOW E&M-EST. PATIENT-LVL V: CPT | Mod: PBBFAC,TXP,, | Performed by: NURSE PRACTITIONER

## 2021-10-26 PROCEDURE — 93016 STRESS TEST WITH MYOCARDIAL PERFUSION (CUPID ONLY): ICD-10-PCS | Mod: TXP,,, | Performed by: INTERNAL MEDICINE

## 2021-10-26 PROCEDURE — A9502 TC99M TETROFOSMIN: HCPCS | Mod: TXP

## 2021-10-26 PROCEDURE — 78452 HT MUSCLE IMAGE SPECT MULT: CPT | Mod: 26,TXP,, | Performed by: INTERNAL MEDICINE

## 2021-10-26 PROCEDURE — 99999 PR PBB SHADOW E&M-EST. PATIENT-LVL V: ICD-10-PCS | Mod: PBBFAC,TXP,, | Performed by: NURSE PRACTITIONER

## 2021-10-26 PROCEDURE — 99214 OFFICE O/P EST MOD 30 MIN: CPT | Mod: S$GLB,TXP,, | Performed by: NURSE PRACTITIONER

## 2021-10-26 PROCEDURE — 78452 HT MUSCLE IMAGE SPECT MULT: CPT | Mod: TXP

## 2021-10-26 PROCEDURE — 93018 STRESS TEST WITH MYOCARDIAL PERFUSION (CUPID ONLY): ICD-10-PCS | Mod: TXP,,, | Performed by: INTERNAL MEDICINE

## 2021-10-26 PROCEDURE — 93306 TTE W/DOPPLER COMPLETE: CPT | Mod: 26,TXP,, | Performed by: INTERNAL MEDICINE

## 2021-10-26 PROCEDURE — 93306 TTE W/DOPPLER COMPLETE: CPT | Mod: TXP

## 2021-10-26 PROCEDURE — 93306 ECHO (CUPID ONLY): ICD-10-PCS | Mod: 26,TXP,, | Performed by: INTERNAL MEDICINE

## 2021-10-26 RX ORDER — FUROSEMIDE 80 MG/1
80 TABLET ORAL 2 TIMES DAILY
COMMUNITY
Start: 2021-10-22

## 2021-10-26 RX ORDER — ERGOCALCIFEROL 1.25 MG/1
CAPSULE ORAL
COMMUNITY
Start: 2021-02-02 | End: 2023-02-10

## 2021-10-26 RX ORDER — LANCETS
EACH MISCELLANEOUS
COMMUNITY
Start: 2021-06-29 | End: 2023-02-10

## 2021-10-26 RX ORDER — HYDROCODONE BITARTRATE AND ACETAMINOPHEN 7.5; 325 MG/1; MG/1
1 TABLET ORAL EVERY 6 HOURS PRN
Status: ON HOLD | COMMUNITY
Start: 2021-05-10 | End: 2022-09-26 | Stop reason: HOSPADM

## 2021-10-26 RX ORDER — LANCETS 33 GAUGE
EACH MISCELLANEOUS 2 TIMES DAILY
COMMUNITY
Start: 2021-07-18 | End: 2023-02-10

## 2021-10-26 RX ORDER — GENTAMICIN SULFATE 1 MG/G
CREAM TOPICAL
Status: ON HOLD | COMMUNITY
Start: 2021-10-11 | End: 2022-12-22 | Stop reason: ALTCHOICE

## 2021-10-26 RX ORDER — AMLODIPINE BESYLATE 10 MG/1
1 TABLET ORAL
COMMUNITY
Start: 2020-09-02 | End: 2022-09-19 | Stop reason: CLARIF

## 2021-10-26 RX ORDER — REGADENOSON 0.08 MG/ML
0.4 INJECTION, SOLUTION INTRAVENOUS ONCE
Status: COMPLETED | OUTPATIENT
Start: 2021-10-26 | End: 2021-10-26

## 2021-10-26 RX ORDER — BLOOD-GLUCOSE METER
EACH MISCELLANEOUS 2 TIMES DAILY
COMMUNITY
Start: 2021-06-28 | End: 2023-02-10

## 2021-10-26 RX ADMIN — REGADENOSON 0.4 MG: 0.08 INJECTION, SOLUTION INTRAVENOUS at 10:10

## 2021-10-27 DIAGNOSIS — Z76.82 ORGAN TRANSPLANT CANDIDATE: Primary | ICD-10-CM

## 2021-11-05 PROCEDURE — 99001 SPECIMEN HANDLING PT-LAB: CPT | Mod: PO,TXP | Performed by: NURSE PRACTITIONER

## 2021-11-11 ENCOUNTER — LAB VISIT (OUTPATIENT)
Dept: LAB | Facility: HOSPITAL | Age: 56
End: 2021-11-11
Payer: COMMERCIAL

## 2021-11-11 DIAGNOSIS — Z76.82 PRE-KIDNEY TRANSPLANT, LISTED: ICD-10-CM

## 2021-11-17 LAB — HPRA INTERPRETATION: NORMAL

## 2021-12-09 LAB
CLASS I ANTIBODIES - LUMINEX: NEGATIVE
CLASS II ANTIBODY COMMENTS - LUMINEX: NORMAL
CPRA %: 0
HLA FREEZE AND HOLD INTERPRETATION: NORMAL
HLAFH COLLECTION DATE: NORMAL
HPRA INTERPRETATION: NORMAL
SERUM COLLECTION DT - LUMINEX CLASS I: NORMAL
SERUM COLLECTION DT - LUMINEX CLASS II: NORMAL
SPCL1 TESTING DATE: NORMAL
SPCL2 TESTING DATE: NORMAL
SPCLU TESTING DATE: NORMAL

## 2021-12-23 ENCOUNTER — LAB VISIT (OUTPATIENT)
Dept: LAB | Facility: HOSPITAL | Age: 56
End: 2021-12-23
Payer: COMMERCIAL

## 2021-12-23 DIAGNOSIS — Z76.82 PRE-KIDNEY TRANSPLANT, LISTED: ICD-10-CM

## 2022-01-25 LAB — HPRA INTERPRETATION: NORMAL

## 2022-01-25 PROCEDURE — 86833 HLA CLASS II HIGH DEFIN QUAL: CPT | Mod: PO,TXP | Performed by: NURSE PRACTITIONER

## 2022-01-25 PROCEDURE — 86832 HLA CLASS I HIGH DEFIN QUAL: CPT | Mod: PO,TXP | Performed by: NURSE PRACTITIONER

## 2022-01-28 ENCOUNTER — LAB VISIT (OUTPATIENT)
Dept: LAB | Facility: HOSPITAL | Age: 57
End: 2022-01-28
Payer: COMMERCIAL

## 2022-01-28 DIAGNOSIS — Z76.82 PRE-KIDNEY TRANSPLANT, LISTED: ICD-10-CM

## 2022-01-28 DIAGNOSIS — Z76.82 PRE-KIDNEY TRANSPLANT, LISTED: Primary | ICD-10-CM

## 2022-03-09 PROCEDURE — 99001 SPECIMEN HANDLING PT-LAB: CPT | Mod: PO,TXP | Performed by: NURSE PRACTITIONER

## 2022-03-16 PROCEDURE — 86833 HLA CLASS II HIGH DEFIN QUAL: CPT | Mod: PO,TXP | Performed by: NURSE PRACTITIONER

## 2022-03-16 PROCEDURE — 86832 HLA CLASS I HIGH DEFIN QUAL: CPT | Mod: PO,TXP | Performed by: NURSE PRACTITIONER

## 2022-03-17 ENCOUNTER — LAB VISIT (OUTPATIENT)
Dept: LAB | Facility: HOSPITAL | Age: 57
End: 2022-03-17
Payer: COMMERCIAL

## 2022-03-17 DIAGNOSIS — Z76.82 PRE-KIDNEY TRANSPLANT, LISTED: ICD-10-CM

## 2022-03-31 LAB — HPRA INTERPRETATION: NORMAL

## 2022-04-08 PROCEDURE — 99001 SPECIMEN HANDLING PT-LAB: CPT | Mod: PO,TXP | Performed by: NURSE PRACTITIONER

## 2022-04-12 ENCOUNTER — LAB VISIT (OUTPATIENT)
Dept: LAB | Facility: HOSPITAL | Age: 57
End: 2022-04-12
Payer: COMMERCIAL

## 2022-04-12 DIAGNOSIS — Z76.82 PRE-KIDNEY TRANSPLANT, LISTED: ICD-10-CM

## 2022-04-29 ENCOUNTER — TELEPHONE (OUTPATIENT)
Dept: TRANSPLANT | Facility: CLINIC | Age: 57
End: 2022-04-29
Payer: COMMERCIAL

## 2022-04-29 NOTE — TELEPHONE ENCOUNTER
SHERRIE contacted pt via telephone. Pt presents AAOx4 and in good spirits. Pt reports his coworker, Martinez Zapien, is interested in being a living donor in pt's name. 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 Pt reports no other questions/concerns at this time. Pt is aware of how contact SW. SW remains available.       ----- Message from Bri Nieves sent at 4/29/2022  7:42 AM CDT -----  Regarding: call back  Contact: Pt  Pt requesting to speak w/ his  , He's Not Sure of Name     Please call Pt     Phone [phone]

## 2022-05-06 PROCEDURE — 86832 HLA CLASS I HIGH DEFIN QUAL: CPT | Mod: PO,TXP | Performed by: NURSE PRACTITIONER

## 2022-05-06 PROCEDURE — 86833 HLA CLASS II HIGH DEFIN QUAL: CPT | Mod: PO,TXP | Performed by: NURSE PRACTITIONER

## 2022-05-10 ENCOUNTER — LAB VISIT (OUTPATIENT)
Dept: LAB | Facility: HOSPITAL | Age: 57
End: 2022-05-10
Payer: COMMERCIAL

## 2022-05-10 DIAGNOSIS — Z76.82 PRE-KIDNEY TRANSPLANT, LISTED: ICD-10-CM

## 2022-05-13 LAB
CLASS I ANTIBODIES - LUMINEX: NORMAL
CLASS II ANTIBODIES - LUMINEX: NORMAL
CPRA %: 2
SERUM COLLECTION DT - LUMINEX CLASS I: NORMAL
SERUM COLLECTION DT - LUMINEX CLASS II: NORMAL
SPCL1 TESTING DATE: NORMAL
SPCL2 TESTING DATE: NORMAL
SPCLU TESTING DATE: NORMAL

## 2022-05-31 LAB — HPRA INTERPRETATION: NORMAL

## 2022-06-10 PROCEDURE — 99001 SPECIMEN HANDLING PT-LAB: CPT | Mod: PO,TXP | Performed by: NURSE PRACTITIONER

## 2022-06-14 ENCOUNTER — LAB VISIT (OUTPATIENT)
Dept: LAB | Facility: HOSPITAL | Age: 57
End: 2022-06-14
Payer: COMMERCIAL

## 2022-06-14 DIAGNOSIS — Z76.82 PRE-KIDNEY TRANSPLANT, LISTED: ICD-10-CM

## 2022-07-11 PROCEDURE — 86833 HLA CLASS II HIGH DEFIN QUAL: CPT | Mod: PO,TXP | Performed by: NURSE PRACTITIONER

## 2022-07-11 PROCEDURE — 86832 HLA CLASS I HIGH DEFIN QUAL: CPT | Mod: PO,TXP | Performed by: NURSE PRACTITIONER

## 2022-07-13 ENCOUNTER — LAB VISIT (OUTPATIENT)
Dept: LAB | Facility: HOSPITAL | Age: 57
End: 2022-07-13
Payer: COMMERCIAL

## 2022-07-13 DIAGNOSIS — Z76.82 PRE-KIDNEY TRANSPLANT, LISTED: ICD-10-CM

## 2022-07-20 LAB — HPRA INTERPRETATION: NORMAL

## 2022-08-02 DIAGNOSIS — Z76.82 ORGAN TRANSPLANT CANDIDATE: ICD-10-CM

## 2022-08-02 DIAGNOSIS — Z12.5 SCREENING PSA (PROSTATE SPECIFIC ANTIGEN): Primary | ICD-10-CM

## 2022-08-08 PROCEDURE — 99001 SPECIMEN HANDLING PT-LAB: CPT | Mod: PO,TXP | Performed by: NURSE PRACTITIONER

## 2022-08-12 ENCOUNTER — LAB VISIT (OUTPATIENT)
Dept: LAB | Facility: HOSPITAL | Age: 57
End: 2022-08-12
Payer: COMMERCIAL

## 2022-08-12 DIAGNOSIS — Z76.82 PRE-KIDNEY TRANSPLANT, LISTED: ICD-10-CM

## 2022-09-09 PROCEDURE — 86832 HLA CLASS I HIGH DEFIN QUAL: CPT | Mod: PO,TXP | Performed by: NURSE PRACTITIONER

## 2022-09-09 PROCEDURE — 86833 HLA CLASS II HIGH DEFIN QUAL: CPT | Mod: PO,TXP | Performed by: NURSE PRACTITIONER

## 2022-09-13 ENCOUNTER — LAB VISIT (OUTPATIENT)
Dept: LAB | Facility: HOSPITAL | Age: 57
End: 2022-09-13
Payer: COMMERCIAL

## 2022-09-13 DIAGNOSIS — Z76.82 PRE-KIDNEY TRANSPLANT, LISTED: ICD-10-CM

## 2022-09-16 ENCOUNTER — TELEPHONE (OUTPATIENT)
Dept: TRANSPLANT | Facility: CLINIC | Age: 57
End: 2022-09-16
Payer: COMMERCIAL

## 2022-09-16 DIAGNOSIS — Z76.82 ORGAN TRANSPLANT CANDIDATE: Primary | ICD-10-CM

## 2022-09-16 NOTE — TELEPHONE ENCOUNTER
----- Message from Hardeep Jesus sent at 9/16/2022  9:52 AM CDT -----  Regarding: Letter sent  Patient calling to notify that he will be mailing the letter sent with signatures today.    Call: [phone] (Mobile

## 2022-09-19 PROBLEM — M48.062 SPINAL STENOSIS OF LUMBAR REGION WITH NEUROGENIC CLAUDICATION: Status: ACTIVE | Noted: 2022-09-19

## 2022-09-22 LAB — HPRA INTERPRETATION: NORMAL

## 2022-10-03 ENCOUNTER — TELEPHONE (OUTPATIENT)
Dept: TRANSPLANT | Facility: CLINIC | Age: 57
End: 2022-10-03
Payer: COMMERCIAL

## 2022-10-03 NOTE — TELEPHONE ENCOUNTER
Patient states he had back surgery last week and will need rehab. States his doctor told him he will likely not be able to have any procedures until January 2023. Appts cancelled and patient made temporarily inactive on the wait list.

## 2022-10-03 NOTE — TELEPHONE ENCOUNTER
----- Message from Gael Yousif sent at 10/3/2022 11:25 AM CDT -----  Regarding: call back  Pt call to speak with Pamela in regards to resshedule appt due to him having back surgery requesting call back    Call

## 2022-10-14 PROCEDURE — 99001 SPECIMEN HANDLING PT-LAB: CPT | Mod: PO,TXP | Performed by: NURSE PRACTITIONER

## 2022-10-18 ENCOUNTER — LAB VISIT (OUTPATIENT)
Dept: LAB | Facility: HOSPITAL | Age: 57
End: 2022-10-18
Payer: COMMERCIAL

## 2022-10-18 DIAGNOSIS — Z76.82 PRE-KIDNEY TRANSPLANT, LISTED: ICD-10-CM

## 2022-11-04 PROCEDURE — 86833 HLA CLASS II HIGH DEFIN QUAL: CPT | Mod: PO,TXP | Performed by: NURSE PRACTITIONER

## 2022-11-04 PROCEDURE — 86832 HLA CLASS I HIGH DEFIN QUAL: CPT | Mod: PO,TXP | Performed by: NURSE PRACTITIONER

## 2022-11-15 ENCOUNTER — LAB VISIT (OUTPATIENT)
Dept: LAB | Facility: HOSPITAL | Age: 57
End: 2022-11-15
Payer: COMMERCIAL

## 2022-11-15 DIAGNOSIS — Z76.82 PRE-KIDNEY TRANSPLANT, LISTED: ICD-10-CM

## 2022-11-30 LAB — HPRA INTERPRETATION: NORMAL

## 2022-12-02 PROCEDURE — 99001 SPECIMEN HANDLING PT-LAB: CPT | Mod: PO,TXP | Performed by: NURSE PRACTITIONER

## 2022-12-09 ENCOUNTER — LAB VISIT (OUTPATIENT)
Dept: LAB | Facility: HOSPITAL | Age: 57
End: 2022-12-09
Payer: COMMERCIAL

## 2022-12-09 DIAGNOSIS — Z76.82 PRE-KIDNEY TRANSPLANT, LISTED: ICD-10-CM

## 2022-12-22 PROBLEM — J96.01 ACUTE HYPOXEMIC RESPIRATORY FAILURE: Status: ACTIVE | Noted: 2022-12-22

## 2023-01-10 LAB
CLASS I ANTIBODY COMMENTS - LUMINEX: NORMAL
CLASS II ANTIBODY COMMENTS - LUMINEX: NORMAL
CPRA %: 0
SERUM COLLECTION DT - LUMINEX CLASS I: NORMAL
SERUM COLLECTION DT - LUMINEX CLASS II: NORMAL
SPCL1 TESTING DATE: NORMAL
SPCL2 TESTING DATE: NORMAL
SPCLU TESTING DATE: NORMAL

## 2023-01-12 ENCOUNTER — HOSPITAL ENCOUNTER (OUTPATIENT)
Dept: CARDIOLOGY | Facility: HOSPITAL | Age: 58
Discharge: HOME | End: 2023-01-12
Attending: NURSE PRACTITIONER
Payer: COMMERCIAL

## 2023-01-12 ENCOUNTER — OFFICE VISIT (OUTPATIENT)
Dept: TRANSPLANT | Facility: CLINIC | Age: 58
End: 2023-01-12
Attending: NURSE PRACTITIONER
Payer: COMMERCIAL

## 2023-01-12 VITALS
OXYGEN SATURATION: 97 % | BODY MASS INDEX: 31.11 KG/M2 | HEART RATE: 73 BPM | SYSTOLIC BLOOD PRESSURE: 199 MMHG | HEIGHT: 66 IN | DIASTOLIC BLOOD PRESSURE: 83 MMHG | WEIGHT: 193.56 LBS | TEMPERATURE: 97 F | RESPIRATION RATE: 20 BRPM

## 2023-01-12 VITALS
HEIGHT: 67 IN | DIASTOLIC BLOOD PRESSURE: 80 MMHG | BODY MASS INDEX: 32.49 KG/M2 | SYSTOLIC BLOOD PRESSURE: 180 MMHG | HEART RATE: 60 BPM | WEIGHT: 207 LBS

## 2023-01-12 DIAGNOSIS — Z99.2 ESRD ON PERITONEAL DIALYSIS: ICD-10-CM

## 2023-01-12 DIAGNOSIS — Z76.82 ORGAN TRANSPLANT CANDIDATE: ICD-10-CM

## 2023-01-12 DIAGNOSIS — N18.6 ESRD ON PERITONEAL DIALYSIS: ICD-10-CM

## 2023-01-12 DIAGNOSIS — Z79.01 LONG TERM (CURRENT) USE OF ANTICOAGULANTS: Chronic | ICD-10-CM

## 2023-01-12 DIAGNOSIS — Z76.82 PATIENT ON WAITING LIST FOR KIDNEY TRANSPLANT: Primary | Chronic | ICD-10-CM

## 2023-01-12 DIAGNOSIS — I12.9 RENAL HYPERTENSION: Chronic | ICD-10-CM

## 2023-01-12 DIAGNOSIS — Z76.82 ORGAN TRANSPLANT CANDIDATE: Primary | ICD-10-CM

## 2023-01-12 DIAGNOSIS — N25.81 SECONDARY HYPERPARATHYROIDISM: ICD-10-CM

## 2023-01-12 DIAGNOSIS — R93.1 ABNORMAL ECHOCARDIOGRAM: Primary | ICD-10-CM

## 2023-01-12 LAB
ASCENDING AORTA: 4.88 CM
AV INDEX (PROSTH): 0.49
AV MEAN GRADIENT: 12 MMHG
AV PEAK GRADIENT: 26 MMHG
AV VALVE AREA: 2.43 CM2
AV VELOCITY RATIO: 0.46
BSA FOR ECHO PROCEDURE: 2.11 M2
CV ECHO LV RWT: 0.48 CM
DOP CALC AO PEAK VEL: 2.57 M/S
DOP CALC AO VTI: 54.41 CM
DOP CALC LVOT AREA: 4.9 CM2
DOP CALC LVOT DIAMETER: 2.51 CM
DOP CALC LVOT PEAK VEL: 1.19 M/S
DOP CALC LVOT STROKE VOLUME: 132 CM3
DOP CALCLVOT PEAK VEL VTI: 26.69 CM
E WAVE DECELERATION TIME: 265.02 MSEC
E/A RATIO: 0.76
E/E' RATIO: 16.36 M/S
ECHO LV POSTERIOR WALL: 1.3 CM (ref 0.6–1.1)
EJECTION FRACTION: 63 %
FRACTIONAL SHORTENING: 35 % (ref 28–44)
INTERVENTRICULAR SEPTUM: 0.96 CM (ref 0.6–1.1)
IVRT: 102.76 MSEC
LA MAJOR: 5.71 CM
LA MINOR: 5.74 CM
LA WIDTH: 4.95 CM
LEFT ATRIUM SIZE: 4.92 CM
LEFT ATRIUM VOLUME INDEX MOD: 46.2 ML/M2
LEFT ATRIUM VOLUME INDEX: 57.8 ML/M2
LEFT ATRIUM VOLUME MOD: 94.79 CM3
LEFT ATRIUM VOLUME: 118.51 CM3
LEFT INTERNAL DIMENSION IN SYSTOLE: 3.52 CM (ref 2.1–4)
LEFT VENTRICLE DIASTOLIC VOLUME INDEX: 68.42 ML/M2
LEFT VENTRICLE DIASTOLIC VOLUME: 140.27 ML
LEFT VENTRICLE MASS INDEX: 118 G/M2
LEFT VENTRICLE SYSTOLIC VOLUME INDEX: 25.2 ML/M2
LEFT VENTRICLE SYSTOLIC VOLUME: 51.56 ML
LEFT VENTRICULAR INTERNAL DIMENSION IN DIASTOLE: 5.38 CM (ref 3.5–6)
LEFT VENTRICULAR MASS: 242.07 G
LV LATERAL E/E' RATIO: 18 M/S
LV SEPTAL E/E' RATIO: 15 M/S
MV A" WAVE DURATION": 29.97 MSEC
MV PEAK A VEL: 1.19 M/S
MV PEAK E VEL: 0.9 M/S
MV STENOSIS PRESSURE HALF TIME: 76.85 MS
MV VALVE AREA P 1/2 METHOD: 2.86 CM2
PISA TR MAX VEL: 2.56 M/S
PULM VEIN S/D RATIO: 0.8
PV PEAK D VEL: 0.51 M/S
PV PEAK S VEL: 0.41 M/S
RA MAJOR: 4.92 CM
RA PRESSURE: 3 MMHG
RA WIDTH: 3.83 CM
RIGHT VENTRICULAR END-DIASTOLIC DIMENSION: 3.6 CM
RV TISSUE DOPPLER FREE WALL SYSTOLIC VELOCITY 1 (APICAL 4 CHAMBER VIEW): 18.85 CM/S
SINUS: 4.83 CM
STJ: 4.04 CM
TDI LATERAL: 0.05 M/S
TDI SEPTAL: 0.06 M/S
TDI: 0.06 M/S
TR MAX PG: 26 MMHG
TRICUSPID ANNULAR PLANE SYSTOLIC EXCURSION: 3.1 CM
TV REST PULMONARY ARTERY PRESSURE: 29 MMHG

## 2023-01-12 PROCEDURE — 3079F PR MOST RECENT DIASTOLIC BLOOD PRESSURE 80-89 MM HG: ICD-10-PCS | Mod: CPTII,S$GLB,TXP, | Performed by: NURSE PRACTITIONER

## 2023-01-12 PROCEDURE — 1159F PR MEDICATION LIST DOCUMENTED IN MEDICAL RECORD: ICD-10-PCS | Mod: CPTII,S$GLB,TXP, | Performed by: NURSE PRACTITIONER

## 2023-01-12 PROCEDURE — 99999 PR PBB SHADOW E&M-EST. PATIENT-LVL V: CPT | Mod: PBBFAC,TXP,, | Performed by: NURSE PRACTITIONER

## 2023-01-12 PROCEDURE — 93306 TTE W/DOPPLER COMPLETE: CPT | Mod: 26,TXP,, | Performed by: INTERNAL MEDICINE

## 2023-01-12 PROCEDURE — 99215 PR OFFICE/OUTPT VISIT, EST, LEVL V, 40-54 MIN: ICD-10-PCS | Mod: S$GLB,TXP,, | Performed by: NURSE PRACTITIONER

## 2023-01-12 PROCEDURE — 99999 PR PBB SHADOW E&M-EST. PATIENT-LVL V: ICD-10-PCS | Mod: PBBFAC,TXP,, | Performed by: NURSE PRACTITIONER

## 2023-01-12 PROCEDURE — 3008F PR BODY MASS INDEX (BMI) DOCUMENTED: ICD-10-PCS | Mod: CPTII,S$GLB,TXP, | Performed by: NURSE PRACTITIONER

## 2023-01-12 PROCEDURE — 3008F BODY MASS INDEX DOCD: CPT | Mod: CPTII,S$GLB,TXP, | Performed by: NURSE PRACTITIONER

## 2023-01-12 PROCEDURE — 1159F MED LIST DOCD IN RCRD: CPT | Mod: CPTII,S$GLB,TXP, | Performed by: NURSE PRACTITIONER

## 2023-01-12 PROCEDURE — 93306 TTE W/DOPPLER COMPLETE: CPT | Mod: TXP

## 2023-01-12 PROCEDURE — 86832 HLA CLASS I HIGH DEFIN QUAL: CPT | Mod: PO,TXP | Performed by: NURSE PRACTITIONER

## 2023-01-12 PROCEDURE — 86833 HLA CLASS II HIGH DEFIN QUAL: CPT | Mod: PO,TXP | Performed by: NURSE PRACTITIONER

## 2023-01-12 PROCEDURE — 3079F DIAST BP 80-89 MM HG: CPT | Mod: CPTII,S$GLB,TXP, | Performed by: NURSE PRACTITIONER

## 2023-01-12 PROCEDURE — 3077F PR MOST RECENT SYSTOLIC BLOOD PRESSURE >= 140 MM HG: ICD-10-PCS | Mod: CPTII,S$GLB,TXP, | Performed by: NURSE PRACTITIONER

## 2023-01-12 PROCEDURE — 3077F SYST BP >= 140 MM HG: CPT | Mod: CPTII,S$GLB,TXP, | Performed by: NURSE PRACTITIONER

## 2023-01-12 PROCEDURE — 99215 OFFICE O/P EST HI 40 MIN: CPT | Mod: S$GLB,TXP,, | Performed by: NURSE PRACTITIONER

## 2023-01-12 PROCEDURE — 93306 ECHO (CUPID ONLY): ICD-10-PCS | Mod: 26,TXP,, | Performed by: INTERNAL MEDICINE

## 2023-01-12 NOTE — PROGRESS NOTES
Kidney Transplant Recipient Reevalulation    Referring Physician: Kamran Adorno  Current Nephrologist: Kamran Adorno  Waitlist Status: inactive  Dialysis Start Date: 1/17/2020    Subjective:     CC:  Annual reassessment of kidney transplant candidacy.    HPI:  Mr. Lisa is a 57 y.o. year old Black or  male with ESRD secondary to diabetic nephropathy.  He has been on the wait list for a kidney transplant at [facility] since 1/17/2020. History of CVA in 2016 suspected related to atrial fibrillation, remains on eliquis. Patient is currently on peritoneal dialysis started on March 2021, was previously on hemodialysis since 1/17/2020. Patient is dialyzing on cyclic peritoneal dialysis.  Patient reports that he is tolerating dialysis well. . He has a LUE AV fistula and PD catheter. Denies peritonitis or exit site infections.     Currently inactive since 10/3/2022 due to requiring back surgery and rehab. On 9/26/2022 underwent LAMINECTOMY, SPINE, LUMBAR, WITH DISCECTOMY, HEMILAMINECTOMY, RIGHT L4-5 (Right), FACETECTOMY, PARTIAL MEDIAL (Right), FORAMINOTOMY, SPINE (Right), EXCISION, MASS, DISC EXTRUSION (Right).    He has now completed rehab, and functional status near baseline. He uses a rolling walker occasionally. Was able to walk up and down clinic hallway with me without issues. Has returned to work at large warehouse. Looks good, not frail.    Hospitalized 12/22/2022 for respiratory distress after issue with running PD. Resolved with dialysis.    CXR 12/22/2022: Bibasilar opacities, greater on the right, may reflect pulmonary edema.   PXR 10/13/2021: favorable for transplant.    Renal US 10/13/2021: medical renal disease.  Iliacs 10/1/2019: favorable for transplant.   Echo 1/12/2023: EF 63%, PA 29, abnormal septal wall motion with small area of hypokinesis  Stress 10/26/2021: no evidence of myocardial ischemia or infarction.   Colonoscopy 1/16/2020: normal colon, repeat in 10 years     Current  Outpatient Medications   Medication Sig Dispense Refill    allopurinol (ZYLOPRIM) 100 MG tablet Take 50 mg by mouth once daily.      apixaban (ELIQUIS) 2.5 mg Tab Take 1 tablet (2.5 mg total) by mouth 2 (two) times daily. Resume 10 days after procedure, 10/6/2022      atorvastatin (LIPITOR) 40 MG tablet Take 40 mg by mouth every evening.       benazepriL (LOTENSIN) 40 MG tablet Take 40 mg by mouth once daily.      brimonidine-timoloL (COMBIGAN) 0.2-0.5 % Drop Place 1 drop into both eyes 2 (two) times daily as needed.      calcium carbonate (TUMS) 300 mg (750 mg) Chew Take 3,000 mg by mouth 3 (three) times daily.      cloNIDine (CATAPRES) 0.1 MG tablet Take 0.1 mg by mouth once daily. If SBP is > 145      doxazosin (CARDURA) 8 MG Tab Take 4 mg by mouth every 12 (twelve) hours.      ergocalciferol (ERGOCALCIFEROL) 50,000 unit Cap Take by mouth.      ERGOCALCIFEROL, VITAMIN D2, ORAL Take 1.25 mg by mouth once a week.      folic acid/vit B complex and C (NEPHRO-ALLY ORAL) Take 0.8 mg by mouth once daily.      furosemide (LASIX) 80 MG tablet Take 80 mg by mouth 2 (two) times a day.      NIFEdipine (ADALAT CC) 60 MG TbSR Take 60 mg by mouth once daily.      ONETOUCH DELICA PLUS LANCET 33 gauge Misc Apply topically 2 (two) times daily.      ONETOUCH ULTRASOFT LANCETS lancets TEST BLOOD GLUCOSE TWICE DAILY      ONETOUCH VERIO TEST STRIPS Strp 2 (two) times a day. Test      pioglitazone (ACTOS) 30 MG tablet Take 30 mg by mouth once daily.      traMADoL (ULTRAM) 50 mg tablet Take 50 mg by mouth every 8 (eight) hours as needed for Pain.       No current facility-administered medications for this visit.       Past Medical History:   Diagnosis Date    Anticoagulant long-term use     Arthritis     Diabetes mellitus     Dialysis patient     peritineal dialysis nightly    Gout, unspecified     Hypertension     Renal disorder     Stroke     TIA (transient ischemic attack) 10/04/2019       Review of Systems   Constitutional:   "Negative for activity change, appetite change and fever.   HENT:  Negative for congestion, mouth sores and sore throat.    Eyes:  Positive for visual disturbance.        Wears glasses   Respiratory:  Negative for cough, chest tightness and shortness of breath.    Cardiovascular:  Negative for chest pain, palpitations and leg swelling.   Gastrointestinal:  Negative for abdominal distention, abdominal pain, constipation, diarrhea and nausea.   Genitourinary:  Negative for difficulty urinating, frequency and hematuria.   Musculoskeletal:  Positive for back pain. Negative for arthralgias and gait problem.   Skin:  Negative for wound.   Allergic/Immunologic: Negative for environmental allergies, food allergies and immunocompromised state.   Neurological:  Negative for dizziness, weakness and numbness.   Hematological:  Bruises/bleeds easily.        On eliquis    Psychiatric/Behavioral:  Negative for sleep disturbance. The patient is not nervous/anxious.      Objective:   body mass index is 31.48 kg/m².  BP (!) 199/83 (BP Location: Right arm, Patient Position: Sitting, BP Method: Large (Automatic))   Pulse 73   Temp 97.2 °F (36.2 °C) (Temporal)   Resp 20   Ht 5' 5.75" (1.67 m)   Wt 87.8 kg (193 lb 9 oz)   SpO2 97%   BMI 31.48 kg/m²     Physical Exam  Vitals and nursing note reviewed.   Constitutional:       Appearance: Normal appearance.   HENT:      Head: Normocephalic.   Cardiovascular:      Rate and Rhythm: Normal rate and regular rhythm.      Heart sounds: Normal heart sounds.   Pulmonary:      Effort: Pulmonary effort is normal.      Breath sounds: Normal breath sounds.   Abdominal:      General: Bowel sounds are normal. There is no distension.      Palpations: Abdomen is soft.      Tenderness: There is no abdominal tenderness.      Comments: PD catheter , no erythema, edema, tenderness or drainage.    Musculoskeletal:         General: Normal range of motion.      Comments: LUE AV fistula + thrill    Skin:     " General: Skin is warm and dry.   Neurological:      General: No focal deficit present.      Mental Status: He is alert.   Psychiatric:         Behavior: Behavior normal.       Labs:  PSA, Screen (ng/mL)   Date Value   01/12/2023 1.3     Lab Results   Component Value Date    WBC 5.70 12/23/2022    HGB 11.5 (L) 12/23/2022    HCT 35.9 (L) 12/23/2022     12/23/2022    K 4.2 12/23/2022    CL 99 12/23/2022    CO2 29 12/23/2022    BUN 64 (H) 12/23/2022    CREATININE 18.49 (H) 12/23/2022    EGFRNONAA 8 (A) 01/09/2020    CALCIUM 8.0 (L) 12/23/2022    PHOS 7.03 (H) 12/22/2022    MG 2.2 12/22/2022    ALBUMIN 3.7 12/23/2022    AST 25 12/23/2022    ALT 15 12/23/2022    UTPCR 3.90 (H) 10/01/2019    .7 (H) 01/12/2023       Lab Results   Component Value Date    BILIRUBINUA Negative 10/01/2019    PROTEINUA 2+ (A) 10/01/2019    NITRITE Negative 10/01/2019    RBCUA 5 (H) 10/01/2019    WBCUA 4 10/01/2019       Lab Results   Component Value Date    CPRA 0 11/04/2022    CPRA 0 11/04/2022    CPRA 0 11/04/2022    SO2OFIT B37 09/09/2022    CIABCLM WEAK---B37 11/04/2022    CIIAB DP1 03/16/2022    ABCMT WEAK---DP1 11/04/2022       Labs were reviewed with the patient.    Pre-transplant Workup:   Reviewed with the patient.    Assessment:     1. Patient on waiting list for kidney transplant    2. ESRD on peritoneal dialysis    3. Renal hypertension    4. Long term (current) use of anticoagulants--Eliquis    5. Secondary hyperparathyroidism    6. BMI 31.0-31.9,adult      Plan:   Currently inactive since 10/3/2022 due to requiring back surgery and rehab.  His functional status is improved near baseline-able to ambulate up and down clinic hallway with me without any issues. Needs cardiology evaluation for abnormal septal wall motion with small area of hypokinesis on today's echo.  He is scheduled to see Dr. Yang at [facility] next month. I provided him a copy of today's echo for him to review at his visit. OK to re-activate pending cardiology  visit/clearance.     Transplant Candidacy:   Mr. Lisa is a suitable kidney transplant candidate PENDING cardiology visit/clearance.   Meets center eligibility for accepting HCV+ donor offer - yes.  Patient educated on HCV+ donors. Chidi is willing  to accept HCV+ donor offer -  yes   Patient is a candidate for KDPI > 85 kidney donor offer - no (weight>88kg)  He remains in overall stable health, and will remain active on the transplant list.    Mabel Bautista NP       Follow-up:   In addition to the tests noted in the plan, Mr. Lisa will continue to have reevaluation as per the standing pre-kidney transplant protocol:  Monthly blood for PRA  Annual return to clinic, except HIV positive, > 65 years of age, or pancreas transplant candidates who will be scheduled to see transplant every 6 months while in pre-transplant phase  Annual re-testing: CXR, EKG, yearly mammograms for women over 40 and PSA for males over 40, cardiology follow-up as recommended by initial cardiology pre-transplant evaluation  Renal ultrasound every 2 years  Baseline colonoscopy after age 50 and repeated as recommended    UNOS Patient Status  Functional Status: 60% - Requires occasional assistance but is able to care for needs  Physical Capacity: No Limitations

## 2023-01-12 NOTE — LETTER
January 17, 2023        Kamran Adorno  [address]  [address]  [place] [address]  Phone: [phone]  Fax: [phone]             [facility]- Transplant 1st Fl  [address]  [facility] [address]  Phone: [phone]   Patient: Chidi Lisa   MR Number: [id number]   YOB: 1965   Date of Visit: 1/12/2023       Dear Dr. Kamran Adorno    Thank you for referring Chidi Lisa to me for evaluation. Attached you will find relevant portions of my assessment and plan of care.    If you have questions, please do not hesitate to call me. I look forward to following Chidi Lisa along with you.    Sincerely,    Mabel Bautista NP    Enclosure    If you would like to receive this communication electronically, please contact [email or web address] or [phone] to request [employer] Link access.    [employer] Link is a tool which provides read-only access to select patient information with whom you have a relationship. Its easy to use and provides real time access to review your patients record including encounter summaries, notes, results, and demographic information.    If you feel you have received this communication in error or would no longer like to receive these types of communications, please e-mail [email or web address]

## 2023-01-12 NOTE — PROGRESS NOTES
INITIAL PATIENT EDUCATION NOTE    Mr. Chidi Lisa was seen in pre-kidney transplant clinic for evaluation for kidney, kidney/pancreas or pancreas only transplant.  The patient attended a an individual video education session that discussed/reviewed the following aspects of transplantation: evaluation including diagnostic and laboratory testing,( Chemistries, Hematology, Serologies including HIV and Hepatitis and HLA) required for transplantation and selection committee process, UNOS waitlist management/multiple listings, types of organs offered (KDPI < 85%, KDPI > 85%, PHS risk, DCD, HCV+, HIV+ for HIV+ recipients and enbloc/dual), financial aspects, surgical procedures, dietary instruction pre- and post-transplant, health maintenance pre- and post-transplant, post-transplant hospitalization and outpatient follow-up, potential to participate in a research protocol, and medication management and side effects.  A question and answer session was provided after the presentation.    The patient was seen by all members of the multi-disciplinary team to include: Nephrologist/CARLOS, Surgeon, , Transplant Coordinator, , Pharmacist and Dietician (if applicable).    The patient reviewed and signed all consents for evaluation which were witnessed and sent to scanning into the [facility] chart.    The patient was given an education book and plan for further evaluation based on his individual assessment.      Reviewed program requirement for complete COVID vaccination with documentation prior to listing.  COVID education information reviewed with patient. Patient encouraged to be up to date on all vaccinations.       The patient was informed that the transplant team would manage immediate post op pain. If the patient requires long term pain management, they will need to have that pain management addressed by their PCP or previous provider who wrote for long term pain medicines.    The patient  was encouraged to call with any questions or concerns.

## 2023-01-13 ENCOUNTER — EPISODE CHANGES (OUTPATIENT)
Dept: TRANSPLANT | Facility: CLINIC | Age: 58
End: 2023-01-13

## 2023-01-18 ENCOUNTER — LAB VISIT (OUTPATIENT)
Dept: LAB | Facility: HOSPITAL | Age: 58
End: 2023-01-18
Payer: COMMERCIAL

## 2023-01-18 DIAGNOSIS — Z76.82 ORGAN TRANSPLANT CANDIDATE: ICD-10-CM

## 2023-01-30 LAB — HPRA INTERPRETATION: NORMAL

## 2023-01-30 NOTE — PROGRESS NOTES
Transplant Psychosocial Evaluation Update:  Last psychosocial evaluation for transplant was completed on 10/26/21 by Amira    Pt presents today alone. Pt presents as alert, oriented to person, place, time, purpose of visit. Pt denies concerns about going through with transplant and state motivation and sense of preparedness for transplantation, caregiver role, psychosocial risk factors, medical risk factors, financial aspects, and lifetime commitments. All concerns and education points as per transplant psychosocial evaluation process addressed (also refer to psychosocial dated 10/26/21). Pt actively participated in today's interview. Pt asking questions appropriately and denies changes to previous psychosocial evaluation for transplantation which we reviewed line by line with pt.    Primary Caregivers and Transportation for Transplant:  1. Aslhey Lisa, 55 y/o wife, [phone], drives  2. Tatiana Morocho, 36 y/o dtr, [phone], drives.  3. Fidel Millan, 48 y/o friend, [phone], drives    Both pt and caregiver/s plan to stay locally at either return to home in [place] or at lodging arranged by themselves.  Pt and caregiver informed that lodging assistance will be unavailable beginning 2023.  - information reviewed in depth.  Caregivers plan to stay at hospital as appropriate for transplant and post-transplant process.    Pts Vocation: Pt works at [employer] as a .  Last day worked:  1/1/23.    DME: BP cuff, diabetic supplies and equipment, PD equipment and supplies..     ADLS:  Pt states ability to independently accomplish all adls.     Household and Dependents: pt resides with wife and their dog and has no dependents at this time.    Income: Pt states ability to afford all monthly expenses and costs including for medications now and if transplanted based on income and on savings and assets.    Dialysis Adherence: Pt states current and expected compliance with all healthcare  recommendations including dialysis.     Pt and wife deny any additional concerns, stating preparedness and motivation to proceed with organ transplant.     Suitability for Transplant:   Pt remains low risk for transplant at this time based on psychosocial risk factors and strengths.    Pt aleta well overall with health needs and life in general, denies current or past suicidal/homicidal ideation, has stable supports, adequate insurance, financial stability, transportation and caregiver plans in place, and is using no substances unless prescribed.     Payer/Plan Subscr  Sex Relation Sub. Ins. ID Effective Group Num   1. BLUE CROSS BL* YARELY EARL* 1965 Male Self [id number]                                   [address] [address]

## 2023-02-09 LAB
CLASS I ANTIBODIES - LUMINEX: NEGATIVE
CLASS II ANTIBODY COMMENTS - LUMINEX: NORMAL
CPRA %: 0
SERUM COLLECTION DT - LUMINEX CLASS I: NORMAL
SERUM COLLECTION DT - LUMINEX CLASS II: NORMAL
SPCL1 TESTING DATE: NORMAL
SPCL2 TESTING DATE: NORMAL
SPCLU TESTING DATE: NORMAL

## 2023-02-10 ENCOUNTER — TELEPHONE (OUTPATIENT)
Dept: TRANSPLANT | Facility: CLINIC | Age: 58
End: 2023-02-10
Payer: COMMERCIAL

## 2023-02-10 NOTE — TELEPHONE ENCOUNTER
Patient called to inform that he will have an angiogram on 2/27/23 at [facility] per CIS Juan Yang (Cardiology). Will request stress test and echo patient had a CIS today.

## 2023-02-10 NOTE — TELEPHONE ENCOUNTER
----- Message from Pamela Landin RN sent at 2/10/2023  9:51 AM CST -----  Regarding: FW: Follow up    ----- Message -----  From: Hardeep Jesus  Sent: 2/10/2023   9:12 AM CST  To: Michele ARRIETA Staff  Subject: Follow up                                        Patient calling to follow up with staff. Patient states this is for the transplant list with heart doctor today at [facility].      Call: [phone]

## 2023-02-11 PROCEDURE — 99001 SPECIMEN HANDLING PT-LAB: CPT | Mod: PO,TXP | Performed by: NURSE PRACTITIONER

## 2023-02-27 PROBLEM — R94.39 ABNORMAL MYOCARDIAL PERFUSION STUDY: Status: ACTIVE | Noted: 2023-02-27

## 2023-02-27 PROBLEM — N18.5 CHRONIC KIDNEY DISEASE (CKD), STAGE V: Status: ACTIVE | Noted: 2023-02-27

## 2023-02-27 PROBLEM — I25.10 CAD (CORONARY ARTERY DISEASE): Status: ACTIVE | Noted: 2023-02-27

## 2023-03-01 ENCOUNTER — LAB VISIT (OUTPATIENT)
Dept: LAB | Facility: HOSPITAL | Age: 58
End: 2023-03-01
Payer: COMMERCIAL

## 2023-03-01 ENCOUNTER — TELEPHONE (OUTPATIENT)
Dept: TRANSPLANT | Facility: CLINIC | Age: 58
End: 2023-03-01
Payer: COMMERCIAL

## 2023-03-01 DIAGNOSIS — Z76.82 PRE-KIDNEY TRANSPLANT, LISTED: ICD-10-CM

## 2023-03-01 NOTE — TELEPHONE ENCOUNTER
I reviewed patient's chart and returned his call. Patient had angiogram on 2/27/2023. I informed patient that I will send to the transplant nephrologist for request to re-activate on list. I informed him that he will need to follow-up with his primary cardiologist on 3/8/2023 as scheduled to review the test. He verbalized understanding.  Routed note to vikas for follow-up.        ----- Message from Ina Lucero RN sent at 3/1/2023  9:00 AM CST -----  Regarding: FW: call back    ----- Message -----  From: Gael Yousif  Sent: 3/1/2023   8:58 AM CST  To: Kidney Waitlisted Coordinator  Subject: call back                                        Pt call to speak with Vikas in regards to his test requesting call back    Call

## 2023-03-03 ENCOUNTER — TELEPHONE (OUTPATIENT)
Dept: TRANSPLANT | Facility: CLINIC | Age: 58
End: 2023-03-03
Payer: COMMERCIAL

## 2023-03-03 NOTE — TELEPHONE ENCOUNTER
I returned patient call regarding his heart cath. I reviewed with Dr. Hart and she is okay with re-activation.  I informed him that he is now active on the list and will receive a letter in the mail stating that his status is active. Verbalized understanding.

## 2023-03-27 PROBLEM — J96.01 ACUTE HYPOXEMIC RESPIRATORY FAILURE: Status: RESOLVED | Noted: 2022-12-22 | Resolved: 2023-03-27

## 2023-04-18 PROCEDURE — 86833 HLA CLASS II HIGH DEFIN QUAL: CPT | Mod: PO,TXP | Performed by: NURSE PRACTITIONER

## 2023-04-18 PROCEDURE — 86832 HLA CLASS I HIGH DEFIN QUAL: CPT | Mod: PO,TXP | Performed by: NURSE PRACTITIONER

## 2023-04-25 ENCOUNTER — LAB VISIT (OUTPATIENT)
Dept: LAB | Facility: HOSPITAL | Age: 58
End: 2023-04-25
Payer: COMMERCIAL

## 2023-04-25 DIAGNOSIS — Z76.82 ORGAN TRANSPLANT CANDIDATE: ICD-10-CM

## 2023-05-05 LAB — HPRA INTERPRETATION: NORMAL

## 2023-07-11 PROCEDURE — 86832 HLA CLASS I HIGH DEFIN QUAL: CPT | Mod: PO,TXP | Performed by: NURSE PRACTITIONER

## 2023-07-11 PROCEDURE — 86833 HLA CLASS II HIGH DEFIN QUAL: CPT | Mod: PO,TXP | Performed by: NURSE PRACTITIONER

## 2023-07-16 PROBLEM — N18.6 ESRD (END STAGE RENAL DISEASE): Status: ACTIVE | Noted: 2023-07-16

## 2023-07-16 PROBLEM — I21.4 NSTEMI (NON-ST ELEVATED MYOCARDIAL INFARCTION): Status: ACTIVE | Noted: 2023-07-16

## 2023-07-16 PROBLEM — R06.02 SOB (SHORTNESS OF BREATH): Status: ACTIVE | Noted: 2023-07-16

## 2023-07-16 PROBLEM — I16.0 HYPERTENSIVE URGENCY: Status: ACTIVE | Noted: 2023-07-16

## 2023-07-17 PROBLEM — I16.1 HYPERTENSIVE EMERGENCY: Status: ACTIVE | Noted: 2023-07-16

## 2023-07-18 ENCOUNTER — LAB VISIT (OUTPATIENT)
Dept: LAB | Facility: HOSPITAL | Age: 58
End: 2023-07-18
Payer: COMMERCIAL

## 2023-07-18 DIAGNOSIS — Z76.82 ORGAN TRANSPLANT CANDIDATE: ICD-10-CM

## 2023-07-27 LAB — HPRA INTERPRETATION: NORMAL

## 2023-08-03 PROCEDURE — 99001 SPECIMEN HANDLING PT-LAB: CPT | Mod: PO,TXP | Performed by: NURSE PRACTITIONER

## 2023-08-08 ENCOUNTER — LAB VISIT (OUTPATIENT)
Dept: LAB | Facility: HOSPITAL | Age: 58
End: 2023-08-08
Payer: COMMERCIAL

## 2023-08-08 DIAGNOSIS — Z76.82 ORGAN TRANSPLANT CANDIDATE: ICD-10-CM

## 2023-09-12 PROCEDURE — 86833 HLA CLASS II HIGH DEFIN QUAL: CPT | Mod: PO,TXP | Performed by: NURSE PRACTITIONER

## 2023-09-12 PROCEDURE — 86832 HLA CLASS I HIGH DEFIN QUAL: CPT | Mod: PO,TXP | Performed by: NURSE PRACTITIONER

## 2023-09-20 ENCOUNTER — LAB VISIT (OUTPATIENT)
Dept: LAB | Facility: HOSPITAL | Age: 58
End: 2023-09-20
Payer: COMMERCIAL

## 2023-09-20 DIAGNOSIS — Z76.82 ORGAN TRANSPLANT CANDIDATE: ICD-10-CM

## 2023-09-26 ENCOUNTER — TELEPHONE (OUTPATIENT)
Dept: TRANSPLANT | Facility: CLINIC | Age: 58
End: 2023-09-26
Payer: COMMERCIAL

## 2023-09-26 NOTE — TELEPHONE ENCOUNTER
Transplant Nephrology Note;      I spoke with Dr. Adorno about Chidi being eligible for additional wait time. The physician was asked to send us lab results showing eGFR that span 20 predating the UNOS qualifying waitlist date, which was given to the physician.       I gave the fax # [phone] and e-mail [email or web address]  to send labs for us to review.      Gianfranco Philip MD

## 2023-10-03 LAB — HPRA INTERPRETATION: NORMAL

## 2023-10-16 PROBLEM — I21.4 NSTEMI (NON-ST ELEVATED MYOCARDIAL INFARCTION): Status: RESOLVED | Noted: 2023-07-16 | Resolved: 2023-10-16

## 2023-11-02 DIAGNOSIS — Z12.5 SCREENING PSA (PROSTATE SPECIFIC ANTIGEN): Primary | ICD-10-CM

## 2023-11-02 DIAGNOSIS — Z76.82 ORGAN TRANSPLANT CANDIDATE: ICD-10-CM

## 2023-11-03 ENCOUNTER — TELEPHONE (OUTPATIENT)
Dept: TRANSPLANT | Facility: CLINIC | Age: 58
End: 2023-11-03
Payer: COMMERCIAL

## 2023-11-03 NOTE — TELEPHONE ENCOUNTER
Spoke to pt confirming appts on 01/26/2024. Appt reminders were mailed on 11/03/2023 and pt is aware to bring care giver.

## 2023-11-21 DIAGNOSIS — Z76.82 PRE-KIDNEY TRANSPLANT, LISTED: Primary | ICD-10-CM

## 2024-01-22 ENCOUNTER — TELEPHONE (OUTPATIENT)
Dept: TRANSPLANT | Facility: CLINIC | Age: 59
End: 2024-01-22
Payer: COMMERCIAL

## 2024-01-22 NOTE — TELEPHONE ENCOUNTER
Spoke to pt confirming rescheduled appts on 03/08/2024. Appt reminders were mailed on 01/22/2024 and pt is aware to bring care giver.    ----- Message from Pamela Landin RN sent at 1/22/2024 10:03 AM CST -----  Regarding: FW: return call  Contact: Chidi    ----- Message -----  From: Griselda Flowers  Sent: 1/22/2024   9:56 AM CST  To: Kidney Waitlisted Coordinator  Subject: return call                                      Caller:Chidi        Returning call to: Renee        Caller can be reached @: [phone] (home)      Note: Pt calling to have appt on 02/09/2024, changed to 03/01/24 if possible. Requesting call back.

## 2024-01-22 NOTE — TELEPHONE ENCOUNTER
Spoke to pt confirming appts on 02/09/2024. Appt reminders were mailed on 01/22/2024 and pt is aware to bring care giver.

## 2024-02-09 ENCOUNTER — TELEPHONE (OUTPATIENT)
Dept: TRANSPLANT | Facility: CLINIC | Age: 59
End: 2024-02-09
Payer: COMMERCIAL

## 2024-02-29 ENCOUNTER — TELEPHONE (OUTPATIENT)
Dept: TRANSPLANT | Facility: CLINIC | Age: 59
End: 2024-02-29
Payer: COMMERCIAL

## 2024-02-29 NOTE — TELEPHONE ENCOUNTER
MA notes per Adherence form     FOR THE PAST THREE MONTHS:    0-AMA's  0-No-shows    No concerns with care giving, transportation, or mental health    Scanned in pt's media    Suyapa Bryant  Abdominal Transplant MA

## 2024-03-08 ENCOUNTER — HOSPITAL ENCOUNTER (OUTPATIENT)
Dept: RADIOLOGY | Facility: HOSPITAL | Age: 59
Discharge: HOME | End: 2024-03-08
Attending: NURSE PRACTITIONER
Payer: COMMERCIAL

## 2024-03-08 ENCOUNTER — OFFICE VISIT (OUTPATIENT)
Dept: TRANSPLANT | Facility: CLINIC | Age: 59
End: 2024-03-08
Payer: COMMERCIAL

## 2024-03-08 ENCOUNTER — HOSPITAL ENCOUNTER (OUTPATIENT)
Dept: CARDIOLOGY | Facility: HOSPITAL | Age: 59
Discharge: HOME | End: 2024-03-08
Attending: NURSE PRACTITIONER
Payer: COMMERCIAL

## 2024-03-08 VITALS
WEIGHT: 192.88 LBS | DIASTOLIC BLOOD PRESSURE: 65 MMHG | SYSTOLIC BLOOD PRESSURE: 151 MMHG | HEIGHT: 67 IN | TEMPERATURE: 97 F | BODY MASS INDEX: 30.27 KG/M2 | OXYGEN SATURATION: 100 % | HEART RATE: 65 BPM

## 2024-03-08 VITALS — BODY MASS INDEX: 30.53 KG/M2 | WEIGHT: 190 LBS | HEIGHT: 66 IN

## 2024-03-08 DIAGNOSIS — Z76.82 ORGAN TRANSPLANT CANDIDATE: ICD-10-CM

## 2024-03-08 DIAGNOSIS — Z79.01 LONG TERM (CURRENT) USE OF ANTICOAGULANTS: Chronic | ICD-10-CM

## 2024-03-08 DIAGNOSIS — I25.10 CORONARY ARTERY DISEASE INVOLVING NATIVE CORONARY ARTERY OF NATIVE HEART WITHOUT ANGINA PECTORIS: ICD-10-CM

## 2024-03-08 DIAGNOSIS — N18.6 ESRD ON PERITONEAL DIALYSIS: ICD-10-CM

## 2024-03-08 DIAGNOSIS — I12.0 BENIGN HYPERTENSION WITH ESRD (END-STAGE RENAL DISEASE): ICD-10-CM

## 2024-03-08 DIAGNOSIS — E11.21 CONTROLLED TYPE 2 DIABETES MELLITUS WITH DIABETIC NEPHROPATHY, WITHOUT LONG-TERM CURRENT USE OF INSULIN: Chronic | ICD-10-CM

## 2024-03-08 DIAGNOSIS — Z76.82 PATIENT ON WAITING LIST FOR KIDNEY TRANSPLANT: Primary | Chronic | ICD-10-CM

## 2024-03-08 DIAGNOSIS — Z99.2 ESRD ON PERITONEAL DIALYSIS: ICD-10-CM

## 2024-03-08 DIAGNOSIS — N18.6 BENIGN HYPERTENSION WITH ESRD (END-STAGE RENAL DISEASE): ICD-10-CM

## 2024-03-08 DIAGNOSIS — E66.09 CLASS 1 OBESITY DUE TO EXCESS CALORIES WITH SERIOUS COMORBIDITY AND BODY MASS INDEX (BMI) OF 30.0 TO 30.9 IN ADULT: ICD-10-CM

## 2024-03-08 PROBLEM — R06.02 SOB (SHORTNESS OF BREATH): Status: RESOLVED | Noted: 2023-07-16 | Resolved: 2024-03-08

## 2024-03-08 PROBLEM — E66.811 CLASS 1 OBESITY DUE TO EXCESS CALORIES WITH SERIOUS COMORBIDITY AND BODY MASS INDEX (BMI) OF 30.0 TO 30.9 IN ADULT: Status: ACTIVE | Noted: 2024-03-08

## 2024-03-08 PROBLEM — N18.5 CHRONIC KIDNEY DISEASE (CKD), STAGE V: Status: RESOLVED | Noted: 2023-02-27 | Resolved: 2024-03-08

## 2024-03-08 LAB
ASCENDING AORTA: 4.72 CM
AV INDEX (PROSTH): 1.08
AV MEAN GRADIENT: 1 MMHG
AV PEAK GRADIENT: 3 MMHG
AV VALVE AREA BY VELOCITY RATIO: 3.27 CM²
AV VALVE AREA: 3.74 CM²
AV VELOCITY RATIO: 0.94
BSA FOR ECHO PROCEDURE: 2 M2
CV ECHO LV RWT: 0.39 CM
DOP CALC AO PEAK VEL: 0.89 M/S
DOP CALC AO VTI: 18.55 CM
DOP CALC LVOT AREA: 3.5 CM2
DOP CALC LVOT DIAMETER: 2.1 CM
DOP CALC LVOT PEAK VEL: 0.84 M/S
DOP CALC LVOT STROKE VOLUME: 69.34 CM3
DOP CALCLVOT PEAK VEL VTI: 20.03 CM
E WAVE DECELERATION TIME: 378.09 MSEC
E/A RATIO: 1.12
E/E' RATIO: 27.45 M/S
ECHO LV POSTERIOR WALL: 1.2 CM (ref 0.6–1.1)
EJECTION FRACTION: 55 %
FRACTIONAL SHORTENING: 26 % (ref 28–44)
INTERVENTRICULAR SEPTUM: 1.3 CM (ref 0.6–1.1)
LA MAJOR: 6.33 CM
LA MINOR: 5.54 CM
LA WIDTH: 4.57 CM
LEFT ATRIUM SIZE: 4.66 CM
LEFT ATRIUM VOLUME INDEX MOD: 48.5 ML/M2
LEFT ATRIUM VOLUME INDEX: 54.6 ML/M2
LEFT ATRIUM VOLUME MOD: 95 CM3
LEFT ATRIUM VOLUME: 106.96 CM3
LEFT INTERNAL DIMENSION IN SYSTOLE: 4.5 CM (ref 2.1–4)
LEFT VENTRICLE DIASTOLIC VOLUME INDEX: 85.33 ML/M2
LEFT VENTRICLE DIASTOLIC VOLUME: 167.25 ML
LEFT VENTRICLE MASS INDEX: 174 G/M2
LEFT VENTRICLE SYSTOLIC VOLUME INDEX: 30.1 ML/M2
LEFT VENTRICLE SYSTOLIC VOLUME: 59.05 ML
LEFT VENTRICULAR INTERNAL DIMENSION IN DIASTOLE: 6.1 CM (ref 3.5–6)
LEFT VENTRICULAR MASS: 340.95 G
LV LATERAL E/E' RATIO: 30.2 M/S
LV SEPTAL E/E' RATIO: 25.17 M/S
MV PEAK A VEL: 1.35 M/S
MV PEAK E VEL: 1.51 M/S
MV STENOSIS PRESSURE HALF TIME: 109.65 MS
MV VALVE AREA P 1/2 METHOD: 2.01 CM2
PISA MRMAX VEL: 0.06 M/S
PISA TR MAX VEL: 2.61 M/S
RA MAJOR: 5.15 CM
RA PRESSURE ESTIMATED: 3 MMHG
RA WIDTH: 4.4 CM
RIGHT ATRIAL AREA: 18.5 CM2
RIGHT VENTRICULAR END-DIASTOLIC DIMENSION: 3.44 CM
RV TB RVSP: 6 MMHG
RV TISSUE DOPPLER FREE WALL SYSTOLIC VELOCITY 1 (APICAL 4 CHAMBER VIEW): 10.15 CM/S
SINUS: 3.63 CM
STJ: 3.16 CM
TDI LATERAL: 0.05 M/S
TDI SEPTAL: 0.06 M/S
TDI: 0.06 M/S
TR MAX PG: 27 MMHG
TRICUSPID ANNULAR PLANE SYSTOLIC EXCURSION: 2.23 CM
TV REST PULMONARY ARTERY PRESSURE: 30 MMHG
Z-SCORE OF LEFT VENTRICULAR DIMENSION IN END DIASTOLE: 0.84
Z-SCORE OF LEFT VENTRICULAR DIMENSION IN END SYSTOLE: 2.1

## 2024-03-08 PROCEDURE — 76770 US EXAM ABDO BACK WALL COMP: CPT | Mod: TC,TXP

## 2024-03-08 PROCEDURE — 3077F SYST BP >= 140 MM HG: CPT | Mod: CPTII,S$GLB,TXP, | Performed by: NURSE PRACTITIONER

## 2024-03-08 PROCEDURE — 93306 TTE W/DOPPLER COMPLETE: CPT | Mod: TXP

## 2024-03-08 PROCEDURE — 76770 US EXAM ABDO BACK WALL COMP: CPT | Mod: 26,TXP,, | Performed by: STUDENT IN AN ORGANIZED HEALTH CARE EDUCATION/TRAINING PROGRAM

## 2024-03-08 PROCEDURE — 71046 X-RAY EXAM CHEST 2 VIEWS: CPT | Mod: 26,TXP,, | Performed by: RADIOLOGY

## 2024-03-08 PROCEDURE — 71046 X-RAY EXAM CHEST 2 VIEWS: CPT | Mod: TC,TXP

## 2024-03-08 PROCEDURE — 4010F ACE/ARB THERAPY RXD/TAKEN: CPT | Mod: CPTII,S$GLB,TXP, | Performed by: NURSE PRACTITIONER

## 2024-03-08 PROCEDURE — 93306 TTE W/DOPPLER COMPLETE: CPT | Mod: 26,TXP,, | Performed by: INTERNAL MEDICINE

## 2024-03-08 PROCEDURE — 72170 X-RAY EXAM OF PELVIS: CPT | Mod: TC,TXP

## 2024-03-08 PROCEDURE — 1159F MED LIST DOCD IN RCRD: CPT | Mod: CPTII,S$GLB,TXP, | Performed by: NURSE PRACTITIONER

## 2024-03-08 PROCEDURE — 99999 PR PBB SHADOW E&M-EST. PATIENT-LVL IV: CPT | Mod: PBBFAC,TXP,, | Performed by: NURSE PRACTITIONER

## 2024-03-08 PROCEDURE — 99215 OFFICE O/P EST HI 40 MIN: CPT | Mod: S$GLB,TXP,, | Performed by: NURSE PRACTITIONER

## 2024-03-08 PROCEDURE — 3008F BODY MASS INDEX DOCD: CPT | Mod: CPTII,S$GLB,TXP, | Performed by: NURSE PRACTITIONER

## 2024-03-08 PROCEDURE — 72170 X-RAY EXAM OF PELVIS: CPT | Mod: 26,TXP,, | Performed by: RADIOLOGY

## 2024-03-08 PROCEDURE — 3078F DIAST BP <80 MM HG: CPT | Mod: CPTII,S$GLB,TXP, | Performed by: NURSE PRACTITIONER

## 2024-03-08 NOTE — LETTER
March 11, 2024        Kamran Adorno  [address]  [address]  [place] [address]  Phone: [phone]  Fax: [phone]             [facility]- Transplant 1st Fl  [address]  [facility] [address]  Phone: [phone]   Patient: Chidi Lisa   MR Number: [id number]   YOB: 1965   Date of Visit: 3/8/2024       Dear Dr. Kamran Adorno    Thank you for referring Chidi Lisa to me for evaluation. Attached you will find relevant portions of my assessment and plan of care.    If you have questions, please do not hesitate to call me. I look forward to following Chidi Lisa along with you.    Sincerely,    Rimma Owens, NP    Enclosure    If you would like to receive this communication electronically, please contact [email or web address] or [phone] to request [employer] Link access.    [employer] Link is a tool which provides read-only access to select patient information with whom you have a relationship. Its easy to use and provides real time access to review your patients record including encounter summaries, notes, results, and demographic information.    If you feel you have received this communication in error or would no longer like to receive these types of communications, please e-mail [email or web address]

## 2024-03-08 NOTE — PROGRESS NOTES
AA YEARLY PATIENT EDUCATION NOTE    Mr. Chidi Lisa was seen in pre-kidney transplant clinic for yearly (or six months) evaluation for kidney, kidney/pancreas or pancreas only transplant.  The patient attended a web based education session that discussed/reviewed the following aspects of transplantation: UNOS waitlist management/multiple listings, types of organs offered (KDPI < 85%, KDPI > 85%, PHS increased risk, DCD, HCV+), financial aspects, surgical procedures, dietary instruction pre- and post-transplant, health maintenance pre- and post-transplant, post-transplant hospitalization and outpatient follow-up, potential to participate in a research protocol, and medication management and side effects. A question and answer session was provided after the presentation.    The patient was seen by all members of the multi-disciplinary team to include: Nephrologist/PA, , , Pharmacist and Dietician (if applicable).    The Patient was educated on OPTN policy change regarding race based eGFR. For Black or  Americans, this eGFR could have shown that their kidneys were working better than they were.    Because of this change, we are looking at everyones record and assessing waiting time for people who are eligible. We will be reviewing your medical records and will notify you if you are eligible. We also encouraged patient to provide span 20 labs that are not in our electronic medical records.    The patient reviewed and signed all consents for evaluation which were witnessed and sent to scanning into the [facility] chart.    The patient was given an education book and plan for further evaluation based on his individual assessment.      The patient was encouraged to call with any questions or concerns.

## 2024-03-08 NOTE — PROGRESS NOTES
Kidney Transplant Recipient Reevalulation    Referring Physician: Kamran Adorno  Current Nephrologist: Kamran Adorno  Waitlist Status: active  Dialysis Start Date: 1/17/2020    Subjective:     CC:  Annual reassessment of kidney transplant candidacy.    HPI:  Mr. Lisa is a 58 y.o. year old Black or  male with ESRD secondary to diabetic nephropathy.  He has been on the wait list for a kidney transplant at [facility] since 1/17/2020. History of CVA in 2016 suspected related to atrial fibrillation, remains on eliquis. Patient  started dialysis on 1/17/20 , was on  hemodialysis since 1/17/2020, changed to PD and switched back to HD again ~ 5/2023.  He is dialyzing TTS for 3 hours and 45 minutes per session.  Patient reports that he is tolerating dialysis well. . He has a LUE AV fistula .       9/26/2022 underwent LAMINECTOMY, SPINE, LUMBAR, WITH DISCECTOMY, HEMILAMINECTOMY, RIGHT L4-5 (Right), FACETECTOMY, PARTIAL MEDIAL (Right), FORAMINOTOMY, SPINE (Right), EXCISION, MASS, DISC EXTRUSION (Right).  Hosp/ED visits:   ED 7/16/23 hypovolemia   ED 11/13/23 ED SOB    LOV 1/12/23  FX assessment:UPDATE     functional status is at baseline, he cont to stretch every morning, and work at a large warehouse.  While at work he goes up and down stairs all day. He is no longer using a rolling walker or any assistive devices.  Looks great, not frail.     Lab /diagnostic results /TXP [facility] reviewed      2/27/23 LHC:  Mild coronary calcification and luminal irregularities. Recommendations   Continue outpatient workup for kidney transplant.     4/8/24 TTE:    Left Ventricle: The left ventricle is mildly dilated. Increased ventricular mass. Increased wall thickness. There is eccentric hypertrophy. See diagram for wall motion findings. There is normal systolic function. Ejection fraction by visual approximation is 55%. Grade II diastolic dysfunction.    Right Ventricle: Normal right ventricular cavity size. Right  ventricle wall motion  is normal. Systolic function is normal.    Left Atrium: Left atrium is severely dilated.    Right Atrium: Right atrium is dilated.    Aortic Valve: The aortic valve is likely a bicuspid valve with fusion of the right and left coronary cusps. There is moderate aortic valve sclerosis. There is annular calcification present. Mildly restricted motion without significant stenosis. There is mild to moderate aortic regurgitation.    Mitral Valve: Moderately thickened leaflets. Mildly restricted motion of the posterior leaflet. There is mild regurgitation.    Tricuspid Valve: There is mild regurgitation.    Aorta: Ascending aorta is moderately dilated measuring 4.72 cm.    Pulmonary Artery: The estimated pulmonary artery systolic pressure is 30 mmHg.    IVC/SVC: Normal venous pressure at 3 mmHg.    CXR  3/8/24 (-)  PXR  3/8/24 . Vascular calcifications the internal iliac and femoral vessels   Renal US  4/8/24  Chronic medical renal disease  Iliacs 10/1/2019: favorable for transplant.   Stress 10/26/2021: no evidence of myocardial ischemia or infarction.   Colonoscopy 1/16/2020: normal colon, repeat in 10 years     PSA, Screen (ng/mL)   Date Value   03/08/2024 0.92         Past Medical History:   Diagnosis Date    Anticoagulant long-term use     Arthritis     Carotid artery disease     Diabetes mellitus     Dialysis patient     [facility] FRESENIUS- L UPPER ARM AV SHUNT    Gout, unspecified     Hypertension     Renal disorder     Stroke 2016    TIA    TIA (transient ischemic attack) 10/04/2019       Review of Systems   Constitutional:  Negative for activity change, appetite change and fever.   HENT:  Negative for congestion, mouth sores and sore throat.    Eyes:  Positive for visual disturbance.        Wears glasses   Respiratory:  Negative for cough, chest tightness and shortness of breath.    Cardiovascular:  Negative for chest pain, palpitations (afib) and leg swelling.  "  Gastrointestinal:  Negative for abdominal distention, abdominal pain, constipation, diarrhea and nausea.   Genitourinary:  Negative for difficulty urinating, frequency and hematuria.   Musculoskeletal:  Positive for back pain. Negative for arthralgias and gait problem.   Skin:  Negative for wound.   Allergic/Immunologic: Negative for environmental allergies, food allergies and immunocompromised state.   Neurological:  Negative for dizziness, weakness and numbness.   Hematological:  Bruises/bleeds easily.        On eliquis    Psychiatric/Behavioral:  Negative for sleep disturbance. The patient is not nervous/anxious.        Objective:   body mass index is 30.67 kg/m².  BP (!) 151/65 (BP Location: Right arm, Patient Position: Sitting, BP Method: Medium (Manual))   Pulse 65   Temp 97.3 °F (36.3 °C) (Temporal)   Ht 5' 6.5" (1.689 m)   Wt 87.5 kg (192 lb 14.4 oz)   SpO2 100%   BMI 30.67 kg/m²     Physical Exam  Vitals and nursing note reviewed.   Constitutional:       Appearance: Normal appearance.   HENT:      Head: Normocephalic.   Cardiovascular:      Rate and Rhythm: Normal rate and regular rhythm.      Heart sounds: Normal heart sounds.   Pulmonary:      Effort: Pulmonary effort is normal.      Breath sounds: Normal breath sounds.   Abdominal:      General: Bowel sounds are normal. There is no distension.      Palpations: Abdomen is soft.      Tenderness: There is no abdominal tenderness.      Comments:     Musculoskeletal:         General: Normal range of motion.        Arms:       Comments: LUE AV fistula + thrill    Skin:     General: Skin is warm and dry.   Neurological:      General: No focal deficit present.      Mental Status: He is alert.   Psychiatric:         Behavior: Behavior normal.         Labs:  PSA, Screen (ng/mL)   Date Value   03/08/2024 0.92     Lab Results   Component Value Date    WBC 5.30 07/17/2023    HGB 10.1 (L) 07/17/2023    HCT 30.9 (L) 07/17/2023     (L) 07/17/2023    K 4.1 " 07/17/2023    CL 94 (L) 07/17/2023    CO2 30 (H) 07/17/2023    BUN 31 (H) 07/17/2023    CREATININE 6.96 (H) 07/17/2023    EGFRNONAA 8 (A) 01/09/2020    CALCIUM 9.4 07/17/2023    PHOS 7.03 (H) 12/22/2022    MG 2.2 07/16/2023    ALBUMIN 3.9 07/17/2023    AST 28 07/17/2023    ALT 16 07/17/2023    UTPCR 3.90 (H) 10/01/2019    .0 (H) 03/08/2024       Lab Results   Component Value Date    BILIRUBINUA Negative 10/01/2019    PROTEINUA 2+ (A) 10/01/2019    NITRITE Negative 10/01/2019    RBCUA 5 (H) 10/01/2019    WBCUA 4 10/01/2019       Lab Results   Component Value Date    CPRA 2 09/12/2023    GL6UYJK B37 09/12/2023    CIABCLM WEAK-- B37 07/11/2023    CIIAB DP1 03/16/2022    ABCMT NONE 09/12/2023       Labs were reviewed with the patient.    Pre-transplant Workup:   Reviewed with the patient.    Assessment:     1. Patient on waiting list for kidney transplant    2. ESRD on peritoneal dialysis    3. Controlled type 2 diabetes mellitus with diabetic nephropathy, without long-term current use of insulin    4. Benign hypertension with ESRD (end-stage renal disease)    5. Coronary artery disease involving native coronary artery of native heart without angina pectoris    6. Long term (current) use of anticoagulants--Eliquis    7. Class 1 obesity due to excess calories with serious comorbidity and body mass index (BMI) of 30.0 to 30.9 in adult        Plan:     A2B consent signed in clinic       Transplant Candidacy:   Mr. Lisa is a suitable kidney transplant candidate    Meets center eligibility for accepting HCV+ donor offer - yes.  Patient educated on HCV+ donors. Chidi is willing  to accept HCV+ donor offer -  yes   Patient is a candidate for KDPI > 85 kidney donor offer - no (weight>88kg)  He remains in overall stable health, and will remain active on the transplant list.    Rimma Owens NP       Follow-up:   In addition to the tests noted in the plan, Mr. Lisa will continue to have reevaluation as per the  standing pre-kidney transplant protocol:  Monthly blood for PRA  Annual return to clinic, except HIV positive, > 65 years of age, or pancreas transplant candidates who will be scheduled to see transplant every 6 months while in pre-transplant phase  Annual re-testing: CXR, EKG, yearly mammograms for women over 40 and PSA for males over 40, cardiology follow-up as recommended by initial cardiology pre-transplant evaluation  Renal ultrasound every 2 years  Baseline colonoscopy after age 50 and repeated as recommended    UNOS Patient Status  Functional Status: 60% - Requires occasional assistance but is able to care for needs  Physical Capacity: No Limitations

## 2024-03-11 DIAGNOSIS — Z76.82 PRE-KIDNEY TRANSPLANT, LISTED: Primary | ICD-10-CM

## 2024-03-11 DIAGNOSIS — Z76.82 ORGAN TRANSPLANT CANDIDATE: Primary | ICD-10-CM

## 2024-03-12 DIAGNOSIS — Z76.82 ORGAN TRANSPLANT CANDIDATE: Primary | ICD-10-CM

## 2024-03-14 ENCOUNTER — EPISODE CHANGES (OUTPATIENT)
Dept: TRANSPLANT | Facility: HOSPITAL | Age: 59
End: 2024-03-14

## 2024-03-18 NOTE — PROGRESS NOTES
Transplant Psychosocial Evaluation Update:  Last psychosocial evaluation for transplant was completed on 1/12/23 by TERRY Talavera LCSW.     Pt presents today with wife, Ashley Lisa. Pt and wife present as alert, oriented to person, place, time, purpose of visit. Pt and wife deny concerns about going through with transplant and state motivation and sense of preparedness for transplantation, caregiver role, psychosocial risk factors, medical risk factors, financial aspects, and lifetime commitments. All concerns and education points as per transplant psychosocial evaluation process addressed (also refer to psychosocial dated 1/12/23). Pt actively participated in today's interview. Pt asking questions appropriately and denies changes to previous psychosocial evaluation for transplantation which we reviewed line by line with pt.     Primary Caregivers and Transportation for Transplant:  1. Ashley Lisa, 58 y/o wife, [phone], drives.  2. Tatiana Morocho, 34 y/o stepdaughter, [phone], drives.  3. Fidel Millan, 46 y/o friend, [phone], drives.  4. Delicia Morocho, 39 y/o stepdaughter, [phone], drives.     Both pt and caregiver/s plan to stay locally at either return to home in [place] or at lodging arranged by themselves.  Pt and caregiver informed that lodging assistance will be unavailable beginning 2023.  - information reviewed in depth.  Caregivers plan to stay at hospital as appropriate for transplant and post-transplant process.     Pts Vocation: Pt works at [employer] as a .  Last day worked:  3/9/2024.     DME: BP cuff, diabetic supplies and equipment, PD equipment and supplies.      ADLS:  Pt states ability to independently accomplish all adls.      Household and Dependents: pt resides with wife and their dog and has no dependents at this time.     Income: Pt states ability to afford all monthly expenses and costs including for medications now and if transplanted based on  income and on savings and assets.     Dialysis Adherence: Pt states current and expected compliance with all healthcare recommendations including dialysis. DU reports 0 AMA's and 0 no-shows during previous three onths (see 2024 note by TERRY Rhodes MA).     Pt and wife deny any additional concerns, stating preparedness and motivation to proceed with organ transplant.      Suitability for Transplant:   Pt remains low risk for transplant at this time based on psychosocial risk factors and strengths. Pt and caregiver present without acute mental health concerns.      Pt aleta well overall with health needs and life in general, denies current or past suicidal/homicidal ideation, has stable supports, adequate insurance, financial stability, transportation and caregiver plans in place, and is using no substances unless prescribed.     Payer/Plan Subscr  Sex Relation Sub. Ins. ID Effective Group Num   1. MEDICARE - ME* YARELY EARL* 1965 Male Self [id number]                                    [address]   2. BLUE CROSS * YARELY EARL* 1965 Male Self [id number]                                   [address] [address]     Renato Wesley LCSW

## 2024-04-10 ENCOUNTER — TELEPHONE (OUTPATIENT)
Dept: TRANSPLANT | Facility: CLINIC | Age: 59
End: 2024-04-10
Payer: COMMERCIAL

## 2024-04-10 NOTE — TELEPHONE ENCOUNTER
ON-CALL NOTE    UNOS# [id number]    Notified by Brigido Montalvo, , that Chidi Lisa is eligible for kidney offer.  Spoke with patient and identified no acute medical issues with telephone assessment. Protocol script read to patient regarding HCV+ donor offer. Patient verbalized understanding, all questions answered, patient accepts organ offer. Notified by Brigido Montalvo that virtual crossmatch is negative.  Current sample of blood is available from date 3/8/2024 for crossmatch.  Patient reports no sensitizing event since last blood sample for PRA received. Notified CRISTHIAN in HLA Lab to perform a prospective  crossmatch.  After further discussion, patient informed me that he is currently in [place], [place] visiting his mother. He would have to catch a flight  to be considered for offer. Since offer is marginal, Dr. Jadon Valdez was called regarding the offer and the patient being out of town. He did not think that patient should catch a flight for the offer. I explained to patient and he was removed from consideration. He verbalize understanding.

## 2024-04-19 ENCOUNTER — TELEPHONE (OUTPATIENT)
Dept: TRANSPLANT | Facility: CLINIC | Age: 59
End: 2024-04-19
Payer: COMMERCIAL

## 2024-04-19 NOTE — TELEPHONE ENCOUNTER
----- Message from Edita Rodriguez sent at 4/18/2024  3:32 PM CDT -----  Regarding: Patient advice  Contact: Pt  [phone]            Name of Caller:  Juno Aranda Preference: [phone]    Nature of Call:  Requesting a call back to touch bases and get status

## 2024-04-19 NOTE — TELEPHONE ENCOUNTER
ON-CALL NOTE    UNOS# [id number]    Notified by Luis Morel, , that Chidi Lisa is eligible for kidney offer.  Spoke with patient and identified no acute medical issues with telephone assessment. Protocol script read to patient regarding N/A, standard donor offer. Patient verbalized understanding, all questions answered, patient accepts organ offer. Notified by Luis Morel that virtual crossmatch is negative. Current sample of blood is available from date March 8, 2024 for crossmatch.  Patient reports no sensitizing event since last blood sample for PRA received. Notified Alex in HLA Lab to perform a retrospective  crossmatch per guideline.    Patient was asked if they have had a positive COVID-19 test or if they have any signs or symptoms. Informed patient that they will be tested for COVID-19 upon arrival to the hospital, unless have a previous positive result. If tested and result is positive, the transplant will not be able to occur, they will be inactivated on the wait list for 21 days per protocol and required to quarantine.     Patient notified to continue normal routine and remain at home on standby as backup and await updates. Verbalized understanding.    4/18/2024 @ 2043 pm- Phoned patient and informed no new updates. All questions answered to patient's satisfaction.    4/18/2024 @ 0800 am- Report given to Pamela ZHENG RN,

## 2024-05-31 ENCOUNTER — TELEPHONE (OUTPATIENT)
Dept: TRANSPLANT | Facility: CLINIC | Age: 59
End: 2024-05-31
Payer: COMMERCIAL

## 2024-06-21 ENCOUNTER — LAB VISIT (OUTPATIENT)
Dept: LAB | Facility: HOSPITAL | Age: 59
End: 2024-06-21
Payer: COMMERCIAL

## 2024-06-21 DIAGNOSIS — Z76.82 ORGAN TRANSPLANT CANDIDATE: ICD-10-CM

## 2024-06-21 LAB — BLD GP AB SCN TITR SERPL: 2 {TITER}

## 2024-06-21 PROCEDURE — 86886 COOMBS TEST INDIRECT TITER: CPT | Mod: TXP | Performed by: NURSE PRACTITIONER

## 2024-06-21 PROCEDURE — 36415 COLL VENOUS BLD VENIPUNCTURE: CPT | Mod: TXP | Performed by: NURSE PRACTITIONER

## 2024-07-02 PROCEDURE — 86832 HLA CLASS I HIGH DEFIN QUAL: CPT | Mod: TXP | Performed by: NURSE PRACTITIONER

## 2024-07-02 PROCEDURE — 86833 HLA CLASS II HIGH DEFIN QUAL: CPT | Mod: TXP | Performed by: NURSE PRACTITIONER

## 2024-07-04 ENCOUNTER — TELEPHONE (OUTPATIENT)
Dept: TRANSPLANT | Facility: CLINIC | Age: 59
End: 2024-07-04
Payer: COMMERCIAL

## 2024-07-04 NOTE — TELEPHONE ENCOUNTER
ON-CALL NOTE    UNOS# [id number]    Notified by Heri Landa, , that Chidi Lisa is eligible for kidney offer.  Spoke with patient and identified no acute medical issues with telephone assessment. Protocol script read to patient regarding PHS risk criteria donor offer . Patient verbalized understanding, all questions answered, patient accepts organ offer. Notified by Heri Landa that virtual crossmatch is negative.  Current sample of blood is available from date 3/8 AND WILL NEED FRESH SAMPLE UPON ADMIT for crossmatch.  Patient reports no sensitizing event since last blood sample for PRA received. Notified Fidel in HLA Lab to perform a retrospective  crossmatch per guideline.    Patient was asked if they have had a positive COVID-19 test or if they have any signs or symptoms. Informed patient that they will be tested for COVID-19 upon arrival to the hospital, unless have a previous positive result. If tested and result is positive, the transplant will not be able to occur, they will be inactivated on the wait list for 21 days per protocol and required to quarantine.     Patient notified of plan to remain on standby and states understanding.     Of note: pt had a tooth infection will complete his PO antibiotics tomorrow. Dr. Zuluaga aware and stated is suitable for txp.

## 2024-07-06 ENCOUNTER — LAB VISIT (OUTPATIENT)
Dept: LAB | Facility: HOSPITAL | Age: 59
End: 2024-07-06
Payer: COMMERCIAL

## 2024-07-06 DIAGNOSIS — Z76.82 PRE-KIDNEY TRANSPLANT, LISTED: ICD-10-CM

## 2024-07-16 LAB — HPRA INTERPRETATION: NORMAL

## 2024-08-03 PROCEDURE — 99001 SPECIMEN HANDLING PT-LAB: CPT | Mod: TXP | Performed by: NURSE PRACTITIONER

## 2024-08-06 ENCOUNTER — LAB VISIT (OUTPATIENT)
Dept: LAB | Facility: HOSPITAL | Age: 59
End: 2024-08-06
Payer: COMMERCIAL

## 2024-08-06 DIAGNOSIS — Z76.82 PRE-KIDNEY TRANSPLANT, LISTED: ICD-10-CM

## 2024-08-30 ENCOUNTER — TELEPHONE (OUTPATIENT)
Dept: TRANSPLANT | Facility: CLINIC | Age: 59
End: 2024-08-30
Payer: COMMERCIAL

## 2024-08-30 DIAGNOSIS — N18.6 END STAGE RENAL DISEASE: ICD-10-CM

## 2024-08-30 DIAGNOSIS — Z76.82 ORGAN TRANSPLANT CANDIDATE: Primary | ICD-10-CM

## 2024-08-30 NOTE — TELEPHONE ENCOUNTER
ON-CALL NOTE     UNOS# [id number]    Notified by Heri Aj, , that Chidi Lisa is eligible for kidney offer.  Spoke with patient and identified no acute medical issues with telephone assessment. Protocol script read to patient regarding N/A, standard donor offer. Patient verbalized understanding, all questions answered, patient accepts organ offer. Notified by Heri Aj that virtual crossmatch is negative.  Current sample of blood is available from date 8/3/24 for crossmatch.  Patient reports no sensitizing event since last blood sample for PRA received. Will notify tech in HLA Lab to perform a retrospective  crossmatch per guideline if patient admitted for transplant.    Patient was asked if they have had a positive COVID-19 test or if they have any signs or symptoms. Informed patient that they will be tested for COVID-19 upon arrival to the hospital, unless have a previous positive result. If tested and result is positive, the transplant will not be able to occur, they will be inactivated on the wait list for 21 days per protocol and required to quarantine.     Patient notified of plan to standby at home as primary and await further instructions; pt states understanding. Pt taking eliquis daily for hx CVA, last dose 8/30 AM, instructed to hold. Dialysis unit will be notified if patient admitted.    8/31 @ 0700 - case passed to De NOVA

## 2024-08-31 ENCOUNTER — TELEPHONE (OUTPATIENT)
Dept: TRANSPLANT | Facility: CLINIC | Age: 59
End: 2024-08-31
Payer: COMMERCIAL

## 2024-08-31 NOTE — TELEPHONE ENCOUNTER
ON-CALL NOTE    UNOS# [id number]    8/31/2024 @ 7:00am, Care assume report received from MARIA FERNANDA Mays RN.  14:20pm Patient updated awaiting plan.  16:00pn Notified by LEO Aj Pocurement Coordinator that case shut down, poor organ   Quality. Patient updated and released.

## 2024-09-06 ENCOUNTER — TELEPHONE (OUTPATIENT)
Dept: TRANSPLANT | Facility: CLINIC | Age: 59
End: 2024-09-06
Payer: COMMERCIAL

## 2024-09-06 NOTE — TELEPHONE ENCOUNTER
----- Message from Usha Hanna sent at 9/6/2024  8:52 AM CDT -----  Regarding: Pt Advice  Contact: YARELY EARL [[id number]]  CONSULT/ADVISORY    Name of Caller:  YARELY EARL [[id number]]    Contact Preference:  [phone] (home)       Nature of Call:  Pt has questions about the transplant process and when the transplant is performed housing accommodations for his family.

## 2024-09-09 ENCOUNTER — TELEPHONE (OUTPATIENT)
Dept: TRANSPLANT | Facility: CLINIC | Age: 59
End: 2024-09-09
Payer: COMMERCIAL

## 2024-09-09 DIAGNOSIS — N18.6 END STAGE RENAL DISEASE: ICD-10-CM

## 2024-09-09 DIAGNOSIS — Z76.82 ORGAN TRANSPLANT CANDIDATE: Primary | ICD-10-CM

## 2024-10-07 ENCOUNTER — LAB VISIT (OUTPATIENT)
Dept: LAB | Facility: HOSPITAL | Age: 59
End: 2024-10-07
Payer: COMMERCIAL

## 2024-10-07 DIAGNOSIS — Z76.82 ORGAN TRANSPLANT CANDIDATE: ICD-10-CM

## 2024-10-07 LAB — BLD GP AB SCN TITR SERPL: 2 {TITER}

## 2024-10-07 PROCEDURE — 86886 COOMBS TEST INDIRECT TITER: CPT | Mod: TXP | Performed by: NURSE PRACTITIONER

## 2024-10-07 PROCEDURE — 36415 COLL VENOUS BLD VENIPUNCTURE: CPT | Mod: TXP | Performed by: NURSE PRACTITIONER

## 2024-10-09 DIAGNOSIS — Z76.82 ORGAN TRANSPLANT CANDIDATE: ICD-10-CM

## 2024-10-09 DIAGNOSIS — N18.6 END STAGE RENAL DISEASE: Primary | ICD-10-CM

## 2024-10-15 ENCOUNTER — LAB VISIT (OUTPATIENT)
Dept: LAB | Facility: HOSPITAL | Age: 59
End: 2024-10-15
Payer: COMMERCIAL

## 2024-10-15 ENCOUNTER — TELEPHONE (OUTPATIENT)
Dept: TRANSPLANT | Facility: CLINIC | Age: 59
End: 2024-10-15
Payer: COMMERCIAL

## 2024-10-15 DIAGNOSIS — N18.6 ESRD (END STAGE RENAL DISEASE): Primary | ICD-10-CM

## 2024-10-15 DIAGNOSIS — Z76.82 ORGAN TRANSPLANT CANDIDATE: ICD-10-CM

## 2024-10-15 DIAGNOSIS — Z76.82 AWAITING ORGAN TRANSPLANT STATUS: Primary | ICD-10-CM

## 2024-10-15 PROBLEM — D63.8 ANEMIA OF CHRONIC DISEASE: Status: ACTIVE | Noted: 2024-10-15

## 2024-10-15 LAB
HLA VIRTUAL CROSSMATCH INTERPRETATION: NORMAL
HLVXM TESTING DATE: NORMAL

## 2024-10-15 NOTE — TELEPHONE ENCOUNTER
ON-CALL NOTE    UNOS# [id number]    Notified by Luis Morel, , that Chidi Lisa is eligible for kidney offer.  Spoke with patient and identified no acute medical issues with telephone assessment. Protocol script read to patient regarding N/A, standard donor offer. Patient verbalized understanding, all questions answered, patient accepts organ offer. Notified by Luis Morel that virtual crossmatch is  not done due to outdated blood sample .  Patient will need to come to [facility] to provide fresh sample of blood for crossmatch.  Patient reports no sensitizing event since last blood sample for PRA received. Notified Courtney in HLA Lab to perform a prospective  crossmatch per guideline.    Patient notified of plan to  come to 2nd floor lab for blood draw and call me when collected.Patient verbalized  understanding.  I will notify HLA dept when lab is available for pick-up. Patient will remain at home on standby awaiting updates after labs collected. Donor OR is not set at this time. Patient instructed to call with any questions. My phone number provided to patient.    18:12 Gabriel called with admit instructions per Dr. Diane. If prospective crossmatch is negative, patient is to be admitted tomorrow morning for transplant with OR time to follow a 1700 case on 10/16/2024. NPO at 10:00 on 10/16/2024    1945 Gabriel notified me that patient's crossmatch result was negative.  2020 I called patient and his wife with admit instructions. Patient will be admitted to [facility] at 8-9 am on 10/16/2024. NPO at 10:00 a.m.  Patient and his wife verbalized understanding of all instructions given and will call with any questions. I called [facility], house supervisor Juno notified and Report call to Lianna Mojica.

## 2024-10-16 ENCOUNTER — ANESTHESIA EVENT (OUTPATIENT)
Dept: SURGERY | Facility: HOSPITAL | Age: 59
End: 2024-10-16
Payer: COMMERCIAL

## 2024-10-16 ENCOUNTER — HOSPITAL ENCOUNTER (INPATIENT)
Facility: HOSPITAL | Age: 59
LOS: 3 days | Discharge: HOME | DRG: 652 | End: 2024-10-19
Attending: TRANSPLANT SURGERY | Admitting: TRANSPLANT SURGERY
Payer: COMMERCIAL

## 2024-10-16 ENCOUNTER — TELEPHONE (OUTPATIENT)
Dept: TRANSPLANT | Facility: CLINIC | Age: 59
End: 2024-10-16
Payer: COMMERCIAL

## 2024-10-16 ENCOUNTER — ANESTHESIA (OUTPATIENT)
Dept: SURGERY | Facility: HOSPITAL | Age: 59
End: 2024-10-16
Payer: COMMERCIAL

## 2024-10-16 DIAGNOSIS — N18.6 ESRD (END STAGE RENAL DISEASE): ICD-10-CM

## 2024-10-16 DIAGNOSIS — E66.811 CLASS 1 OBESITY DUE TO EXCESS CALORIES WITH SERIOUS COMORBIDITY AND BODY MASS INDEX (BMI) OF 30.0 TO 30.9 IN ADULT: ICD-10-CM

## 2024-10-16 DIAGNOSIS — I12.0 BENIGN HYPERTENSION WITH ESRD (END-STAGE RENAL DISEASE): ICD-10-CM

## 2024-10-16 DIAGNOSIS — Z01.818 PRE-OP EVALUATION: ICD-10-CM

## 2024-10-16 DIAGNOSIS — I16.1 HYPERTENSIVE EMERGENCY: ICD-10-CM

## 2024-10-16 DIAGNOSIS — Z94.0 STATUS POST DECEASED-DONOR KIDNEY TRANSPLANTATION: ICD-10-CM

## 2024-10-16 DIAGNOSIS — Z29.89 PROPHYLACTIC IMMUNOTHERAPY: ICD-10-CM

## 2024-10-16 DIAGNOSIS — Z91.89 AT RISK FOR OPPORTUNISTIC INFECTIONS: ICD-10-CM

## 2024-10-16 DIAGNOSIS — N18.6 BENIGN HYPERTENSION WITH ESRD (END-STAGE RENAL DISEASE): ICD-10-CM

## 2024-10-16 DIAGNOSIS — Z94.0 STATUS POST KIDNEY TRANSPLANT: Primary | ICD-10-CM

## 2024-10-16 DIAGNOSIS — E11.21 CONTROLLED TYPE 2 DIABETES MELLITUS WITH DIABETIC NEPHROPATHY, WITHOUT LONG-TERM CURRENT USE OF INSULIN: Chronic | ICD-10-CM

## 2024-10-16 DIAGNOSIS — I15.0 RENOVASCULAR HYPERTENSION: ICD-10-CM

## 2024-10-16 DIAGNOSIS — Z79.60 LONG-TERM USE OF IMMUNOSUPPRESSANT MEDICATION: ICD-10-CM

## 2024-10-16 DIAGNOSIS — E66.09 CLASS 1 OBESITY DUE TO EXCESS CALORIES WITH SERIOUS COMORBIDITY AND BODY MASS INDEX (BMI) OF 30.0 TO 30.9 IN ADULT: ICD-10-CM

## 2024-10-16 DIAGNOSIS — Z79.01 LONG TERM (CURRENT) USE OF ANTICOAGULANTS: Chronic | ICD-10-CM

## 2024-10-16 DIAGNOSIS — D63.8 ANEMIA OF CHRONIC DISEASE: ICD-10-CM

## 2024-10-16 LAB
ABO + RH BLD: NORMAL
ALBUMIN SERPL BCP-MCNC: 3.8 G/DL (ref 3.5–5.2)
ALP SERPL-CCNC: 80 U/L (ref 55–135)
ALT SERPL W/O P-5'-P-CCNC: 9 U/L (ref 10–44)
ANION GAP SERPL CALC-SCNC: 12 MMOL/L (ref 8–16)
APTT PPP: 29 SEC (ref 21–32)
AST SERPL-CCNC: 18 U/L (ref 10–40)
BASOPHILS # BLD AUTO: 0.01 K/UL (ref 0–0.2)
BASOPHILS NFR BLD: 0.3 % (ref 0–1.9)
BILIRUB SERPL-MCNC: 0.6 MG/DL (ref 0.1–1)
BLD GP AB SCN CELLS X3 SERPL QL: NORMAL
BUN SERPL-MCNC: 40 MG/DL (ref 6–20)
CALCIUM SERPL-MCNC: 9.5 MG/DL (ref 8.7–10.5)
CHLORIDE SERPL-SCNC: 98 MMOL/L (ref 95–110)
CO2 SERPL-SCNC: 28 MMOL/L (ref 23–29)
CREAT SERPL-MCNC: 9.6 MG/DL (ref 0.5–1.4)
CREAT UR-MCNC: 65 MG/DL (ref 23–375)
DIFFERENTIAL METHOD BLD: ABNORMAL
EOSINOPHIL # BLD AUTO: 0.1 K/UL (ref 0–0.5)
EOSINOPHIL NFR BLD: 1.8 % (ref 0–8)
ERYTHROCYTE [DISTWIDTH] IN BLOOD BY AUTOMATED COUNT: 16.7 % (ref 11.5–14.5)
EST. GFR  (NO RACE VARIABLE): 5.8 ML/MIN/1.73 M^2
ESTIMATED AVG GLUCOSE: 91 MG/DL (ref 68–131)
GLUCOSE SERPL-MCNC: 88 MG/DL (ref 70–110)
HBA1C MFR BLD: 4.8 % (ref 4–5.6)
HBV CORE AB SERPL QL IA: REACTIVE
HBV CORE IGM SERPL QL IA: NORMAL
HBV SURFACE AG SERPL QL IA: NORMAL
HCT VFR BLD AUTO: 32.7 % (ref 40–54)
HCV AB SERPL QL IA: NORMAL
HCV RNA SERPL QL NAA+PROBE: NOT DETECTED
HCV RNA SPEC NAA+PROBE-ACNC: NOT DETECTED IU/ML
HGB BLD-MCNC: 10.2 G/DL (ref 14–18)
HIV 1+2 AB+HIV1 P24 AG SERPL QL IA: NORMAL
IMM GRANULOCYTES # BLD AUTO: 0.01 K/UL (ref 0–0.04)
IMM GRANULOCYTES NFR BLD AUTO: 0.3 % (ref 0–0.5)
INR PPP: 1.1 (ref 0.8–1.2)
LYMPHOCYTES # BLD AUTO: 0.8 K/UL (ref 1–4.8)
LYMPHOCYTES NFR BLD: 20.7 % (ref 18–48)
MCH RBC QN AUTO: 26.4 PG (ref 27–31)
MCHC RBC AUTO-ENTMCNC: 31.2 G/DL (ref 32–36)
MCV RBC AUTO: 85 FL (ref 82–98)
MONOCYTES # BLD AUTO: 0.4 K/UL (ref 0.3–1)
MONOCYTES NFR BLD: 9.9 % (ref 4–15)
NEUTROPHILS # BLD AUTO: 2.6 K/UL (ref 1.8–7.7)
NEUTROPHILS NFR BLD: 67 % (ref 38–73)
NRBC BLD-RTO: 0 /100 WBC
OHS QRS DURATION: 138 MS
OHS QTC CALCULATION: 484 MS
PHOSPHATE SERPL-MCNC: 3.1 MG/DL (ref 2.7–4.5)
PLATELET # BLD AUTO: 159 K/UL (ref 150–450)
PMV BLD AUTO: 10.3 FL (ref 9.2–12.9)
POCT GLUCOSE: 101 MG/DL (ref 70–110)
POCT GLUCOSE: 72 MG/DL (ref 70–110)
POCT GLUCOSE: 81 MG/DL (ref 70–110)
POCT GLUCOSE: 90 MG/DL (ref 70–110)
POCT GLUCOSE: 95 MG/DL (ref 70–110)
POTASSIUM SERPL-SCNC: 4.4 MMOL/L (ref 3.5–5.1)
PROT SERPL-MCNC: 7.2 G/DL (ref 6–8.4)
PROT UR-MCNC: 136 MG/DL (ref 0–15)
PROT/CREAT UR: 2.09 MG/G{CREAT} (ref 0–0.2)
PROTHROMBIN TIME: 12 SEC (ref 9–12.5)
RBC # BLD AUTO: 3.86 M/UL (ref 4.6–6.2)
SODIUM SERPL-SCNC: 138 MMOL/L (ref 136–145)
SPECIMEN OUTDATE: NORMAL
WBC # BLD AUTO: 3.82 K/UL (ref 3.9–12.7)

## 2024-10-16 PROCEDURE — 87389 HIV-1 AG W/HIV-1&-2 AB AG IA: CPT | Mod: NTX | Performed by: NURSE PRACTITIONER

## 2024-10-16 PROCEDURE — 63600175 PHARM REV CODE 636 W HCPCS: Mod: NTX | Performed by: NURSE PRACTITIONER

## 2024-10-16 PROCEDURE — 85610 PROTHROMBIN TIME: CPT | Mod: NTX | Performed by: NURSE PRACTITIONER

## 2024-10-16 PROCEDURE — 25000003 PHARM REV CODE 250: Performed by: NURSE ANESTHETIST, CERTIFIED REGISTERED

## 2024-10-16 PROCEDURE — 99223 1ST HOSP IP/OBS HIGH 75: CPT | Mod: 57,NTX,, | Performed by: PHYSICIAN ASSISTANT

## 2024-10-16 PROCEDURE — 99222 1ST HOSP IP/OBS MODERATE 55: CPT | Mod: NTX,,, | Performed by: NURSE PRACTITIONER

## 2024-10-16 PROCEDURE — 86705 HEP B CORE ANTIBODY IGM: CPT | Mod: NTX | Performed by: NURSE PRACTITIONER

## 2024-10-16 PROCEDURE — 27000207 HC ISOLATION: Mod: NTX

## 2024-10-16 PROCEDURE — 80053 COMPREHEN METABOLIC PANEL: CPT | Mod: NTX | Performed by: NURSE PRACTITIONER

## 2024-10-16 PROCEDURE — 93005 ELECTROCARDIOGRAM TRACING: CPT | Mod: NTX

## 2024-10-16 PROCEDURE — 37000008 HC ANESTHESIA 1ST 15 MINUTES: Performed by: TRANSPLANT SURGERY

## 2024-10-16 PROCEDURE — 86706 HEP B SURFACE ANTIBODY: CPT | Mod: NTX | Performed by: NURSE PRACTITIONER

## 2024-10-16 PROCEDURE — 36000931 HC OR TIME LEV VII EA ADD 15 MIN: Performed by: TRANSPLANT SURGERY

## 2024-10-16 PROCEDURE — 36415 COLL VENOUS BLD VENIPUNCTURE: CPT | Mod: NTX | Performed by: NURSE PRACTITIONER

## 2024-10-16 PROCEDURE — 86901 BLOOD TYPING SEROLOGIC RH(D): CPT | Mod: NTX | Performed by: NURSE PRACTITIONER

## 2024-10-16 PROCEDURE — 86704 HEP B CORE ANTIBODY TOTAL: CPT | Mod: NTX | Performed by: NURSE PRACTITIONER

## 2024-10-16 PROCEDURE — 86850 RBC ANTIBODY SCREEN: CPT | Mod: NTX | Performed by: NURSE PRACTITIONER

## 2024-10-16 PROCEDURE — 63600175 PHARM REV CODE 636 W HCPCS: Performed by: NURSE ANESTHETIST, CERTIFIED REGISTERED

## 2024-10-16 PROCEDURE — 36000930 HC OR TIME LEV VII 1ST 15 MIN: Performed by: TRANSPLANT SURGERY

## 2024-10-16 PROCEDURE — 93010 ELECTROCARDIOGRAM REPORT: CPT | Mod: NTX,,, | Performed by: INTERNAL MEDICINE

## 2024-10-16 PROCEDURE — 25000003 PHARM REV CODE 250: Mod: NTX | Performed by: NURSE PRACTITIONER

## 2024-10-16 PROCEDURE — 37000009 HC ANESTHESIA EA ADD 15 MINS: Performed by: TRANSPLANT SURGERY

## 2024-10-16 PROCEDURE — 86803 HEPATITIS C AB TEST: CPT | Mod: NTX | Performed by: NURSE PRACTITIONER

## 2024-10-16 PROCEDURE — 85025 COMPLETE CBC W/AUTO DIFF WBC: CPT | Mod: NTX | Performed by: NURSE PRACTITIONER

## 2024-10-16 PROCEDURE — 71000033 HC RECOVERY, INTIAL HOUR: Performed by: TRANSPLANT SURGERY

## 2024-10-16 PROCEDURE — 87340 HEPATITIS B SURFACE AG IA: CPT | Mod: NTX | Performed by: NURSE PRACTITIONER

## 2024-10-16 PROCEDURE — 71000015 HC POSTOP RECOV 1ST HR: Performed by: TRANSPLANT SURGERY

## 2024-10-16 PROCEDURE — 84156 ASSAY OF PROTEIN URINE: CPT | Performed by: NURSE PRACTITIONER

## 2024-10-16 PROCEDURE — 20600001 HC STEP DOWN PRIVATE ROOM: Mod: NTX

## 2024-10-16 PROCEDURE — 86900 BLOOD TYPING SEROLOGIC ABO: CPT | Mod: NTX | Performed by: NURSE PRACTITIONER

## 2024-10-16 PROCEDURE — 87522 HEPATITIS C REVRS TRNSCRPJ: CPT | Mod: NTX | Performed by: NURSE PRACTITIONER

## 2024-10-16 PROCEDURE — 82962 GLUCOSE BLOOD TEST: CPT | Performed by: TRANSPLANT SURGERY

## 2024-10-16 PROCEDURE — C1876 STENT, NON-COA/NON-COV W/DEL: HCPCS | Performed by: TRANSPLANT SURGERY

## 2024-10-16 PROCEDURE — 85730 THROMBOPLASTIN TIME PARTIAL: CPT | Mod: NTX | Performed by: NURSE PRACTITIONER

## 2024-10-16 PROCEDURE — 82570 ASSAY OF URINE CREATININE: CPT | Performed by: NURSE PRACTITIONER

## 2024-10-16 PROCEDURE — 27201423 OPTIME MED/SURG SUP & DEVICES STERILE SUPPLY: Performed by: TRANSPLANT SURGERY

## 2024-10-16 PROCEDURE — 84100 ASSAY OF PHOSPHORUS: CPT | Mod: NTX | Performed by: NURSE PRACTITIONER

## 2024-10-16 PROCEDURE — 0TY00Z0 TRANSPLANTATION OF RIGHT KIDNEY, ALLOGENEIC, OPEN APPROACH: ICD-10-PCS | Performed by: TRANSPLANT SURGERY

## 2024-10-16 PROCEDURE — 83036 HEMOGLOBIN GLYCOSYLATED A1C: CPT | Mod: NTX | Performed by: NURSE PRACTITIONER

## 2024-10-16 RX ORDER — INSULIN ASPART 100 [IU]/ML
0-10 INJECTION, SOLUTION INTRAVENOUS; SUBCUTANEOUS EVERY 4 HOURS PRN
Status: DISCONTINUED | OUTPATIENT
Start: 2024-10-16 | End: 2024-10-16

## 2024-10-16 RX ORDER — MIDAZOLAM HYDROCHLORIDE 1 MG/ML
INJECTION INTRAMUSCULAR; INTRAVENOUS
Status: DISCONTINUED | OUTPATIENT
Start: 2024-10-16 | End: 2024-10-17

## 2024-10-16 RX ORDER — METHYLPREDNISOLONE SODIUM SUCCINATE 1 G/16ML
INJECTION INTRAMUSCULAR; INTRAVENOUS
Status: DISCONTINUED | OUTPATIENT
Start: 2024-10-16 | End: 2024-10-17

## 2024-10-16 RX ORDER — HEPARIN SODIUM 5000 [USP'U]/ML
5000 INJECTION, SOLUTION INTRAVENOUS; SUBCUTANEOUS ONCE
Status: COMPLETED | OUTPATIENT
Start: 2024-10-16 | End: 2024-10-16

## 2024-10-16 RX ORDER — PROPOFOL 10 MG/ML
VIAL (ML) INTRAVENOUS
Status: DISCONTINUED | OUTPATIENT
Start: 2024-10-16 | End: 2024-10-17

## 2024-10-16 RX ORDER — LIDOCAINE HYDROCHLORIDE 20 MG/ML
INJECTION, SOLUTION EPIDURAL; INFILTRATION; INTRACAUDAL; PERINEURAL
Status: DISCONTINUED | OUTPATIENT
Start: 2024-10-16 | End: 2024-10-17

## 2024-10-16 RX ORDER — ACETAMINOPHEN 650 MG/20.3ML
650 LIQUID ORAL ONCE
Status: COMPLETED | OUTPATIENT
Start: 2024-10-16 | End: 2024-10-16

## 2024-10-16 RX ORDER — CEFAZOLIN 2 G/1
INJECTION, POWDER, FOR SOLUTION INTRAMUSCULAR; INTRAVENOUS
Status: DISCONTINUED | OUTPATIENT
Start: 2024-10-16 | End: 2024-10-17

## 2024-10-16 RX ORDER — SODIUM CHLORIDE 0.9 % (FLUSH) 0.9 %
3 SYRINGE (ML) INJECTION
Status: DISCONTINUED | OUTPATIENT
Start: 2024-10-16 | End: 2024-10-17 | Stop reason: HOSPADM

## 2024-10-16 RX ORDER — MUPIROCIN 20 MG/G
OINTMENT TOPICAL
Status: DISCONTINUED | OUTPATIENT
Start: 2024-10-16 | End: 2024-10-17 | Stop reason: HOSPADM

## 2024-10-16 RX ORDER — DIPHENHYDRAMINE HYDROCHLORIDE 50 MG/ML
50 INJECTION INTRAMUSCULAR; INTRAVENOUS ONCE
Status: COMPLETED | OUTPATIENT
Start: 2024-10-16 | End: 2024-10-16

## 2024-10-16 RX ORDER — PREGABALIN 75 MG/1
75 CAPSULE ORAL
Status: DISCONTINUED | OUTPATIENT
Start: 2024-10-16 | End: 2024-10-17 | Stop reason: HOSPADM

## 2024-10-16 RX ORDER — ROCURONIUM BROMIDE 10 MG/ML
INJECTION, SOLUTION INTRAVENOUS
Status: DISCONTINUED | OUTPATIENT
Start: 2024-10-16 | End: 2024-10-17

## 2024-10-16 RX ORDER — LOSARTAN POTASSIUM 100 MG/1
100 TABLET ORAL
Status: ON HOLD | COMMUNITY
End: 2024-10-17

## 2024-10-16 RX ORDER — INSULIN ASPART 100 [IU]/ML
0-5 INJECTION, SOLUTION INTRAVENOUS; SUBCUTANEOUS EVERY 4 HOURS PRN
Status: DISCONTINUED | OUTPATIENT
Start: 2024-10-16 | End: 2024-10-17

## 2024-10-16 RX ORDER — UBIDECARENONE 30 MG
30 CAPSULE ORAL DAILY
Status: ON HOLD | COMMUNITY
End: 2024-10-18 | Stop reason: HOSPADM

## 2024-10-16 RX ORDER — GLUCAGON 1 MG
1 KIT INJECTION
Status: DISCONTINUED | OUTPATIENT
Start: 2024-10-16 | End: 2024-10-17

## 2024-10-16 RX ADMIN — PROPOFOL 200 MG: 10 INJECTION, EMULSION INTRAVENOUS at 11:10

## 2024-10-16 RX ADMIN — ACETAMINOPHEN 650 MG: 160 SOLUTION ORAL at 11:10

## 2024-10-16 RX ADMIN — CEFAZOLIN 2 G: 330 INJECTION, POWDER, FOR SOLUTION INTRAMUSCULAR; INTRAVENOUS at 11:10

## 2024-10-16 RX ADMIN — FENTANYL CITRATE 100 MCG: 50 INJECTION, SOLUTION INTRAMUSCULAR; INTRAVENOUS at 11:10

## 2024-10-16 RX ADMIN — DIPHENHYDRAMINE HYDROCHLORIDE 50 MG: 50 INJECTION INTRAMUSCULAR; INTRAVENOUS at 11:10

## 2024-10-16 RX ADMIN — ROCURONIUM BROMIDE 50 MG: 10 INJECTION INTRAVENOUS at 11:10

## 2024-10-16 RX ADMIN — SODIUM CHLORIDE, SODIUM GLUCONATE, SODIUM ACETATE, POTASSIUM CHLORIDE, MAGNESIUM CHLORIDE, SODIUM PHOSPHATE, DIBASIC, AND POTASSIUM PHOSPHATE: .53; .5; .37; .037; .03; .012; .00082 INJECTION, SOLUTION INTRAVENOUS at 11:10

## 2024-10-16 RX ADMIN — METHYLPREDNISOLONE SODIUM SUCCINATE 500 MG: 1 INJECTION, POWDER, LYOPHILIZED, FOR SOLUTION INTRAMUSCULAR; INTRAVENOUS at 11:10

## 2024-10-16 RX ADMIN — LIDOCAINE HYDROCHLORIDE 100 MG: 20 INJECTION, SOLUTION EPIDURAL; INFILTRATION; INTRACAUDAL at 11:10

## 2024-10-16 RX ADMIN — ROCURONIUM BROMIDE 20 MG: 10 INJECTION INTRAVENOUS at 11:10

## 2024-10-16 RX ADMIN — MIDAZOLAM 2 MG: 1 INJECTION INTRAMUSCULAR; INTRAVENOUS at 10:10

## 2024-10-16 RX ADMIN — HEPARIN SODIUM 5000 UNITS: 5000 INJECTION INTRAVENOUS; SUBCUTANEOUS at 09:10

## 2024-10-16 RX ADMIN — FENTANYL CITRATE 50 MCG: 50 INJECTION, SOLUTION INTRAMUSCULAR; INTRAVENOUS at 11:10

## 2024-10-16 NOTE — ASSESSMENT & PLAN NOTE
Lab Results   Component Value Date    CREATININE 9.6 (H) 10/16/2024     Plans for kidney transplant on 10/16  Optimize BG control

## 2024-10-16 NOTE — ASSESSMENT & PLAN NOTE
BG goal 140-180    Start Low Dose Correction scale  BG monitoring q 4 hrs while NPO    ** Please call Endocrine for any BG related issues **    Lab Results   Component Value Date    HGBA1C 5.2 07/16/2023

## 2024-10-16 NOTE — ASSESSMENT & PLAN NOTE
- h/o CVA in 2016 suspect related to afib, on apixaban  - Last dose apixaban 10/15 in AM  - Hold apixaban pre-op and restart post-op once appropriate

## 2024-10-16 NOTE — PLAN OF CARE
Patient remains free from injury.  Patient awaiting kidney transplant.  Consents complete.  Patient noted to be orthostatic.  BG < 120.  Patient NPO at 1010.  Patient denies pain.  Patient oliguric and unable to obtain protein/creatinine sample at this time.  Last HD was yesterday.

## 2024-10-16 NOTE — HPI
Reason for Consult: Management of T2DM, Hyperglycemia     Surgical Procedure and Date: tentative plans for kidney transplant on 10/16/24    Diabetes diagnosis year: 2006    Home Diabetes Medications:  Actos 30 mg daily     Lab Results   Component Value Date    HGBA1C 5.2 07/16/2023       How often checking glucose at home?  Daily     BG readings on regimen:   Hypoglycemia on the regimen?  No  Missed doses on regimen?  No    Diabetes Complications include:     Diabetic nephropathy   and Diabetic chronic kidney disease         Complicating diabetes co morbidities:   History of CVA and ESRD      HPI:   Patient is a 58 y.o. male with a diagnosis of AAM with ESRD secondary to diabetic nephropathy. Other PMH includes CVA 2016 suspect related to afib (on apixaban), back surgery 2022, and CAD. He has been on the waitlist for a kidney transplant at [facility] since 1/17/2020. He initially started on HD then changed to PD, then switched back to HD 5/2023. He dialyzes on a TTS schedule, last HD 10/15. Tentative plans for kidney transplant. Endocrinology consulted for management of T2DM.

## 2024-10-16 NOTE — ASSESSMENT & PLAN NOTE
- ESRD 2/2 DM and HTN  - Admit for kidney transplant surgery with Dr. Diane  - Pre op labs and diagnostic studies pending, to be reviewed before surgery

## 2024-10-16 NOTE — TELEPHONE ENCOUNTER
UNOS ID: [id number]    10/16/2024 @0700 am - Report received from Ina STREETER RN. Patient has been admitted and labs drawn. Patient OR time is scheduled after 1st transplant this evening.

## 2024-10-16 NOTE — PROGRESS NOTES
Patient arrived to floor via ambulation accompanied by wife, Ashley.  Patient AAOx4, VSS, and in NAD.  Notified DANNY Chen of patient's arrival.  Patient denies pain and no skin breakdown noted.  Transplant workup initiated.  Arm bands in place.  Will continue to monitor patient.

## 2024-10-16 NOTE — CONSULTS
[facility] - Transplant Stepdown  Endocrinology  Diabetes Consult Note    Consult Requested by: Eron Diane, *   Reason for admit: ESRD (end stage renal disease)    HISTORY OF PRESENT ILLNESS:  Reason for Consult: Management of T2DM, Hyperglycemia     Surgical Procedure and Date: tentative plans for kidney transplant on 10/16/24    Diabetes diagnosis year: 2006    Home Diabetes Medications:  Actos 30 mg daily     Lab Results   Component Value Date    HGBA1C 5.2 07/16/2023       How often checking glucose at home?  Daily     BG readings on regimen:   Hypoglycemia on the regimen?  No  Missed doses on regimen?  No    Diabetes Complications include:     Diabetic nephropathy   and Diabetic chronic kidney disease         Complicating diabetes co morbidities:   History of CVA and ESRD      HPI:   Patient is a 58 y.o. male with a diagnosis of AAM with ESRD secondary to diabetic nephropathy. Other PMH includes CVA 2016 suspect related to afib (on apixaban), back surgery 2022, and CAD. He has been on the waitlist for a kidney transplant at [email or web address]] since 1/17/2020. He initially started on HD then changed to PD, then switched back to HD 5/2023. He dialyzes on a TTS schedule, last HD 10/15. Tentative plans for kidney transplant. Endocrinology consulted for management of T2DM.          Interval HPI:   Overnight events: Remains in TSU. BG well controlled without insulin requirements. Creatinine 9.6. Plans for kidney transplant today. Diet NPO Except for: Sips with Medication    Eating:   NPO  Nausea: No  Hypoglycemia and intervention: No  Fever: No  TPN and/or TF: No  If yes, type of TF/TPN and rate: n/a    PMH, PSH, FH, SH reviewed     ROS:  Constitutional: Negative for weight changes.  Eyes: Negative for visual disturbance.  Respiratory: Negative for cough.   Cardiovascular: Negative for chest pain.  Gastrointestinal: Negative for nausea.  Endocrine: Negative for polyuria, polydipsia.  Musculoskeletal: Negative for  back pain.  Skin: Negative for rash.  Neurological: Negative for syncope.  Psychiatric/Behavioral: Negative for depression.      Review of Systems    Current Medications and/or Treatments Impacting Glycemic Control  Immunotherapy:    Immunosuppressants           Stop Route Frequency     antithymocyte globulin (rabbit) 100 mg, hydrocortisone sodium succinate (SOLU-CORTEF) 20 mg in 0.9% NaCl 500 mL (FOR PERIPHERAL LINE ADMINISTRATION ONLY)         -- IV Once          Steroids:   Hormones (From admission, onward)      Start     Stop Route Frequency Ordered    10/16/24 0945  antithymocyte globulin (rabbit) 100 mg, hydrocortisone sodium succinate (SOLU-CORTEF) 20 mg in 0.9% NaCl 500 mL (FOR PERIPHERAL LINE ADMINISTRATION ONLY)         -- IV Once 10/16/24 0830    10/16/24 0800  methylPREDNISolone sodium succinate (SOLU-MEDROL) 500 mg in D5W 100 mL IVPB  (IP TXP INTRA-OP THYMO - PERIPHERAL THYMO PRECHECKED)         -- IV Once 10/16/24 0751          Pressors:    Autonomic Drugs (From admission, onward)      None          Hyperglycemia/Diabetes Medications:   Antihyperglycemics (From admission, onward)      None             PHYSICAL EXAMINATION:  Vitals:    10/16/24 0816   BP: (!) 154/70   Pulse: (!) 55   Resp: 18     Body mass index is 30.25 kg/m².     Physical Exam   Constitutional: Well developed, well nourished, NAD.  ENT: External ears no masses with nose patent; normal hearing.  Neck: Supple; trachea midline.  Cardiovascular: Normal heart sounds, no LE edema. DP +2 bilaterally.  Lungs: Normal effort; lungs anterior bilaterally clear to auscultation.  Abdomen: Soft, no masses, no hernias.  MS: No clubbing or cyanosis of nails noted; unable to assess gait.  Skin: No rashes, lesions, or ulcers; no nodules.   Psychiatric: Good judgement and insight; normal mood and affect.  Neurological: Cranial nerves are grossly intact.   Foot: Nails in good condition, no amputations noted.      Labs Reviewed and Include   Recent Labs  "  Lab 10/16/24  0816   GLU 88   CALCIUM 9.5   ALBUMIN 3.8   PROT 7.2      K 4.4   CO2 28   CL 98   BUN 40*   CREATININE 9.6*   ALKPHOS 80   ALT 9*   AST 18   BILITOT 0.6     Lab Results   Component Value Date    WBC 3.82 (L) 10/16/2024    HGB 10.2 (L) 10/16/2024    HCT 32.7 (L) 10/16/2024    MCV 85 10/16/2024     10/16/2024     No results for input(s): "TSH", "FREET4" in the last 168 hours.  Lab Results   Component Value Date    HGBA1C 5.2 07/16/2023       Nutritional status:   Body mass index is 30.25 kg/m².  Lab Results   Component Value Date    ALBUMIN 3.8 10/16/2024    ALBUMIN 3.9 07/17/2023    ALBUMIN 4.2 07/16/2023     No results found for: "PREALBUMIN"    Estimated Creatinine Clearance: 8.9 mL/min (A) (based on SCr of 9.6 mg/dL (H)).    Accu-Checks  Recent Labs     10/16/24  0957   POCTGLUCOSE 101        ASSESSMENT and PLAN    Renal/  * ESRD (end stage renal disease)  Lab Results   Component Value Date    CREATININE 9.6 (H) 10/16/2024     Plans for kidney transplant on 10/16  Optimize BG control      Endocrine  Controlled type 2 diabetes mellitus with kidney complication, without long-term current use of insulin  BG goal 140-180    Start Low Dose Correction scale  BG monitoring q 4 hrs while NPO    ** Please call Endocrine for any BG related issues **    Lab Results   Component Value Date    HGBA1C 5.2 07/16/2023               Plan discussed with patient at bedside.     Maria Del Rosario Kelsey, NP  Endocrinology  [facility] - Transplant Step[facility]  "

## 2024-10-16 NOTE — SUBJECTIVE & OBJECTIVE
Interval HPI:   Overnight events: Remains in TSU. BG well controlled without insulin requirements. Creatinine 9.6. Plans for kidney transplant today. Diet NPO Except for: Sips with Medication    Eating:   NPO  Nausea: No  Hypoglycemia and intervention: No  Fever: No  TPN and/or TF: No  If yes, type of TF/TPN and rate: n/a    PMH, PSH, FH, SH reviewed     ROS:  Constitutional: Negative for weight changes.  Eyes: Negative for visual disturbance.  Respiratory: Negative for cough.   Cardiovascular: Negative for chest pain.  Gastrointestinal: Negative for nausea.  Endocrine: Negative for polyuria, polydipsia.  Musculoskeletal: Negative for back pain.  Skin: Negative for rash.  Neurological: Negative for syncope.  Psychiatric/Behavioral: Negative for depression.      Review of Systems    Current Medications and/or Treatments Impacting Glycemic Control  Immunotherapy:    Immunosuppressants           Stop Route Frequency     antithymocyte globulin (rabbit) 100 mg, hydrocortisone sodium succinate (SOLU-CORTEF) 20 mg in 0.9% NaCl 500 mL (FOR PERIPHERAL LINE ADMINISTRATION ONLY)         -- IV Once          Steroids:   Hormones (From admission, onward)      Start     Stop Route Frequency Ordered    10/16/24 0945  antithymocyte globulin (rabbit) 100 mg, hydrocortisone sodium succinate (SOLU-CORTEF) 20 mg in 0.9% NaCl 500 mL (FOR PERIPHERAL LINE ADMINISTRATION ONLY)         -- IV Once 10/16/24 0830    10/16/24 0800  methylPREDNISolone sodium succinate (SOLU-MEDROL) 500 mg in D5W 100 mL IVPB  (IP TXP INTRA-OP THYMO - PERIPHERAL THYMO PRECHECKED)         -- IV Once 10/16/24 0751          Pressors:    Autonomic Drugs (From admission, onward)      None          Hyperglycemia/Diabetes Medications:   Antihyperglycemics (From admission, onward)      None             PHYSICAL EXAMINATION:  Vitals:    10/16/24 0816   BP: (!) 154/70   Pulse: (!) 55   Resp: 18     Body mass index is 30.25 kg/m².     Physical Exam   Constitutional: Well  developed, well nourished, NAD.  ENT: External ears no masses with nose patent; normal hearing.  Neck: Supple; trachea midline.  Cardiovascular: Normal heart sounds, no LE edema. DP +2 bilaterally.  Lungs: Normal effort; lungs anterior bilaterally clear to auscultation.  Abdomen: Soft, no masses, no hernias.  MS: No clubbing or cyanosis of nails noted; unable to assess gait.  Skin: No rashes, lesions, or ulcers; no nodules.   Psychiatric: Good judgement and insight; normal mood and affect.  Neurological: Cranial nerves are grossly intact.   Foot: Nails in good condition, no amputations noted.

## 2024-10-16 NOTE — HPI
Chidi Lisa is a 58 yr AAM with ESRD secondary to diabetic nephropathy. Other PMH includes CVA 2016 suspect related to afib (on apixaban), back surgery 2022, and CAD. He has been on the waitlist for a kidney transplant at [facility] since 1/17/2020. He initially started on HD then changed to PD, then switched back to HD 5/2023. He dialyzes on a TTS schedule, last HD 10/15. He reports that he is tolerating HD well. He has a LUE AV fistula. DW 87.5 kg. Native uop < 1 cups per day. He is being admitted for kidney transplant surgery. He feels well in his usual state of health. He denies any recent illnesses or hospitalizations. Pre op labs and diagnostic studies pending, to be reviewed before surgery.

## 2024-10-16 NOTE — ANESTHESIA PREPROCEDURE EVALUATION
[facility]  Anesthesia Pre-Operative Evaluation         Patient Name: Chidi Lisa  YOB: 1965  MRN: [id number]    SUBJECTIVE:     Pre-operative evaluation for Procedure(s) (LRB):  TRANSPLANT, KIDNEY (N/A)     10/16/2024    Chidi Lisa is a 58 y.o. male w/ a significant PMHx of ESRD secondary to diabetic nephropathy, DM Type 2, atrial fibrillation (on eliquis), HTN (furosemide, nifedipine, clonidine, carvedilol, losartan), CHF (grade 2 diastolic dysfunction), CVA 2016 suspected to be related to afib, back surgery 2022, and CAD who has been on the kidney transplant waiting list.    Patient now presents for the above procedure(s).    Cardiac TTE 3/8/2024:    Left Ventricle: The left ventricle is mildly dilated. Increased ventricular mass. Increased wall thickness. There is eccentric hypertrophy. See diagram for wall motion findings. There is normal systolic function. Ejection fraction by visual approximation is 55%. Grade II diastolic dysfunction.    Right Ventricle: Normal right ventricular cavity size. Right ventricle wall motion  is normal. Systolic function is normal.    Left Atrium: Left atrium is severely dilated.    Right Atrium: Right atrium is dilated.    Aortic Valve: The aortic valve is likely a bicuspid valve with fusion of the right and left coronary cusps. There is moderate aortic valve sclerosis. There is annular calcification present. Mildly restricted motion without significant stenosis. There is mild to moderate aortic regurgitation.    Mitral Valve: Moderately thickened leaflets. Mildly restricted motion of the posterior leaflet. There is mild regurgitation.    Tricuspid Valve: There is mild regurgitation.    Aorta: Ascending aorta is moderately dilated measuring 4.72 cm.    Pulmonary Artery: The estimated pulmonary artery systolic pressure is 30 mmHg.    IVC/SVC: Normal venous pressure at 3 mmHg.       LDA:        Hemodialysis AV Fistula 02/27/23  0605 Left forearm (Active)   Needle Size 15ga 07/16/23 0745   Site Assessment Clean;Dry;Intact;No redness;No swelling 07/17/23 0859   Patency Present;Thrill;Bruit 07/17/23 0859   Status Deaccessed 07/16/23 1045   Flows Good 07/16/23 1045   Dressing Intervention Sterile dressing change 07/16/23 1045   Dressing Status Intact;Dry;Clean 07/17/23 0455   Site Condition No complications 07/17/23 0859   Dressing Gauze 07/17/23 0455   Number of days: 597       Prev airway:     Intubation     Date/Time: 9/26/2022 1:50 PM  Performed by: Attila Valdez CRNA  Authorized by: Antonino Casanova MD      Intubation:     Induction:  Intravenous    Intubated:  Postinduction    Mask Ventilation:  Easy mask    Attempts:  1    Attempted By:  CRNA    Method of Intubation:  Direct and bougie    Blade:  Maude 3    Laryngeal View Grade: Grade III - only epiglottis visible      Difficult Airway Encountered?: No      Complications:  None    Airway Device:  Direct and oral endotracheal tube    Airway Device Size:  7.5    Style/Cuff Inflation:  Cuffed (inflated to minimal occlusive pressure)    Inflation Amount (mL):  6    Tube secured:  21    Secured at:  The lips    Placement Verified By:  Auscultation and Capnometry    Complicating Factors:  Large prominent central incisors    Findings Post-Intubation:  Bilateral breath sounds, positive ETCO2 and atraumatic / condition of teeth unchanged       Drips: None documented.      Patient Active Problem List   Diagnosis    Controlled type 2 diabetes mellitus with kidney complication, without long-term current use of insulin    ESRD on peritoneal dialysis    Benign hypertension with ESRD (end-stage renal disease)    TIA (transient ischemic attack)    Patient on waiting list for kidney transplant    Long term (current) use of anticoagulants--Eliquis    Spinal stenosis of lumbar region with neurogenic claudication    CAD (coronary artery disease)    Abnormal myocardial perfusion study    ESRD (end stage  renal disease)    Hypertensive emergency    Class 1 obesity due to excess calories with serious comorbidity and body mass index (BMI) of 30.0 to 30.9 in adult    Anemia of chronic disease       Review of patient's allergies indicates:  No Known Allergies    Current Inpatient Medications:   acetaminophen  650 mg Per NG tube Once    antithymocyte globulin (rabbit) 100 mg, hydrocortisone sodium succinate (SOLU-CORTEF) 20 mg in 0.9% NaCl 500 mL (FOR PERIPHERAL LINE ADMINISTRATION ONLY)  1.5 mg/kg (Adjusted) Intravenous Once    diphenhydrAMINE  50 mg Intravenous Once    heparin (porcine)  5,000 Units Subcutaneous Once    methylPREDNISolone injection (PEDS and ADULTS)  500 mg Intravenous Once       No current facility-administered medications on file prior to encounter.     Current Outpatient Medications on File Prior to Encounter   Medication Sig Dispense Refill    allopurinol (ZYLOPRIM) 100 MG tablet Take 50 mg by mouth once daily.      apixaban (ELIQUIS) 2.5 mg Tab Take 1 tablet (2.5 mg total) by mouth 2 (two) times daily.      atorvastatin (LIPITOR) 40 MG tablet Take 40 mg by mouth every evening.       calcium carbonate (TUMS) 300 mg (750 mg) Chew Take 1,000 mg by mouth 3 (three) times daily with meals.      carvediloL (COREG) 3.125 MG tablet Take 1 tablet (3.125 mg total) by mouth 2 (two) times daily. 60 tablet 11    cloNIDine (CATAPRES) 0.1 MG tablet Take 0.1 mg by mouth every evening. AND IN THE MORNING TAKES 1/2 TABLET If SBP is > 145 (Patient taking differently: Take 0.1 mg by mouth every 6 (six) hours as needed. If SBP is > 145)      doxazosin (CARDURA) 4 MG tablet Take 4 mg by mouth once daily. (Patient taking differently: Take 4 mg by mouth every 12 (twelve) hours.)      folic acid/vit B complex and C (NEPHRO-ALLY ORAL) Take 0.8 mg by mouth once daily.      furosemide (LASIX) 80 MG tablet Take 80 mg by mouth 2 (two) times a day. (Patient taking differently: Take 80 mg by mouth 2 (two) times daily as needed  (fluid retention).)      NIFEdipine (PROCARDIA-XL) 60 MG (OSM) 24 hr tablet Take 1 tablet (60 mg total) by mouth 2 (two) times a day. 60 tablet 11    pioglitazone (ACTOS) 30 MG tablet Take 30 mg by mouth once daily.      tadalafiL (CIALIS) 5 MG tablet Take 5 mg by mouth daily as needed for Erectile Dysfunction.      co-enzyme Q-10 30 mg capsule Take 30 mg by mouth once daily.      docusate sodium (COLACE) 100 MG capsule Take 200 mg by mouth 2 (two) times daily as needed.      losartan (COZAAR) 100 MG tablet Take 100 mg by mouth.      sildenafiL (VIAGRA) 50 MG tablet Take 50 mg by mouth daily as needed. (Patient not taking: Reported on 10/16/2024)      traMADoL (ULTRAM) 50 mg tablet Take 50 mg by mouth every 6 (six) hours.         Past Surgical History:   Procedure Laterality Date    AV FISTULA PLACEMENT Left     FACETECTOMY OF VERTEBRA Right 9/26/2022    Procedure: FACETECTOMY, PARTIAL MEDIAL;  Surgeon: Tom Sun MD;  Location: [facility] OR;  Service: Orthopedics;  Laterality: Right;    FORAMINOTOMY Right 9/26/2022    Procedure: FORAMINOTOMY, SPINE;  Surgeon: Tom Sun MD;  Location: [facility] OR;  Service: Orthopedics;  Laterality: Right;    HERNIA REPAIR      LAPAROSCOPIC INSERTION OF PERITONEAL DIALYSIS CATHETER      LEFT HEART CATHETERIZATION Left 2/27/2023    Procedure: Left heart cath;  Surgeon: Yohan Evangelista MD;  Location: [facility] CATH;  Service: Cardiology;  Laterality: Left;    LUMBAR LAMINECTOMY WITH DISCECTOMY Right 9/26/2022    Procedure: LAMINECTOMY, SPINE, LUMBAR, WITH DISCECTOMY, HEMILAMINECTOMY, RIGHT L4-5;  Surgeon: Tom Sun MD;  Location: [facility] OR;  Service: Orthopedics;  Laterality: Right;    RIGHT HEART CATHETERIZATION Right 11/20/2019    Procedure: INSERTION, CATHETER, RIGHT HEART;  Surgeon: Cinthia Coley MD;  Location: [facility] CATH LAB;  Service: Cardiology;  Laterality: Right;       Social History     Socioeconomic History    Marital status:    Tobacco Use    Smoking status: Former      Current packs/day: 0.00     Types: Cigarettes     Quit date: 2019     Years since quittin.7    Smokeless tobacco: Never   Substance and Sexual Activity    Alcohol use: Not Currently     Comment: monthly    Drug use: No     Social Drivers of Health     Financial Resource Strain: Low Risk  (10/16/2024)    Overall Financial Resource Strain (CARDIA)     Difficulty of Paying Living Expenses: Not very hard   Food Insecurity: No Food Insecurity (10/16/2024)    Hunger Vital Sign     Worried About Running Out of Food in the Last Year: Never true     Ran Out of Food in the Last Year: Never true   Transportation Needs: No Transportation Needs (10/16/2024)    TRANSPORTATION NEEDS     Transportation : No   Stress: No Stress Concern Present (10/16/2024)    [employer] - Occupational Stress Questionnaire     Feeling of Stress : Not at all   Housing Stability: Low Risk  (10/16/2024)    Housing Stability Vital Sign     Unable to Pay for Housing in the Last Year: No     Homeless in the Last Year: No       OBJECTIVE:     Vital Signs Range (Last 24H):  Temp:  [36.7 °C (98 °F)-36.9 °C (98.5 °F)]   Pulse:  [55]   Resp:  [18-19]   BP: (154-155)/(70-93)   SpO2:  [98 %-100 %]       Significant Labs:  Lab Results   Component Value Date    WBC 3.82 (L) 10/16/2024    HGB 10.2 (L) 10/16/2024    HCT 32.7 (L) 10/16/2024     10/16/2024    CHOL 145 2022    TRIG 104 2022    HDL 42 2022    ALT 9 (L) 10/16/2024    AST 18 10/16/2024     10/16/2024    K 4.4 10/16/2024    CL 98 10/16/2024    CREATININE 9.6 (H) 10/16/2024    BUN 40 (H) 10/16/2024    CO2 28 10/16/2024    PSA 0.92 2024    INR 1.1 10/16/2024    HGBA1C 5.2 2023    MICROALBUR 3,808.9 2022       Diagnostic Studies: No relevant studies.    EKG:   Results for orders placed or performed during the hospital encounter of 10/16/24   EKG 12-lead    Collection Time: 10/16/24  8:21 AM   Result Value Ref Range     QRS Duration 138 ms    [facility] QTC Calculation 484 ms    Narrative    Test Reason : Z01.818,    Vent. Rate : 065 BPM     Atrial Rate : 065 BPM     P-R Int : 234 ms          QRS Dur : 138 ms      QT Int : 466 ms       P-R-T Axes : 043 -67 104 degrees     QTc Int : 484 ms    Sinus rhythm with 1st degree A-V block  Left axis deviation  Intraventricular conduction delay  Abnormal ECG  When compared with ECG of 16-JUL-2023 00:10,  No significant change was found  Confirmed by Ten Wright MD ([id number]) on 10/16/2024 8:46:20 AM    Referred By: KATE PUENTE           Confirmed By:Ten Wright MD       2D ECHO:  TTE:  Results for orders placed or performed during the hospital encounter of 03/08/24   Echo   Result Value Ref Range    RA Width 4.40 cm    LA Vol (MOD) 95.00 cm3    Left Atrium Major Axis 6.33 cm    Left Atrium Minor Axis 5.54 cm    RA Major Axis 5.15 cm    LV Diastolic Volume 167.25 mL    LV Systolic Volume 59.05 mL    MV Peak A Song 1.35 m/s    MV stenosis pressure 1/2 time 109.65 ms    TR Max Song 2.61 m/s    MV Peak E Song 1.51 m/s    Mr max song 0.06 m/s    Ao VTI 18.55 cm    Ao peak song 0.89 m/s    LVOT peak VTI 20.03 cm    LVOT peak song 0.84 m/s    LVOT diameter 2.1 cm    E wave deceleration time 378.09 msec    AV mean gradient 1 mmHg    RV S' 10.15 cm/s    TAPSE 2.23 cm    RVDD 3.44 cm    LA size 4.66 cm    Ascending aorta 4.72 cm    STJ 3.16 cm    Sinus 3.63 cm    LVIDs 4.5 (A) 2.1 - 4.0 cm    PW 1.2 (A) 0.6 - 1.1 cm    IVS 1.3 (A) 0.6 - 1.1 cm    LVIDd 6.1 (A) 3.5 - 6.0 cm    TDI LATERAL 0.05 m/s    LA WIDTH 4.57 cm    TDI SEPTAL 0.06 m/s    LV LATERAL E/E' RATIO 30.20 m/s    LV SEPTAL E/E' RATIO 25.17 m/s    FS 26 (A) 28 - 44 %    LA Vol 106.96 cm3    LV mass 340.95 g    Left Ventricle Relative Wall Thickness 0.39 cm    AV valve area 3.74 cm²    AV Velocity Ratio 0.94     AV index (prosthetic) 1.08     MV valve area p 1/2 method 2.01 cm2    E/A ratio 1.12     Mean e' 0.06 m/s    LVOT area 3.5 cm2    LVOT  stroke volume 69.34 cm3    AV peak gradient 3 mmHg    E/E' ratio 27.45 m/s    Triscuspid Valve Regurgitation Peak Gradient 27 mmHg    AVELINA by Velocity Ratio 3.27 cm²    BSA 2 m2    LV Systolic Volume Index 30.1 mL/m2    LV Diastolic Volume Index 85.33 mL/m2    LV Mass Index 174 g/m2    ISH 54.6 mL/m2    ISH (MOD) 48.5 mL/m2    ZLVIDS 2.10     ZLVIDD 0.84     TV resting pulmonary artery pressure 30 mmHg    RV TB RVSP 6 mmHg    Est. RA pres 3 mmHg    EF 55 %    RA Area 18.5 cm2    Narrative      Left Ventricle: The left ventricle is mildly dilated. Increased   ventricular mass. Increased wall thickness. There is eccentric   hypertrophy. See diagram for wall motion findings. There is normal   systolic function. Ejection fraction by visual approximation is 55%. Grade   II diastolic dysfunction.    Right Ventricle: Normal right ventricular cavity size. Right ventricle   wall motion  is normal. Systolic function is normal.    Left Atrium: Left atrium is severely dilated.    Right Atrium: Right atrium is dilated.    Aortic Valve: The aortic valve is likely a bicuspid valve with fusion   of the right and left coronary cusps. There is moderate aortic valve   sclerosis. There is annular calcification present. Mildly restricted   motion without significant stenosis. There is mild to moderate aortic   regurgitation.    Mitral Valve: Moderately thickened leaflets. Mildly restricted motion   of the posterior leaflet. There is mild regurgitation.    Tricuspid Valve: There is mild regurgitation.    Aorta: Ascending aorta is moderately dilated measuring 4.72 cm.    Pulmonary Artery: The estimated pulmonary artery systolic pressure is   30 mmHg.    IVC/SVC: Normal venous pressure at 3 mmHg.         JUAN JOSE:  No results found for this or any previous visit.    ASSESSMENT/PLAN:         Pre-op Assessment    I have reviewed the Patient Summary Reports.     I have reviewed the Nursing Notes. I have reviewed the NPO Status.   I have reviewed  the Medications.     Review of Systems  Anesthesia Hx:  No problems with previous Anesthesia   History of prior surgery of interest to airway management or planning:  Previous anesthesia: General        Denies Family Hx of Anesthesia complications.    Denies Personal Hx of Anesthesia complications.                    Social:  Former Smoker, No Alcohol Use       Hematology/Oncology:       -- Anemia:                                  Cardiovascular:  Exercise tolerance: good   Hypertension   CAD           hyperlipidemia   ECG has been reviewed.               Carotoid Artery Disease (bruit)         Hypertension         Pulmonary:  Pulmonary Normal                       Renal/:  Chronic Renal Disease, ESRD, Dialysis   Renal HTN  Left AV fistula     Kidney Function/Disease, Chronic Kidney Disease (CKD) , CKD Stage V (ESRD) (GFR <15 or on Dialysis)         Dialysis Dependent, peritoneal dialysis     Hepatic/GI:  Hepatic/GI Normal                    Musculoskeletal:  Arthritis   L4-5       Spine Disorders: lumbar Disc disease           Neurological:  TIA, (2016) CVA, no residual symptoms                                    Endocrine:  Diabetes, type 2    Diabetes for 12 years  , controlled by diet, oral hypoglycemics.                   Psych:  Psychiatric Normal                    Physical Exam  General: Well nourished, Cooperative, Alert and Oriented    Airway:  Mallampati: III / II  Mouth Opening: Normal  TM Distance: Normal  Tongue: Large  Neck ROM: Normal ROM    Dental:  Intact    Chest/Lungs:  Clear to auscultation    Heart:  Rate: Normal    Abdomen:  Normal        Anesthesia Plan  Type of Anesthesia, risks & benefits discussed:    Anesthesia Type: Gen ETT  Intra-op Monitoring Plan: Standard ASA Monitors, Art Line and Central Line  Post Op Pain Control Plan: multimodal analgesia and IV/PO Opioids PRN  Induction:  IV  Airway Plan: Video and Direct, Post-Induction  Informed Consent: Informed consent signed with the  Patient and all parties understand the risks and agree with anesthesia plan.  All questions answered.   ASA Score: 3  Day of Surgery Review of History & Physical: H&P Update referred to the surgeon/provider.    Ready For Surgery From Anesthesia Perspective.     .

## 2024-10-16 NOTE — SUBJECTIVE & OBJECTIVE
Subjective:     Chief Complaint/Reason for Admission: Kidney Transplant      History of Present Illness:  Chidi Lisa is a 58 yr AAM with ESRD secondary to diabetic nephropathy. Other PMH includes CVA 2016 suspect related to afib (on apixaban), back surgery 2022, and CAD. He has been on the waitlist for a kidney transplant at [facility] since 1/17/2020. He initially started on HD then changed to PD, then switched back to HD 5/2023. He dialyzes on a TTS schedule, last HD 10/15. He reports that he is tolerating HD well. He has a LUE AV fistula. DW 87.5 kg. Native uop < 1 cups per day. He is being admitted for kidney transplant surgery. He feels well in his usual state of health. He denies any recent illnesses or hospitalizations. Pre op labs and diagnostic studies pending, to be reviewed before surgery.     Dialysis History: Mr. Murillo with ESRD, requiring chronic dialysis who is on hemodialysis started on 5/2023. Patient is dialyzing on TTS schedule.  Patient reports that he is tolerating dialysis well.  Date of Last Dialysis: 10/15/24    Native urine output per day: <1 cup per day     Previous Transplant: no    PTA Medications   Medication Sig    allopurinol (ZYLOPRIM) 100 MG tablet Take 50 mg by mouth once daily.    apixaban (ELIQUIS) 2.5 mg Tab Take 1 tablet (2.5 mg total) by mouth 2 (two) times daily.    atorvastatin (LIPITOR) 40 MG tablet Take 40 mg by mouth every evening.     calcium carbonate (TUMS) 300 mg (750 mg) Chew Take 1,000 mg by mouth 3 (three) times daily with meals.    carvediloL (COREG) 3.125 MG tablet Take 1 tablet (3.125 mg total) by mouth 2 (two) times daily.    cloNIDine (CATAPRES) 0.1 MG tablet Take 0.1 mg by mouth every evening. AND IN THE MORNING TAKES 1/2 TABLET If SBP is > 145    doxazosin (CARDURA) 4 MG tablet Take 4 mg by mouth once daily.    folic acid/vit B complex and C (NEPHRO-ALLY ORAL) Take 0.8 mg by mouth once daily.    furosemide (LASIX) 80 MG tablet Take 80 mg by mouth 2 (two)  times a day.    NIFEdipine (PROCARDIA-XL) 60 MG (OSM) 24 hr tablet Take 1 tablet (60 mg total) by mouth 2 (two) times a day.    pioglitazone (ACTOS) 30 MG tablet Take 30 mg by mouth once daily.    tadalafiL (CIALIS) 5 MG tablet Take 5 mg by mouth daily as needed for Erectile Dysfunction.    co-enzyme Q-10 30 mg capsule Take 30 mg by mouth once daily.    docusate sodium (COLACE) 100 MG capsule Take 200 mg by mouth 2 (two) times daily as needed.    losartan (COZAAR) 25 MG tablet Take 1 tablet (25 mg total) by mouth once daily.    sildenafiL (VIAGRA) 50 MG tablet Take 50 mg by mouth daily as needed. (Patient not taking: Reported on 10/16/2024)    traMADoL (ULTRAM) 50 mg tablet Take 50 mg by mouth every 6 (six) hours.       Review of patient's allergies indicates:  No Known Allergies    Past Medical History:   Diagnosis Date    Anticoagulant long-term use     Arthritis     Carotid artery disease     Diabetes mellitus     Dialysis patient     [facility] FRESENIUS-  UPPER ARM AV SHUNT    Gout, unspecified     Hypertension     Renal disorder     Stroke 2016    TIA    TIA (transient ischemic attack) 10/04/2019     Past Surgical History:   Procedure Laterality Date    AV FISTULA PLACEMENT Left     FACETECTOMY OF VERTEBRA Right 9/26/2022    Procedure: FACETECTOMY, PARTIAL MEDIAL;  Surgeon: Tom Sun MD;  Location: [facility] OR;  Service: Orthopedics;  Laterality: Right;    FORAMINOTOMY Right 9/26/2022    Procedure: FORAMINOTOMY, SPINE;  Surgeon: Tom Sun MD;  Location: [facility] OR;  Service: Orthopedics;  Laterality: Right;    HERNIA REPAIR      LAPAROSCOPIC INSERTION OF PERITONEAL DIALYSIS CATHETER      LEFT HEART CATHETERIZATION Left 2/27/2023    Procedure: Left heart cath;  Surgeon: Yohan Evangelista MD;  Location: [facility] CATH;  Service: Cardiology;  Laterality: Left;    LUMBAR LAMINECTOMY WITH DISCECTOMY Right 9/26/2022    Procedure: LAMINECTOMY, SPINE, LUMBAR, WITH DISCECTOMY, HEMILAMINECTOMY, RIGHT L4-5;   "Surgeon: Tom Sun MD;  [facility];  Service: Orthopedics;  Laterality: Right;    RIGHT HEART CATHETERIZATION Right 2019    Procedure: INSERTION, CATHETER, RIGHT HEART;  Surgeon: Cinthia Cloey MD;  Location: [facility] CATH LAB;  Service: Cardiology;  Laterality: Right;     Family History       Problem Relation (Age of Onset)    Hyperlipidemia Mother    Kidney disease Father    Stroke Father          Tobacco Use    Smoking status: Former     Current packs/day: 0.00     Types: Cigarettes     Quit date: 2019     Years since quittin.7    Smokeless tobacco: Never   Substance and Sexual Activity    Alcohol use: Not Currently     Comment: monthly    Drug use: No    Sexual activity: Not on file        Review of Systems   Constitutional:  Negative for activity change, chills and fever.   HENT: Negative.     Eyes: Negative.    Respiratory:  Negative for shortness of breath.    Cardiovascular:  Negative for chest pain and leg swelling.   Gastrointestinal:  Positive for abdominal distention. Negative for abdominal pain, constipation, diarrhea, nausea and vomiting.   Endocrine: Negative.    Genitourinary:  Positive for decreased urine volume. Negative for difficulty urinating, dysuria and hematuria.   Musculoskeletal:  Negative for arthralgias and myalgias.   Skin:  Negative for wound.   Neurological:  Negative for dizziness, weakness and light-headedness.   Hematological: Negative.    Psychiatric/Behavioral:  Negative for behavioral problems and confusion. The patient is not nervous/anxious.      Objective:     Vital Signs (Most Recent):  Pulse: (!) 55 (10/16/24 0816)  Resp: 18 (10/16/24 0816)  BP: (!) 154/70 (10/16/24 0816)  Height: 5' 7" (170.2 cm)  Weight: 87.6 kg (193 lb 2 oz)  Body mass index is 30.25 kg/m².      Physical Exam  Vitals and nursing note reviewed.   Constitutional:       General: He is not in acute distress.     Appearance: Normal appearance. He is normal weight. He is not " ill-appearing.   Cardiovascular:      Rate and Rhythm: Normal rate.      Pulses: Normal pulses.   Pulmonary:      Effort: Pulmonary effort is normal.   Abdominal:      General: Bowel sounds are normal.      Palpations: Abdomen is soft.      Tenderness: There is no abdominal tenderness.   Musculoskeletal:         General: Normal range of motion.      Cervical back: Normal range of motion.      Right lower leg: No edema.      Left lower leg: No edema.      Comments: +/+ LUE AV fistula   Skin:     General: Skin is warm and dry.   Neurological:      General: No focal deficit present.      Mental Status: He is alert and oriented to person, place, and time. Mental status is at baseline.      Motor: No weakness.   Psychiatric:         Mood and Affect: Mood normal.         Behavior: Behavior normal.         Thought Content: Thought content normal.         Judgment: Judgment normal.          Pre op labs and imaging pending.

## 2024-10-16 NOTE — H&P
[facility] - Transplant Stepdown  Kidney Transplant  H&P      Subjective:     Chief Complaint/Reason for Admission: Kidney Transplant      History of Present Illness:  Chidi Lisa is a 58 yr AAM with ESRD secondary to diabetic nephropathy. Other PMH includes CVA 2016 suspect related to afib (on apixaban), back surgery 2022, and CAD. He has been on the waitlist for a kidney transplant at [facility] since 1/17/2020. He initially started on HD then changed to PD, then switched back to HD 5/2023. He dialyzes on a TTS schedule, last HD 10/15. He reports that he is tolerating HD well. He has a LUE AV fistula. DW 87.5 kg. Native uop < 1 cups per day. He is being admitted for kidney transplant surgery. He feels well in his usual state of health. He denies any recent illnesses or hospitalizations. Pre op labs and diagnostic studies pending, to be reviewed before surgery.     Dialysis History: Mr. Murillo with ESRD, requiring chronic dialysis who is on hemodialysis started on 5/2023. Patient is dialyzing on TTS schedule.  Patient reports that he is tolerating dialysis well.  Date of Last Dialysis: 10/15/24    Native urine output per day: <1 cup per day     Previous Transplant: no    PTA Medications   Medication Sig    allopurinol (ZYLOPRIM) 100 MG tablet Take 50 mg by mouth once daily.    apixaban (ELIQUIS) 2.5 mg Tab Take 1 tablet (2.5 mg total) by mouth 2 (two) times daily.    atorvastatin (LIPITOR) 40 MG tablet Take 40 mg by mouth every evening.     calcium carbonate (TUMS) 300 mg (750 mg) Chew Take 1,000 mg by mouth 3 (three) times daily with meals.    carvediloL (COREG) 3.125 MG tablet Take 1 tablet (3.125 mg total) by mouth 2 (two) times daily.    cloNIDine (CATAPRES) 0.1 MG tablet Take 0.1 mg by mouth every evening. AND IN THE MORNING TAKES 1/2 TABLET If SBP is > 145    doxazosin (CARDURA) 4 MG tablet Take 4 mg by mouth once daily.    folic acid/vit B complex and C (NEPHRO-ALLY ORAL) Take 0.8 mg by mouth once daily.     furosemide (LASIX) 80 MG tablet Take 80 mg by mouth 2 (two) times a day.    NIFEdipine (PROCARDIA-XL) 60 MG (OSM) 24 hr tablet Take 1 tablet (60 mg total) by mouth 2 (two) times a day.    pioglitazone (ACTOS) 30 MG tablet Take 30 mg by mouth once daily.    tadalafiL (CIALIS) 5 MG tablet Take 5 mg by mouth daily as needed for Erectile Dysfunction.    co-enzyme Q-10 30 mg capsule Take 30 mg by mouth once daily.    docusate sodium (COLACE) 100 MG capsule Take 200 mg by mouth 2 (two) times daily as needed.    losartan (COZAAR) 25 MG tablet Take 1 tablet (25 mg total) by mouth once daily.    sildenafiL (VIAGRA) 50 MG tablet Take 50 mg by mouth daily as needed. (Patient not taking: Reported on 10/16/2024)    traMADoL (ULTRAM) 50 mg tablet Take 50 mg by mouth every 6 (six) hours.       Review of patient's allergies indicates:  No Known Allergies    Past Medical History:   Diagnosis Date    Anticoagulant long-term use     Arthritis     Carotid artery disease     Diabetes mellitus     Dialysis patient     [facility] FRESENIUS- L UPPER ARM AV SHUNT    Gout, unspecified     Hypertension     Renal disorder     Stroke 2016    TIA    TIA (transient ischemic attack) 10/04/2019     Past Surgical History:   Procedure Laterality Date    AV FISTULA PLACEMENT Left     FACETECTOMY OF VERTEBRA Right 9/26/2022    Procedure: FACETECTOMY, PARTIAL MEDIAL;  Surgeon: Tom Sun MD;  Location: [facility] OR;  Service: Orthopedics;  Laterality: Right;    FORAMINOTOMY Right 9/26/2022    Procedure: FORAMINOTOMY, SPINE;  Surgeon: Tom Sun MD;  Location: [facility] OR;  Service: Orthopedics;  Laterality: Right;    HERNIA REPAIR      LAPAROSCOPIC INSERTION OF PERITONEAL DIALYSIS CATHETER      LEFT HEART CATHETERIZATION Left 2/27/2023    Procedure: Left heart cath;  Surgeon: Yohan Evangelista MD;  Location: [facility] CATH;  Service: Cardiology;  Laterality: Left;    LUMBAR LAMINECTOMY WITH DISCECTOMY Right 9/26/2022    Procedure: LAMINECTOMY,  "SPINE, LUMBAR, WITH DISCECTOMY, HEMILAMINECTOMY, RIGHT L4-5;  Surgeon: Tom Sun MD;  Location: [facility] OR;  Service: Orthopedics;  Laterality: Right;    RIGHT HEART CATHETERIZATION Right 2019    Procedure: INSERTION, CATHETER, RIGHT HEART;  Surgeon: Cinthia Coley MD;  Location: [facility] CATH LAB;  Service: Cardiology;  Laterality: Right;     Family History       Problem Relation (Age of Onset)    Hyperlipidemia Mother    Kidney disease Father    Stroke Father          Tobacco Use    Smoking status: Former     Current packs/day: 0.00     Types: Cigarettes     Quit date: 2019     Years since quittin.7    Smokeless tobacco: Never   Substance and Sexual Activity    Alcohol use: Not Currently     Comment: monthly    Drug use: No    Sexual activity: Not on file        Review of Systems   Constitutional:  Negative for activity change, chills and fever.   HENT: Negative.     Eyes: Negative.    Respiratory:  Negative for shortness of breath.    Cardiovascular:  Negative for chest pain and leg swelling.   Gastrointestinal:  Positive for abdominal distention. Negative for abdominal pain, constipation, diarrhea, nausea and vomiting.   Endocrine: Negative.    Genitourinary:  Positive for decreased urine volume. Negative for difficulty urinating, dysuria and hematuria.   Musculoskeletal:  Negative for arthralgias and myalgias.   Skin:  Negative for wound.   Neurological:  Negative for dizziness, weakness and light-headedness.   Hematological: Negative.    Psychiatric/Behavioral:  Negative for behavioral problems and confusion. The patient is not nervous/anxious.      Objective:     Vital Signs (Most Recent):  Pulse: (!) 55 (10/16/24 08)  Resp: 18 (10/16/24 0816)  BP: (!) 154/70 (10/16/24 0816)  Height: 5' 7" (170.2 cm)  Weight: 87.6 kg (193 lb 2 oz)  Body mass index is 30.25 kg/m².      Physical Exam  Vitals and nursing note reviewed.   Constitutional:       General: He is not in acute distress.     Appearance: " Normal appearance. He is normal weight. He is not ill-appearing.   Cardiovascular:      Rate and Rhythm: Normal rate.      Pulses: Normal pulses.   Pulmonary:      Effort: Pulmonary effort is normal.   Abdominal:      General: Bowel sounds are normal.      Palpations: Abdomen is soft.      Tenderness: There is no abdominal tenderness.   Musculoskeletal:         General: Normal range of motion.      Cervical back: Normal range of motion.      Right lower leg: No edema.      Left lower leg: No edema.      Comments: +/+ LUE AV fistula   Skin:     General: Skin is warm and dry.   Neurological:      General: No focal deficit present.      Mental Status: He is alert and oriented to person, place, and time. Mental status is at baseline.      Motor: No weakness.   Psychiatric:         Mood and Affect: Mood normal.         Behavior: Behavior normal.         Thought Content: Thought content normal.         Judgment: Judgment normal.          Pre op labs and imaging pending.   Assessment/Plan:     Renal/  * ESRD (end stage renal disease)  - ESRD 2/2 DM and HTN  - Admit for kidney transplant surgery with Dr. Diane  - Pre op labs and diagnostic studies pending, to be reviewed before surgery      Cardiac/Vascular  Benign hypertension with ESRD (end-stage renal disease)  - hold meds pre-op and restart slowly post-op to avoid hypotension      Hematology  Long term (current) use of anticoagulants--Eliquis  - h/o CVA in 2016 suspect related to afib, on apixaban  - Last dose apixaban 10/15 in AM  - Hold apixaban pre-op and restart post-op once appropriate      Oncology  Anemia of chronic disease  - chronic related to kidney disease  - Monitor H/H closely post-op      Endocrine  Controlled type 2 diabetes mellitus with kidney complication, without long-term current use of insulin  - consult endocrine for DM management.         The patient presents for kidney transplant.  There are no apparent contraindications to proceeding with  the planned transplant.  The patient understands that the transplant could potentially be cancelled pending detailed assessment of the donor organ.  He will receive Thymoglobulin induction.  A complete discussion of the transplant procedure, including risks, complications, and alternatives, as well as any donor-specific risk factors requiring specific disclosure, will be carried out by the responsible staff surgeon prior to the procedure.     Discharge Planning: Not a candidate for d/c at this time.     Medical decision making for this encounter includes review of pertinent labs and diagnostic studies, assessment and planning, discussions with consulting providers, discussion with patient/family, and participation in multidisciplinary rounds. Time spent caring for patient: 60 minutes    Vicki Franco PA-C  Kidney Transplant  [facility] - Transplant [facility]

## 2024-10-17 ENCOUNTER — TELEPHONE (OUTPATIENT)
Dept: TRANSPLANT | Facility: CLINIC | Age: 59
End: 2024-10-17
Payer: COMMERCIAL

## 2024-10-17 PROBLEM — N18.6 ESRD (END STAGE RENAL DISEASE): Status: RESOLVED | Noted: 2023-07-16 | Resolved: 2024-10-17

## 2024-10-17 PROBLEM — Z79.60 LONG-TERM USE OF IMMUNOSUPPRESSANT MEDICATION: Status: ACTIVE | Noted: 2024-10-17

## 2024-10-17 PROBLEM — Z29.89 PROPHYLACTIC IMMUNOTHERAPY: Status: ACTIVE | Noted: 2024-10-17

## 2024-10-17 PROBLEM — Z94.0 STATUS POST DECEASED-DONOR KIDNEY TRANSPLANTATION: Status: ACTIVE | Noted: 2024-10-17

## 2024-10-17 PROBLEM — Z91.89 AT RISK FOR OPPORTUNISTIC INFECTIONS: Status: ACTIVE | Noted: 2024-10-17

## 2024-10-17 LAB
ALBUMIN SERPL BCP-MCNC: 3.2 G/DL (ref 3.5–5.2)
ALBUMIN SERPL BCP-MCNC: 3.2 G/DL (ref 3.5–5.2)
ALBUMIN SERPL BCP-MCNC: 3.3 G/DL (ref 3.5–5.2)
ANION GAP SERPL CALC-SCNC: 15 MMOL/L (ref 8–16)
ANION GAP SERPL CALC-SCNC: 16 MMOL/L (ref 8–16)
ANION GAP SERPL CALC-SCNC: 17 MMOL/L (ref 8–16)
BASOPHILS # BLD AUTO: 0 K/UL (ref 0–0.2)
BASOPHILS # BLD AUTO: 0 K/UL (ref 0–0.2)
BASOPHILS NFR BLD: 0 % (ref 0–1.9)
BASOPHILS NFR BLD: 0 % (ref 0–1.9)
BUN SERPL-MCNC: 48 MG/DL (ref 6–20)
BUN SERPL-MCNC: 48 MG/DL (ref 6–20)
BUN SERPL-MCNC: 49 MG/DL (ref 6–20)
CALCIUM SERPL-MCNC: 8 MG/DL (ref 8.7–10.5)
CALCIUM SERPL-MCNC: 8.3 MG/DL (ref 8.7–10.5)
CALCIUM SERPL-MCNC: 8.5 MG/DL (ref 8.7–10.5)
CHLORIDE SERPL-SCNC: 100 MMOL/L (ref 95–110)
CHLORIDE SERPL-SCNC: 100 MMOL/L (ref 95–110)
CHLORIDE SERPL-SCNC: 99 MMOL/L (ref 95–110)
CO2 SERPL-SCNC: 22 MMOL/L (ref 23–29)
CO2 SERPL-SCNC: 23 MMOL/L (ref 23–29)
CO2 SERPL-SCNC: 23 MMOL/L (ref 23–29)
CREAT SERPL-MCNC: 10.7 MG/DL (ref 0.5–1.4)
CREAT SERPL-MCNC: 10.7 MG/DL (ref 0.5–1.4)
CREAT SERPL-MCNC: 10.8 MG/DL (ref 0.5–1.4)
DIFFERENTIAL METHOD BLD: ABNORMAL
DIFFERENTIAL METHOD BLD: ABNORMAL
EOSINOPHIL # BLD AUTO: 0 K/UL (ref 0–0.5)
EOSINOPHIL # BLD AUTO: 0 K/UL (ref 0–0.5)
EOSINOPHIL NFR BLD: 0 % (ref 0–8)
EOSINOPHIL NFR BLD: 0 % (ref 0–8)
ERYTHROCYTE [DISTWIDTH] IN BLOOD BY AUTOMATED COUNT: 16.7 % (ref 11.5–14.5)
ERYTHROCYTE [DISTWIDTH] IN BLOOD BY AUTOMATED COUNT: 17 % (ref 11.5–14.5)
EST. GFR  (NO RACE VARIABLE): 5 ML/MIN/1.73 M^2
EST. GFR  (NO RACE VARIABLE): 5.1 ML/MIN/1.73 M^2
EST. GFR  (NO RACE VARIABLE): 5.1 ML/MIN/1.73 M^2
GLUCOSE SERPL-MCNC: 108 MG/DL (ref 70–110)
GLUCOSE SERPL-MCNC: 126 MG/DL (ref 70–110)
GLUCOSE SERPL-MCNC: 163 MG/DL (ref 70–110)
GLUCOSE SERPL-MCNC: 87 MG/DL (ref 70–110)
HCO3 UR-SCNC: 25.3 MMOL/L (ref 24–28)
HCT VFR BLD AUTO: 29.7 % (ref 40–54)
HCT VFR BLD AUTO: 29.7 % (ref 40–54)
HCT VFR BLD AUTO: 30.1 % (ref 40–54)
HCT VFR BLD CALC: 32 %PCV (ref 36–54)
HGB BLD-MCNC: 9.4 G/DL (ref 14–18)
HGB BLD-MCNC: 9.4 G/DL (ref 14–18)
IMM GRANULOCYTES # BLD AUTO: 0.01 K/UL (ref 0–0.04)
IMM GRANULOCYTES # BLD AUTO: 0.06 K/UL (ref 0–0.04)
IMM GRANULOCYTES NFR BLD AUTO: 0.3 % (ref 0–0.5)
IMM GRANULOCYTES NFR BLD AUTO: 0.7 % (ref 0–0.5)
LYMPHOCYTES # BLD AUTO: 0 K/UL (ref 1–4.8)
LYMPHOCYTES # BLD AUTO: 0 K/UL (ref 1–4.8)
LYMPHOCYTES NFR BLD: 0.4 % (ref 18–48)
LYMPHOCYTES NFR BLD: 0.6 % (ref 18–48)
MAGNESIUM SERPL-MCNC: 2.1 MG/DL (ref 1.6–2.6)
MCH RBC QN AUTO: 26.6 PG (ref 27–31)
MCH RBC QN AUTO: 27 PG (ref 27–31)
MCHC RBC AUTO-ENTMCNC: 31.6 G/DL (ref 32–36)
MCHC RBC AUTO-ENTMCNC: 31.6 G/DL (ref 32–36)
MCV RBC AUTO: 84 FL (ref 82–98)
MCV RBC AUTO: 85 FL (ref 82–98)
MONOCYTES # BLD AUTO: 0 K/UL (ref 0.3–1)
MONOCYTES # BLD AUTO: 0.4 K/UL (ref 0.3–1)
MONOCYTES NFR BLD: 0.9 % (ref 4–15)
MONOCYTES NFR BLD: 4.6 % (ref 4–15)
NEUTROPHILS # BLD AUTO: 3.2 K/UL (ref 1.8–7.7)
NEUTROPHILS # BLD AUTO: 7.8 K/UL (ref 1.8–7.7)
NEUTROPHILS NFR BLD: 94.3 % (ref 38–73)
NEUTROPHILS NFR BLD: 98.2 % (ref 38–73)
NRBC BLD-RTO: 0 /100 WBC
NRBC BLD-RTO: 0 /100 WBC
PCO2 BLDA: 39.8 MMHG (ref 35–45)
PH SMN: 7.41 [PH] (ref 7.35–7.45)
PHOSPHATE SERPL-MCNC: 2.7 MG/DL (ref 2.7–4.5)
PHOSPHATE SERPL-MCNC: 2.9 MG/DL (ref 2.7–4.5)
PHOSPHATE SERPL-MCNC: 3.1 MG/DL (ref 2.7–4.5)
PLATELET # BLD AUTO: 104 K/UL (ref 150–450)
PLATELET # BLD AUTO: 127 K/UL (ref 150–450)
PMV BLD AUTO: 9.6 FL (ref 9.2–12.9)
PMV BLD AUTO: 9.9 FL (ref 9.2–12.9)
PO2 BLDA: 123 MMHG (ref 80–100)
POC BE: 1 MMOL/L
POC IONIZED CALCIUM: 1.01 MMOL/L (ref 1.06–1.42)
POC SATURATED O2: 99 % (ref 95–100)
POC TCO2: 26 MMOL/L (ref 23–27)
POCT GLUCOSE: 132 MG/DL (ref 70–110)
POCT GLUCOSE: 151 MG/DL (ref 70–110)
POCT GLUCOSE: 166 MG/DL (ref 70–110)
POCT GLUCOSE: 176 MG/DL (ref 70–110)
POCT GLUCOSE: 94 MG/DL (ref 70–110)
POTASSIUM BLD-SCNC: 4.1 MMOL/L (ref 3.5–5.1)
POTASSIUM SERPL-SCNC: 4.3 MMOL/L (ref 3.5–5.1)
POTASSIUM SERPL-SCNC: 4.4 MMOL/L (ref 3.5–5.1)
POTASSIUM SERPL-SCNC: 4.5 MMOL/L (ref 3.5–5.1)
RBC # BLD AUTO: 3.48 M/UL (ref 4.6–6.2)
RBC # BLD AUTO: 3.53 M/UL (ref 4.6–6.2)
SAMPLE: ABNORMAL
SODIUM BLD-SCNC: 136 MMOL/L (ref 136–145)
SODIUM SERPL-SCNC: 138 MMOL/L (ref 136–145)
SODIUM SERPL-SCNC: 138 MMOL/L (ref 136–145)
SODIUM SERPL-SCNC: 139 MMOL/L (ref 136–145)
TACROLIMUS BLD-MCNC: <2 NG/ML (ref 5–15)
WBC # BLD AUTO: 3.22 K/UL (ref 3.9–12.7)
WBC # BLD AUTO: 8.29 K/UL (ref 3.9–12.7)

## 2024-10-17 PROCEDURE — 81300002 HC KIDNEY TRANSPORT, GROUND 4-5 HOURS

## 2024-10-17 PROCEDURE — 83735 ASSAY OF MAGNESIUM: CPT

## 2024-10-17 PROCEDURE — 99233 SBSQ HOSP IP/OBS HIGH 50: CPT | Mod: 24,,, | Performed by: CLINICAL NURSE SPECIALIST

## 2024-10-17 PROCEDURE — 25000003 PHARM REV CODE 250: Mod: NTX | Performed by: PHYSICIAN ASSISTANT

## 2024-10-17 PROCEDURE — 63600175 PHARM REV CODE 636 W HCPCS

## 2024-10-17 PROCEDURE — 85025 COMPLETE CBC W/AUTO DIFF WBC: CPT

## 2024-10-17 PROCEDURE — 80197 ASSAY OF TACROLIMUS: CPT

## 2024-10-17 PROCEDURE — 25000003 PHARM REV CODE 250: Performed by: PHYSICIAN ASSISTANT

## 2024-10-17 PROCEDURE — 25000003 PHARM REV CODE 250

## 2024-10-17 PROCEDURE — 85025 COMPLETE CBC W/AUTO DIFF WBC: CPT | Mod: 91 | Performed by: CLINICAL NURSE SPECIALIST

## 2024-10-17 PROCEDURE — 20600001 HC STEP DOWN PRIVATE ROOM

## 2024-10-17 PROCEDURE — 85014 HEMATOCRIT: CPT

## 2024-10-17 PROCEDURE — 63600175 PHARM REV CODE 636 W HCPCS: Performed by: NURSE ANESTHETIST, CERTIFIED REGISTERED

## 2024-10-17 PROCEDURE — 81200001 HC KIDNEY ACQUISITION - CADAVER

## 2024-10-17 PROCEDURE — 25000003 PHARM REV CODE 250: Performed by: NURSE ANESTHETIST, CERTIFIED REGISTERED

## 2024-10-17 PROCEDURE — S5010 5% DEXTROSE AND 0.45% SALINE: HCPCS

## 2024-10-17 PROCEDURE — 25000003 PHARM REV CODE 250: Performed by: CLINICAL NURSE SPECIALIST

## 2024-10-17 PROCEDURE — 63600175 PHARM REV CODE 636 W HCPCS: Performed by: CLINICAL NURSE SPECIALIST

## 2024-10-17 PROCEDURE — 94799 UNLISTED PULMONARY SVC/PX: CPT

## 2024-10-17 PROCEDURE — 99900035 HC TECH TIME PER 15 MIN (STAT)

## 2024-10-17 PROCEDURE — 63600175 PHARM REV CODE 636 W HCPCS: Performed by: NURSE PRACTITIONER

## 2024-10-17 PROCEDURE — 99232 SBSQ HOSP IP/OBS MODERATE 35: CPT | Mod: ,,, | Performed by: NURSE PRACTITIONER

## 2024-10-17 PROCEDURE — 94761 N-INVAS EAR/PLS OXIMETRY MLT: CPT

## 2024-10-17 PROCEDURE — 63600175 PHARM REV CODE 636 W HCPCS: Mod: NTX | Performed by: PHYSICIAN ASSISTANT

## 2024-10-17 PROCEDURE — 36415 COLL VENOUS BLD VENIPUNCTURE: CPT | Performed by: CLINICAL NURSE SPECIALIST

## 2024-10-17 PROCEDURE — 27000207 HC ISOLATION

## 2024-10-17 PROCEDURE — 80069 RENAL FUNCTION PANEL: CPT | Mod: 91 | Performed by: CLINICAL NURSE SPECIALIST

## 2024-10-17 PROCEDURE — 63600175 PHARM REV CODE 636 W HCPCS: Performed by: TRANSPLANT SURGERY

## 2024-10-17 PROCEDURE — 80069 RENAL FUNCTION PANEL: CPT

## 2024-10-17 RX ORDER — CARVEDILOL 25 MG/1
25 TABLET ORAL 2 TIMES DAILY WITH MEALS
Status: DISCONTINUED | OUTPATIENT
Start: 2024-10-18 | End: 2024-10-18

## 2024-10-17 RX ORDER — CARVEDILOL 6.25 MG/1
12.5 TABLET ORAL 2 TIMES DAILY WITH MEALS
Status: DISCONTINUED | OUTPATIENT
Start: 2024-10-17 | End: 2024-10-17

## 2024-10-17 RX ORDER — PREDNISONE 5 MG/1
TABLET ORAL
Qty: 70 TABLET | Refills: 11 | Status: SHIPPED | OUTPATIENT
Start: 2024-10-17

## 2024-10-17 RX ORDER — FAMOTIDINE 20 MG/1
20 TABLET, FILM COATED ORAL NIGHTLY
Status: DISCONTINUED | OUTPATIENT
Start: 2024-10-17 | End: 2024-10-19 | Stop reason: HOSPADM

## 2024-10-17 RX ORDER — GLUCAGON 1 MG
1 KIT INJECTION CONTINUOUS PRN
Status: DISCONTINUED | OUTPATIENT
Start: 2024-10-17 | End: 2024-10-17

## 2024-10-17 RX ORDER — SODIUM CHLORIDE 0.9 % (FLUSH) 0.9 %
10 SYRINGE (ML) INJECTION
Status: DISCONTINUED | OUTPATIENT
Start: 2024-10-17 | End: 2024-10-19 | Stop reason: HOSPADM

## 2024-10-17 RX ORDER — NIFEDIPINE 30 MG/1
30 TABLET, EXTENDED RELEASE ORAL DAILY
Status: DISCONTINUED | OUTPATIENT
Start: 2024-10-17 | End: 2024-10-17

## 2024-10-17 RX ORDER — FAMOTIDINE 20 MG/1
20 TABLET, FILM COATED ORAL DAILY
Qty: 30 TABLET | Refills: 0 | Status: SHIPPED | OUTPATIENT
Start: 2024-10-17

## 2024-10-17 RX ORDER — MANNITOL 250 MG/ML
INJECTION, SOLUTION INTRAVENOUS
Status: DISCONTINUED | OUTPATIENT
Start: 2024-10-17 | End: 2024-10-17

## 2024-10-17 RX ORDER — HYDRALAZINE HYDROCHLORIDE 20 MG/ML
10 INJECTION INTRAMUSCULAR; INTRAVENOUS EVERY 8 HOURS PRN
Status: DISCONTINUED | OUTPATIENT
Start: 2024-10-17 | End: 2024-10-19 | Stop reason: HOSPADM

## 2024-10-17 RX ORDER — FENTANYL CITRATE 50 UG/ML
25 INJECTION, SOLUTION INTRAMUSCULAR; INTRAVENOUS EVERY 5 MIN PRN
Status: DISCONTINUED | OUTPATIENT
Start: 2024-10-17 | End: 2024-10-17 | Stop reason: HOSPADM

## 2024-10-17 RX ORDER — HEPARIN SODIUM 1000 [USP'U]/ML
INJECTION, SOLUTION INTRAVENOUS; SUBCUTANEOUS
Status: DISCONTINUED | OUTPATIENT
Start: 2024-10-17 | End: 2024-10-17 | Stop reason: HOSPADM

## 2024-10-17 RX ORDER — OXYBUTYNIN CHLORIDE 5 MG/1
5 TABLET ORAL 3 TIMES DAILY
Status: DISCONTINUED | OUTPATIENT
Start: 2024-10-17 | End: 2024-10-19 | Stop reason: HOSPADM

## 2024-10-17 RX ORDER — CARVEDILOL 6.25 MG/1
12.5 TABLET ORAL ONCE
Status: COMPLETED | OUTPATIENT
Start: 2024-10-17 | End: 2024-10-17

## 2024-10-17 RX ORDER — NICARDIPINE HYDROCHLORIDE 0.2 MG/ML
INJECTION INTRAVENOUS CONTINUOUS PRN
Status: DISCONTINUED | OUTPATIENT
Start: 2024-10-17 | End: 2024-10-17

## 2024-10-17 RX ORDER — HYDRALAZINE HYDROCHLORIDE 20 MG/ML
INJECTION INTRAMUSCULAR; INTRAVENOUS
Status: COMPLETED
Start: 2024-10-17 | End: 2024-10-17

## 2024-10-17 RX ORDER — SULFAMETHOXAZOLE AND TRIMETHOPRIM 400; 80 MG/1; MG/1
1 TABLET ORAL DAILY
Qty: 30 TABLET | Refills: 5 | Status: SHIPPED | OUTPATIENT
Start: 2024-10-17 | End: 2024-10-19

## 2024-10-17 RX ORDER — CLONIDINE HYDROCHLORIDE 0.1 MG/1
0.1 TABLET ORAL 3 TIMES DAILY PRN
Status: DISCONTINUED | OUTPATIENT
Start: 2024-10-17 | End: 2024-10-18

## 2024-10-17 RX ORDER — GLUCAGON 1 MG
1 KIT INJECTION
Status: DISCONTINUED | OUTPATIENT
Start: 2024-10-17 | End: 2024-10-17 | Stop reason: HOSPADM

## 2024-10-17 RX ORDER — FENTANYL CITRATE 50 UG/ML
INJECTION, SOLUTION INTRAMUSCULAR; INTRAVENOUS
Status: DISCONTINUED | OUTPATIENT
Start: 2024-10-16 | End: 2024-10-17

## 2024-10-17 RX ORDER — SULFAMETHOXAZOLE AND TRIMETHOPRIM 400; 80 MG/1; MG/1
1 TABLET ORAL EVERY MORNING
Status: DISCONTINUED | OUTPATIENT
Start: 2024-10-27 | End: 2024-10-19 | Stop reason: HOSPADM

## 2024-10-17 RX ORDER — MUPIROCIN 20 MG/G
1 OINTMENT TOPICAL 2 TIMES DAILY
Status: DISCONTINUED | OUTPATIENT
Start: 2024-10-17 | End: 2024-10-19 | Stop reason: HOSPADM

## 2024-10-17 RX ORDER — HYDROMORPHONE HYDROCHLORIDE 1 MG/ML
0.5 INJECTION, SOLUTION INTRAMUSCULAR; INTRAVENOUS; SUBCUTANEOUS EVERY 6 HOURS PRN
Status: DISCONTINUED | OUTPATIENT
Start: 2024-10-17 | End: 2024-10-18

## 2024-10-17 RX ORDER — PREDNISONE 20 MG/1
20 TABLET ORAL DAILY
Status: DISCONTINUED | OUTPATIENT
Start: 2024-10-20 | End: 2024-10-19 | Stop reason: HOSPADM

## 2024-10-17 RX ORDER — ONDANSETRON HYDROCHLORIDE 2 MG/ML
INJECTION, SOLUTION INTRAVENOUS
Status: DISCONTINUED | OUTPATIENT
Start: 2024-10-17 | End: 2024-10-17

## 2024-10-17 RX ORDER — METHYLPREDNISOLONE SOD SUCC 125 MG
250 VIAL (EA) INJECTION ONCE
Status: COMPLETED | OUTPATIENT
Start: 2024-10-18 | End: 2024-10-18

## 2024-10-17 RX ORDER — SODIUM CHLORIDE 0.9 % (FLUSH) 0.9 %
3 SYRINGE (ML) INJECTION
Status: DISCONTINUED | OUTPATIENT
Start: 2024-10-17 | End: 2024-10-17 | Stop reason: HOSPADM

## 2024-10-17 RX ORDER — OXYCODONE HYDROCHLORIDE 10 MG/1
10 TABLET ORAL EVERY 4 HOURS PRN
Status: DISCONTINUED | OUTPATIENT
Start: 2024-10-17 | End: 2024-10-19 | Stop reason: HOSPADM

## 2024-10-17 RX ORDER — IBUPROFEN 200 MG
16 TABLET ORAL
Status: DISCONTINUED | OUTPATIENT
Start: 2024-10-17 | End: 2024-10-19 | Stop reason: HOSPADM

## 2024-10-17 RX ORDER — PROCHLORPERAZINE EDISYLATE 5 MG/ML
2.5 INJECTION INTRAMUSCULAR; INTRAVENOUS EVERY 6 HOURS PRN
Status: DISCONTINUED | OUTPATIENT
Start: 2024-10-17 | End: 2024-10-19 | Stop reason: HOSPADM

## 2024-10-17 RX ORDER — HYDRALAZINE HYDROCHLORIDE 20 MG/ML
10 INJECTION INTRAMUSCULAR; INTRAVENOUS EVERY 6 HOURS PRN
Status: DISCONTINUED | OUTPATIENT
Start: 2024-10-17 | End: 2024-10-17

## 2024-10-17 RX ORDER — INSULIN ASPART 100 [IU]/ML
0-5 INJECTION, SOLUTION INTRAVENOUS; SUBCUTANEOUS
Status: DISCONTINUED | OUTPATIENT
Start: 2024-10-17 | End: 2024-10-19 | Stop reason: HOSPADM

## 2024-10-17 RX ORDER — SODIUM CHLORIDE 9 MG/ML
INJECTION, SOLUTION INTRAVENOUS CONTINUOUS
Status: DISCONTINUED | OUTPATIENT
Start: 2024-10-17 | End: 2024-10-17

## 2024-10-17 RX ORDER — TACROLIMUS 1 MG/1
3 CAPSULE ORAL 2 TIMES DAILY
Status: DISCONTINUED | OUTPATIENT
Start: 2024-10-17 | End: 2024-10-18

## 2024-10-17 RX ORDER — OXYCODONE HYDROCHLORIDE 5 MG/1
5 TABLET ORAL EVERY 6 HOURS PRN
Status: DISCONTINUED | OUTPATIENT
Start: 2024-10-17 | End: 2024-10-17

## 2024-10-17 RX ORDER — DOCUSATE SODIUM 100 MG/1
100 CAPSULE, LIQUID FILLED ORAL 3 TIMES DAILY
Status: DISCONTINUED | OUTPATIENT
Start: 2024-10-17 | End: 2024-10-19 | Stop reason: HOSPADM

## 2024-10-17 RX ORDER — NIFEDIPINE 30 MG/1
30 TABLET, EXTENDED RELEASE ORAL 2 TIMES DAILY
Status: DISCONTINUED | OUTPATIENT
Start: 2024-10-17 | End: 2024-10-17

## 2024-10-17 RX ORDER — BISACODYL 5 MG
10 TABLET, DELAYED RELEASE (ENTERIC COATED) ORAL NIGHTLY
Status: DISCONTINUED | OUTPATIENT
Start: 2024-10-17 | End: 2024-10-19 | Stop reason: HOSPADM

## 2024-10-17 RX ORDER — TRAMADOL HYDROCHLORIDE 50 MG/1
50 TABLET ORAL EVERY 6 HOURS PRN
Status: DISCONTINUED | OUTPATIENT
Start: 2024-10-17 | End: 2024-10-17

## 2024-10-17 RX ORDER — ONDANSETRON HYDROCHLORIDE 2 MG/ML
4 INJECTION, SOLUTION INTRAVENOUS EVERY 6 HOURS PRN
Status: DISCONTINUED | OUTPATIENT
Start: 2024-10-17 | End: 2024-10-19 | Stop reason: HOSPADM

## 2024-10-17 RX ORDER — HYDROMORPHONE HYDROCHLORIDE 1 MG/ML
0.2 INJECTION, SOLUTION INTRAMUSCULAR; INTRAVENOUS; SUBCUTANEOUS ONCE
Status: COMPLETED | OUTPATIENT
Start: 2024-10-17 | End: 2024-10-17

## 2024-10-17 RX ORDER — FUROSEMIDE 10 MG/ML
INJECTION INTRAMUSCULAR; INTRAVENOUS
Status: DISCONTINUED | OUTPATIENT
Start: 2024-10-17 | End: 2024-10-17

## 2024-10-17 RX ORDER — MYCOPHENOLATE MOFETIL 250 MG/1
1000 CAPSULE ORAL 2 TIMES DAILY
Qty: 240 CAPSULE | Refills: 2 | Status: SHIPPED | OUTPATIENT
Start: 2024-10-17

## 2024-10-17 RX ORDER — SODIUM CHLORIDE 9 MG/ML
INJECTION, SOLUTION INTRAVENOUS CONTINUOUS
Status: ACTIVE | OUTPATIENT
Start: 2024-10-17 | End: 2024-10-18

## 2024-10-17 RX ORDER — VALGANCICLOVIR 450 MG/1
900 TABLET, FILM COATED ORAL DAILY
Qty: 60 TABLET | Refills: 2 | Status: SHIPPED | OUTPATIENT
Start: 2024-10-17 | End: 2024-10-19

## 2024-10-17 RX ORDER — HYDROMORPHONE HYDROCHLORIDE 1 MG/ML
INJECTION, SOLUTION INTRAMUSCULAR; INTRAVENOUS; SUBCUTANEOUS
Status: COMPLETED
Start: 2024-10-17 | End: 2024-10-17

## 2024-10-17 RX ORDER — MYCOPHENOLATE MOFETIL 250 MG/1
1000 CAPSULE ORAL 2 TIMES DAILY
Status: DISCONTINUED | OUTPATIENT
Start: 2024-10-17 | End: 2024-10-19 | Stop reason: HOSPADM

## 2024-10-17 RX ORDER — DEXTROSE MONOHYDRATE AND SODIUM CHLORIDE 5; .45 G/100ML; G/100ML
INJECTION, SOLUTION INTRAVENOUS CONTINUOUS
Status: DISCONTINUED | OUTPATIENT
Start: 2024-10-17 | End: 2024-10-17

## 2024-10-17 RX ORDER — METHOCARBAMOL 500 MG/1
500 TABLET, FILM COATED ORAL 3 TIMES DAILY
Status: DISCONTINUED | OUTPATIENT
Start: 2024-10-17 | End: 2024-10-19 | Stop reason: HOSPADM

## 2024-10-17 RX ORDER — CLONIDINE HYDROCHLORIDE 0.1 MG/1
0.1 TABLET ORAL NIGHTLY
Status: DISCONTINUED | OUTPATIENT
Start: 2024-10-17 | End: 2024-10-17

## 2024-10-17 RX ORDER — SODIUM CHLORIDE 0.9 % (FLUSH) 0.9 %
10 SYRINGE (ML) INJECTION
Status: DISCONTINUED | OUTPATIENT
Start: 2024-10-17 | End: 2024-10-17 | Stop reason: HOSPADM

## 2024-10-17 RX ORDER — TACROLIMUS 1 MG/1
6 CAPSULE ORAL EVERY 12 HOURS
Qty: 360 CAPSULE | Refills: 11 | Status: SHIPPED | OUTPATIENT
Start: 2024-10-17 | End: 2024-10-19

## 2024-10-17 RX ORDER — HEPARIN SODIUM 5000 [USP'U]/ML
5000 INJECTION, SOLUTION INTRAVENOUS; SUBCUTANEOUS EVERY 8 HOURS
Status: DISCONTINUED | OUTPATIENT
Start: 2024-10-17 | End: 2024-10-19 | Stop reason: HOSPADM

## 2024-10-17 RX ORDER — METHYLPREDNISOLONE SOD SUCC 125 MG
125 VIAL (EA) INJECTION ONCE
Status: COMPLETED | OUTPATIENT
Start: 2024-10-19 | End: 2024-10-19

## 2024-10-17 RX ORDER — CEFAZOLIN SODIUM 1 G/3ML
INJECTION, POWDER, FOR SOLUTION INTRAMUSCULAR; INTRAVENOUS
Status: DISCONTINUED | OUTPATIENT
Start: 2024-10-17 | End: 2024-10-17 | Stop reason: HOSPADM

## 2024-10-17 RX ORDER — HYDROMORPHONE HYDROCHLORIDE 1 MG/ML
0.2 INJECTION, SOLUTION INTRAMUSCULAR; INTRAVENOUS; SUBCUTANEOUS EVERY 5 MIN PRN
Status: DISCONTINUED | OUTPATIENT
Start: 2024-10-17 | End: 2024-10-17 | Stop reason: HOSPADM

## 2024-10-17 RX ORDER — IBUPROFEN 200 MG
24 TABLET ORAL
Status: DISCONTINUED | OUTPATIENT
Start: 2024-10-17 | End: 2024-10-19 | Stop reason: HOSPADM

## 2024-10-17 RX ORDER — ACETAMINOPHEN 325 MG/1
650 TABLET ORAL EVERY 8 HOURS
Status: DISPENSED | OUTPATIENT
Start: 2024-10-17 | End: 2024-10-19

## 2024-10-17 RX ORDER — HALOPERIDOL 5 MG/ML
0.5 INJECTION INTRAMUSCULAR EVERY 10 MIN PRN
Status: DISCONTINUED | OUTPATIENT
Start: 2024-10-17 | End: 2024-10-17 | Stop reason: HOSPADM

## 2024-10-17 RX ORDER — VALGANCICLOVIR 450 MG/1
450 TABLET, FILM COATED ORAL EVERY MORNING
Status: DISCONTINUED | OUTPATIENT
Start: 2024-10-27 | End: 2024-10-19 | Stop reason: HOSPADM

## 2024-10-17 RX ORDER — CEFAZOLIN 2 G/1
2 INJECTION, POWDER, FOR SOLUTION INTRAMUSCULAR; INTRAVENOUS
Status: COMPLETED | OUTPATIENT
Start: 2024-10-17 | End: 2024-10-17

## 2024-10-17 RX ORDER — OXYCODONE HYDROCHLORIDE 5 MG/1
5 TABLET ORAL EVERY 4 HOURS PRN
Status: DISCONTINUED | OUTPATIENT
Start: 2024-10-17 | End: 2024-10-19 | Stop reason: HOSPADM

## 2024-10-17 RX ORDER — CARVEDILOL 3.12 MG/1
3.12 TABLET ORAL 2 TIMES DAILY
Status: DISCONTINUED | OUTPATIENT
Start: 2024-10-17 | End: 2024-10-17

## 2024-10-17 RX ORDER — NIFEDIPINE 30 MG/1
60 TABLET, EXTENDED RELEASE ORAL 2 TIMES DAILY
Status: DISCONTINUED | OUTPATIENT
Start: 2024-10-17 | End: 2024-10-19 | Stop reason: HOSPADM

## 2024-10-17 RX ADMIN — ROCURONIUM BROMIDE 20 MG: 10 INJECTION INTRAVENOUS at 01:10

## 2024-10-17 RX ADMIN — OXYBUTYNIN CHLORIDE 5 MG: 5 TABLET ORAL at 08:10

## 2024-10-17 RX ADMIN — NIFEDIPINE 30 MG: 30 TABLET, FILM COATED, EXTENDED RELEASE ORAL at 08:10

## 2024-10-17 RX ADMIN — ONDANSETRON 4 MG: 2 INJECTION INTRAMUSCULAR; INTRAVENOUS at 02:10

## 2024-10-17 RX ADMIN — CLONIDINE HYDROCHLORIDE 0.1 MG: 0.1 TABLET ORAL at 03:10

## 2024-10-17 RX ADMIN — METHOCARBAMOL 500 MG: 500 TABLET ORAL at 08:10

## 2024-10-17 RX ADMIN — MYCOPHENOLATE MOFETIL 1000 MG: 250 CAPSULE ORAL at 10:10

## 2024-10-17 RX ADMIN — HALOPERIDOL LACTATE 0.5 MG: 5 INJECTION, SOLUTION INTRAMUSCULAR at 04:10

## 2024-10-17 RX ADMIN — MUPIROCIN 1 G: 20 OINTMENT TOPICAL at 10:10

## 2024-10-17 RX ADMIN — HYDROMORPHONE HYDROCHLORIDE 0.2 MG: 1 INJECTION, SOLUTION INTRAMUSCULAR; INTRAVENOUS; SUBCUTANEOUS at 08:10

## 2024-10-17 RX ADMIN — CEFAZOLIN 2 G: 2 INJECTION, POWDER, FOR SOLUTION INTRAMUSCULAR; INTRAVENOUS at 11:10

## 2024-10-17 RX ADMIN — HYDROMORPHONE HYDROCHLORIDE 0.2 MG: 1 INJECTION, SOLUTION INTRAMUSCULAR; INTRAVENOUS; SUBCUTANEOUS at 04:10

## 2024-10-17 RX ADMIN — OXYCODONE 5 MG: 5 TABLET ORAL at 04:10

## 2024-10-17 RX ADMIN — MYCOPHENOLATE MOFETIL 1000 MG: 250 CAPSULE ORAL at 08:10

## 2024-10-17 RX ADMIN — ANTI-THYMOCYTE GLOBULIN (RABBIT) 100 MG: 5 INJECTION, POWDER, LYOPHILIZED, FOR SOLUTION INTRAVENOUS at 12:10

## 2024-10-17 RX ADMIN — ACETAMINOPHEN 650 MG: 325 TABLET ORAL at 05:10

## 2024-10-17 RX ADMIN — FENTANYL CITRATE 50 MCG: 50 INJECTION, SOLUTION INTRAMUSCULAR; INTRAVENOUS at 12:10

## 2024-10-17 RX ADMIN — NIFEDIPINE 60 MG: 30 TABLET, FILM COATED, EXTENDED RELEASE ORAL at 08:10

## 2024-10-17 RX ADMIN — DEXTROSE AND SODIUM CHLORIDE: 5; 450 INJECTION, SOLUTION INTRAVENOUS at 04:10

## 2024-10-17 RX ADMIN — FENTANYL CITRATE 50 MCG: 50 INJECTION, SOLUTION INTRAMUSCULAR; INTRAVENOUS at 03:10

## 2024-10-17 RX ADMIN — OXYCODONE HYDROCHLORIDE 10 MG: 10 TABLET ORAL at 01:10

## 2024-10-17 RX ADMIN — DOCUSATE SODIUM 100 MG: 100 CAPSULE, LIQUID FILLED ORAL at 03:10

## 2024-10-17 RX ADMIN — SUGAMMADEX 400 MG: 100 INJECTION, SOLUTION INTRAVENOUS at 03:10

## 2024-10-17 RX ADMIN — METHOCARBAMOL 500 MG: 500 TABLET ORAL at 10:10

## 2024-10-17 RX ADMIN — BISACODYL 10 MG: 5 TABLET, COATED ORAL at 08:10

## 2024-10-17 RX ADMIN — TACROLIMUS 3 MG: 1 CAPSULE ORAL at 08:10

## 2024-10-17 RX ADMIN — TACROLIMUS 3 MG: 1 CAPSULE ORAL at 05:10

## 2024-10-17 RX ADMIN — HYDRALAZINE HYDROCHLORIDE 10 MG: 20 INJECTION, SOLUTION INTRAMUSCULAR; INTRAVENOUS at 04:10

## 2024-10-17 RX ADMIN — CARVEDILOL 12.5 MG: 6.25 TABLET, FILM COATED ORAL at 06:10

## 2024-10-17 RX ADMIN — CEFAZOLIN 2 G: 2 INJECTION, POWDER, FOR SOLUTION INTRAMUSCULAR; INTRAVENOUS at 06:10

## 2024-10-17 RX ADMIN — DOCUSATE SODIUM 100 MG: 100 CAPSULE, LIQUID FILLED ORAL at 08:10

## 2024-10-17 RX ADMIN — ACETAMINOPHEN 650 MG: 325 TABLET ORAL at 08:10

## 2024-10-17 RX ADMIN — CARVEDILOL 12.5 MG: 6.25 TABLET, FILM COATED ORAL at 05:10

## 2024-10-17 RX ADMIN — HEPARIN SODIUM 5000 UNITS: 5000 INJECTION INTRAVENOUS; SUBCUTANEOUS at 01:10

## 2024-10-17 RX ADMIN — DOCUSATE SODIUM 100 MG: 100 CAPSULE, LIQUID FILLED ORAL at 10:10

## 2024-10-17 RX ADMIN — ROCURONIUM BROMIDE 20 MG: 10 INJECTION INTRAVENOUS at 12:10

## 2024-10-17 RX ADMIN — ACETAMINOPHEN 650 MG: 325 TABLET ORAL at 01:10

## 2024-10-17 RX ADMIN — MANNITOL 25 G: 12.5 INJECTION, SOLUTION INTRAVENOUS at 01:10

## 2024-10-17 RX ADMIN — MUPIROCIN 1 G: 20 OINTMENT TOPICAL at 08:10

## 2024-10-17 RX ADMIN — CLONIDINE HYDROCHLORIDE 0.1 MG: 0.1 TABLET ORAL at 08:10

## 2024-10-17 RX ADMIN — HEPARIN SODIUM 5000 UNITS: 5000 INJECTION INTRAVENOUS; SUBCUTANEOUS at 08:10

## 2024-10-17 RX ADMIN — CARVEDILOL 12.5 MG: 6.25 TABLET, FILM COATED ORAL at 08:10

## 2024-10-17 RX ADMIN — HYDRALAZINE HYDROCHLORIDE 10 MG: 20 INJECTION INTRAMUSCULAR; INTRAVENOUS at 11:10

## 2024-10-17 RX ADMIN — SODIUM CHLORIDE: 9 INJECTION, SOLUTION INTRAVENOUS at 11:10

## 2024-10-17 RX ADMIN — PROCHLORPERAZINE EDISYLATE 2.5 MG: 5 INJECTION INTRAMUSCULAR; INTRAVENOUS at 05:10

## 2024-10-17 RX ADMIN — FAMOTIDINE 20 MG: 20 TABLET ORAL at 04:10

## 2024-10-17 RX ADMIN — METHOCARBAMOL 500 MG: 500 TABLET ORAL at 03:10

## 2024-10-17 RX ADMIN — THERA TABS 1 TABLET: TAB at 10:10

## 2024-10-17 RX ADMIN — ROCURONIUM BROMIDE 20 MG: 10 INJECTION INTRAVENOUS at 02:10

## 2024-10-17 RX ADMIN — HYDRALAZINE HYDROCHLORIDE 10 MG: 20 INJECTION INTRAMUSCULAR; INTRAVENOUS at 04:10

## 2024-10-17 RX ADMIN — NICARDIPINE HYDROCHLORIDE 2.5 MG/HR: 0.2 INJECTION, SOLUTION INTRAVENOUS at 12:10

## 2024-10-17 RX ADMIN — OXYBUTYNIN CHLORIDE 5 MG: 5 TABLET ORAL at 03:10

## 2024-10-17 RX ADMIN — CEFAZOLIN 2 G: 330 INJECTION, POWDER, FOR SOLUTION INTRAMUSCULAR; INTRAVENOUS at 03:10

## 2024-10-17 RX ADMIN — ONDANSETRON 4 MG: 2 INJECTION INTRAMUSCULAR; INTRAVENOUS at 08:10

## 2024-10-17 RX ADMIN — HEPARIN SODIUM 5000 UNITS: 5000 INJECTION INTRAVENOUS; SUBCUTANEOUS at 05:10

## 2024-10-17 RX ADMIN — SODIUM CHLORIDE 10 ML/HR: 9 INJECTION, SOLUTION INTRAVENOUS at 04:10

## 2024-10-17 RX ADMIN — FUROSEMIDE 100 MG: 10 INJECTION, SOLUTION INTRAMUSCULAR; INTRAVENOUS at 01:10

## 2024-10-17 RX ADMIN — SODIUM CHLORIDE: 9 INJECTION, SOLUTION INTRAVENOUS at 02:10

## 2024-10-17 RX ADMIN — FAMOTIDINE 20 MG: 20 TABLET ORAL at 08:10

## 2024-10-17 RX ADMIN — OXYCODONE HYDROCHLORIDE 10 MG: 10 TABLET ORAL at 08:10

## 2024-10-17 NOTE — PROGRESS NOTES
Notified Kanu Moreira NP of patient reporting 9/10 pain to RLQ and moaning.  NP to bedside to examine patient.  New orders received.  Will continue to monitor patient.

## 2024-10-17 NOTE — ASSESSMENT & PLAN NOTE
- Opportunistic infection prophylaxis will include Valcyte for 3 months (CMV D + , R + ) and Bactrim for 6 months

## 2024-10-17 NOTE — SUBJECTIVE & OBJECTIVE
Subjective:   History of Present Illness:  Chidi Lisa is a 58 yr AAM with ESRD secondary to diabetic nephropathy. Other PMH includes CVA 2016 suspect related to afib (on apixaban), back surgery 2022, and CAD. He has been on the waitlist for a kidney transplant at [facility] since 1/17/2020. He initially started on HD then changed to PD, then switched back to HD 5/2023. He dialyzes on a TTS schedule, last HD 10/15. He reports that he is tolerating HD well. He has a LUE AV fistula. DW 87.5 kg. Native uop < 1 cups per day. He is being admitted for kidney transplant surgery. He feels well in his usual state of health. He denies any recent illnesses or hospitalizations. Pre op labs and diagnostic studies pending, to be reviewed before surgery.       Hospital Course:  Patient now s/p DBD Ktxp 10/17/24 (out OR 4AM) for DM. (CIT 10h 53m, CMV + +, KDPI 50%.) Per op note, bladder was small and the mucosa very thin. During the suture a 50% tear was identified and the anastomosis was repeated, this time taking mucosa and muscle in one layer. A superficial muscle layer was created using 6-0 PDS but it was not antireflux procedure. 10 day Arreola. 1 drain.     Interval note:  - Pt out OR 4 AM. Making excellent urine, ~ 300 cc/hr. Post-op labs stable. CECILIA drain w/ mild serosang output  - Uncontrolled pain this morning, adjusted pain regimen. Added robaxin. Pain improving  - Hypertensive. Several home meds restarted. Adjust as needed  - Encourage OOBTC later today and oral hydration.       Past Medical, Surgical, Family, and Social History:   Unchanged from H&P.    Scheduled Meds:   acetaminophen  650 mg Oral Q8H    bisacodyL  10 mg Oral QHS    carvediloL  12.5 mg Oral BID WM    ceFAZolin (Ancef) IV (PEDS and ADULTS)  2 g Intravenous Q8H    docusate sodium  100 mg Oral TID    famotidine  20 mg Oral QHS    heparin (porcine)  5,000 Units Subcutaneous Q8H    methocarbamoL  500 mg Oral TID    [START ON 10/19/2024] methylPREDNISolone  injection (PEDS and ADULTS)  125 mg Intravenous Once    [START ON 10/18/2024] methylPREDNISolone injection (PEDS and ADULTS)  250 mg Intravenous Once    multivitamin  1 tablet Oral Daily    mupirocin  1 g Nasal BID    mycophenolate  1,000 mg Oral BID    NIFEdipine  30 mg Oral BID    oxybutynin  5 mg Oral TID    [START ON 10/20/2024] predniSONE  20 mg Oral Daily    [START ON 10/27/2024] sulfamethoxazole-trimethoprim 400-80mg  1 tablet Oral Daily AM    tacrolimus  3 mg Oral BID    [START ON 10/27/2024] valGANciclovir  450 mg Oral Daily AM     Continuous Infusions:   0.9% NaCl   Intravenous Continuous 250 mL/hr at 10/17/24 1112 New Bag at 10/17/24 1112    D5 and 0.45% NaCl   Intravenous Continuous 50 mL/hr at 10/17/24 0500 Rate Verify at 10/17/24 0500     PRN Meds:  Current Facility-Administered Medications:     cloNIDine, 0.1 mg, Oral, TID PRN    dextrose 10%, 12.5 g, Intravenous, PRN    dextrose 10%, 25 g, Intravenous, PRN    glucagon (human recombinant), 1 mg, Intramuscular, PRN    glucose, 16 g, Oral, PRN    glucose, 16 g, Oral, PRN    glucose, 24 g, Oral, PRN    hydrALAZINE, 10 mg, Intravenous, Q8H PRN    HYDROmorphone, 0.5 mg, Intravenous, Q6H PRN    insulin aspart U-100, 0-5 Units, Subcutaneous, QID (AC + HS) PRN    ondansetron, 4 mg, Intravenous, Q6H PRN    oxyCODONE, 5 mg, Oral, Q4H PRN    oxyCODONE, 10 mg, Oral, Q4H PRN    sodium chloride 0.9%, 10 mL, Intravenous, PRN    Intake/Output - Last 3 Shifts         10/15 0700  10/16 0659 10/16 0700  10/17 0659 10/17 0700  10/18 0659    P.O.  320 240    I.V. (mL/kg)  54.8 (0.6)     IV Piggyback  2500     Total Intake(mL/kg)  2874.8 (32.8) 240 (2.7)    Urine (mL/kg/hr)  470 375 (0.9)    Drains  38     Total Output  508 375    Net  +2366.8 -135                    Review of Systems   Constitutional:  Negative for activity change, chills and fever.   HENT: Negative.     Eyes: Negative.    Respiratory:  Negative for shortness of breath.    Cardiovascular:  Negative for  "chest pain and leg swelling.   Gastrointestinal:  Positive for abdominal pain (incisional). Negative for constipation, diarrhea, nausea and vomiting.   Endocrine: Negative.    Genitourinary:  Negative for difficulty urinating, dysuria and hematuria.   Musculoskeletal:  Negative for arthralgias and myalgias.   Skin:  Negative for wound.   Allergic/Immunologic: Positive for immunocompromised state.   Neurological:  Negative for dizziness, weakness and light-headedness.   Hematological: Negative.    Psychiatric/Behavioral:  Negative for behavioral problems and confusion. The patient is not nervous/anxious.       Objective:     Vital Signs (Most Recent):  Temp: 98.9 °F (37.2 °C) (10/17/24 1110)  Pulse: 66 (10/17/24 1110)  Resp: (!) 21 (10/17/24 0813)  BP: (!) 168/74 (10/17/24 0741)  SpO2: 99 % (10/17/24 1110) Vital Signs (24h Range):  Temp:  [98 °F (36.7 °C)-98.9 °F (37.2 °C)] 98.9 °F (37.2 °C)  Pulse:  [55-87] 66  Resp:  [16-23] 21  SpO2:  [92 %-100 %] 99 %  BP: (136-184)/(58-93) 168/74  Arterial Line BP: (142-172)/(40-53) 162/43     Weight: 87.6 kg (193 lb 2 oz)  Height: 5' 7" (170.2 cm)  Body mass index is 30.25 kg/m².     Physical Exam  Vitals and nursing note reviewed.   Constitutional:       General: He is not in acute distress.     Appearance: Normal appearance. He is normal weight. He is not ill-appearing.   Cardiovascular:      Rate and Rhythm: Normal rate.      Pulses: Normal pulses.   Pulmonary:      Effort: Pulmonary effort is normal.   Abdominal:      General: Bowel sounds are normal.      Palpations: Abdomen is soft.      Tenderness: There is abdominal tenderness (incisional).      Comments: RLQ kidney txp incision. Covered w/ gauze dressing, C/D/I    RLQ CECILIA drain with mild serosang output   Musculoskeletal:         General: Normal range of motion.      Cervical back: Normal range of motion.      Right lower leg: No edema.      Left lower leg: No edema.      Comments: +/+ LUE AV fistula   Skin:     " General: Skin is warm and dry.   Neurological:      General: No focal deficit present.      Mental Status: He is alert and oriented to person, place, and time. Mental status is at baseline.      Motor: No weakness.   Psychiatric:         Mood and Affect: Mood normal.         Behavior: Behavior normal.         Thought Content: Thought content normal.         Judgment: Judgment normal.          Laboratory:  CBC:   Recent Labs   Lab 10/16/24  0815 10/17/24  0215 10/17/24  0401 10/17/24  0455 10/17/24  1036   WBC 3.82*  --   --  3.22* 8.29   RBC 3.86*  --   --  3.53* 3.48*   HGB 10.2*  --   --  9.4* 9.4*   HCT 32.7*   < > 30.1* 29.7* 29.7*     --   --  104* 127*   MCV 85  --   --  84 85   MCH 26.4*  --   --  26.6* 27.0   MCHC 31.2*  --   --  31.6* 31.6*    < > = values in this interval not displayed.     CMP:   Recent Labs   Lab 10/16/24  0816 10/17/24  0401 10/17/24  0455 10/17/24  1036   GLU 88 108 126* 163*   CALCIUM 9.5 8.3* 8.5* 8.0*   ALBUMIN 3.8 3.2* 3.3* 3.2*   PROT 7.2  --   --   --     139 138 138   K 4.4 4.5 4.3 4.4   CO2 28 23 22* 23   CL 98 100 99 100   BUN 40* 49* 48* 48*   CREATININE 9.6* 10.7* 10.8* 10.7*   ALKPHOS 80  --   --   --    ALT 9*  --   --   --    AST 18  --   --   --        Diagnostic Results:  None

## 2024-10-17 NOTE — PROGRESS NOTES
Transplant  Admit Note     SW met with patient and wife to assess needs. Patient is a 58 y.o.  male, admitted for for kidney transplant.      Patient admitted from home on 10/16/2024 .  At this time, patient presents as alert and oriented x 4, pleasant, good eye contact, well groomed, recall good, concentration/judgement good, average intelligence, calm, communicative, cooperative, and asking and answering questions appropriately.  At this time, patients caregiver presents as alert and oriented x 4, pleasant, good eye contact, well groomed, recall good, concentration/judgement good, average intelligence, calm, communicative, cooperative, and asking and answering questions appropriately.    Household/Family Systems     Patient resides with patient's wife, at [address] [place] [address].  Support system includes pt's wife, step-daughters Delicia Morocho ([phone]) and Tatiana Morocho ([phone]), and pt's friend Fidel Millan ([phone]).  Patient does not have dependents that are need of being cared for.     Patients primary caregiver is Ashley Lisa, patients wife, phone number [phone].  Confirmed patients contact information is [phone] (home);   Telephone Information:   Mobile [phone]   .    During admission, patient's caregiver plans to stay at home.  Confirmed patient and patients caregivers do have access to reliable transportation.    Cognitive Status/Learning     Patient reports reading ability as college and states patient does not have difficulty with N/A.  Patient reports patient learns best by multisensory information.   Needed: No.   Highest education level: Attended College/Technical School    Vocation/Disability   .  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    Adherence     Patient reports a high level of adherence to patients health care  regimen.  Adherence counseling and education provided. Patient verbalizes understanding.    Substance Use    Patient reports the following substance usage.    Tobacco: none, patient denies any use.  Alcohol: none, patient denies any use.  Illicit Drugs/Non-prescribed Medications: none, patient denies any use.  Patient states clear understanding of the potential impact of substance use.  Substance abstinence/cessation counseling, education and resources provided and reviewed.     Services Utilizing/ADLS    Infusion Service: Prior to admission, patient utilizing? no  Home Health: Prior to admission, patient utilizing? no  DME: Prior to admission, yes - BP cuff; glucometer  Pulmonary/Cardiac Rehab: Prior to admission, no  Dialysis:  Prior to admission, yes - in-center HD at [facility] TTS  Transplant Specialty Pharmacy:  Prior to admission, no; patient provides permission to release necessary information to specialty pharmacy for medications post-tranplant. Resources provided and patient is choosing [facility] pharmacy at this time.    Prior to admission, patient reports patient was independent with ADLS and was driving.  Patient reports patient is not able to care for self at this time due to compromised medical condition (as documented in medical record) and physical weakness..  Patient indicates a willingness to care for self once medically cleared to do so.    Insurance/Medications    Insured by   Payer/Plan Subscr  Sex Relation Sub. Ins. ID Effective Group Num   1. MEDICARE - ME* YARELY EARL* 1965 Male Self [id number]                                    [address]   2. BLUE CROSS BL* YARELY EARL* 1965 Male Self [id number]                                   [address] [address]      Primary Insurance (for UNOS reporting): Public Insurance - Medicare FFS (Fee For Service)  Secondary Insurance (for UNOS reporting): Private Insurance    Patient reports patient  is able to obtain and afford medications at this time and at time of discharge.    Living Will/Healthcare Power of     Patient states patient does not have a LW and/or HCPA.   provided education regarding LW and HCPA and the completion of forms.    Coping/Mental Health    Patient is coping adequately with the aid of  family members.  Patient denies mental health difficulties.     Discharge Planning    At time of discharge, patient plans to discharge to [facility] apartments under the care of wife.  Patients wife will transport patient.  Per rounds today, expected discharge date has not been medically determined at this time. Patient and patients caregiver  verbalize understanding and are involved in treatment planning and discharge process.    Additional Concerns    Patient's caretaker denies additional needs and/or concerns at this time. Patient is being followed for needs, education, resources, information, emotional support, supportive counseling, and for supportive and skilled discharge plan of care.  providing ongoing psychosocial support, education, resources and d/c planning as needed.  SW remains available.  remains available. Patient's caregiver verbalizes understanding and agreement with information reviewed,  availability and how to access available resources as needed. Patient denies additional needs and/or concerns at this time. Patient verbalizes understanding and agreement with information reviewed, social work availability, and how to access available resources as needed.

## 2024-10-17 NOTE — PROGRESS NOTES
[facility] - Transplant Stepdown  Kidney Transplant  Progress Note      Reason for Follow-up: Reassessment of Kidney Transplant - 10/17/2024  (#1) recipient and management of immunosuppression.    ORGAN:   RIGHT KIDNEY    Donor Type:   Donation after Brain Death    Donor CMV Status: Positive  Donor HBcAB:Negative  Donor HCV Status:Negative  Donor HBV PRISCILLA:   Donor HCV PRISCILLA: Negative, Negative      Subjective:   History of Present Illness:  Chidi Lisa is a 58 yr AAM with ESRD secondary to diabetic nephropathy. Other PMH includes CVA 2016 suspect related to afib (on apixaban), back surgery 2022, and CAD. He has been on the waitlist for a kidney transplant at [facility] since 1/17/2020. He initially started on HD then changed to PD, then switched back to HD 5/2023. He dialyzes on a TTS schedule, last HD 10/15. He reports that he is tolerating HD well. He has a LUE AV fistula. DW 87.5 kg. Native uop < 1 cups per day. He is being admitted for kidney transplant surgery. He feels well in his usual state of health. He denies any recent illnesses or hospitalizations. Pre op labs and diagnostic studies pending, to be reviewed before surgery.       Hospital Course:  Patient now s/p DBD Ktxp 10/17/24 (out OR 4AM) for DM. (CIT 10h 53m, CMV + +, KDPI 50%.) Per op note, bladder was small and the mucosa very thin. During the suture a 50% tear was identified and the anastomosis was repeated, this time taking mucosa and muscle in one layer. A superficial muscle layer was created using 6-0 PDS but it was not antireflux procedure. 10 day Arreola. 1 drain.     Interval note:  - Pt out OR 4 AM. Making excellent urine, ~ 300 cc/hr. Post-op labs stable. CECILIA drain w/ mild serosang output  - Uncontrolled pain this morning, adjusted pain regimen. Added robaxin. Pain improving  - Hypertensive. Several home meds restarted. Adjust as needed  - Encourage OOBTC later today and oral hydration.       Past Medical, Surgical, Family, and Social History:    Unchanged from H&P.    Scheduled Meds:   acetaminophen  650 mg Oral Q8H    bisacodyL  10 mg Oral QHS    carvediloL  12.5 mg Oral BID WM    ceFAZolin (Ancef) IV (PEDS and ADULTS)  2 g Intravenous Q8H    docusate sodium  100 mg Oral TID    famotidine  20 mg Oral QHS    heparin (porcine)  5,000 Units Subcutaneous Q8H    methocarbamoL  500 mg Oral TID    [START ON 10/19/2024] methylPREDNISolone injection (PEDS and ADULTS)  125 mg Intravenous Once    [START ON 10/18/2024] methylPREDNISolone injection (PEDS and ADULTS)  250 mg Intravenous Once    multivitamin  1 tablet Oral Daily    mupirocin  1 g Nasal BID    mycophenolate  1,000 mg Oral BID    NIFEdipine  30 mg Oral BID    oxybutynin  5 mg Oral TID    [START ON 10/20/2024] predniSONE  20 mg Oral Daily    [START ON 10/27/2024] sulfamethoxazole-trimethoprim 400-80mg  1 tablet Oral Daily AM    tacrolimus  3 mg Oral BID    [START ON 10/27/2024] valGANciclovir  450 mg Oral Daily AM     Continuous Infusions:   0.9% NaCl   Intravenous Continuous 250 mL/hr at 10/17/24 1112 New Bag at 10/17/24 1112    D5 and 0.45% NaCl   Intravenous Continuous 50 mL/hr at 10/17/24 0500 Rate Verify at 10/17/24 0500     PRN Meds:  Current Facility-Administered Medications:     cloNIDine, 0.1 mg, Oral, TID PRN    dextrose 10%, 12.5 g, Intravenous, PRN    dextrose 10%, 25 g, Intravenous, PRN    glucagon (human recombinant), 1 mg, Intramuscular, PRN    glucose, 16 g, Oral, PRN    glucose, 16 g, Oral, PRN    glucose, 24 g, Oral, PRN    hydrALAZINE, 10 mg, Intravenous, Q8H PRN    HYDROmorphone, 0.5 mg, Intravenous, Q6H PRN    insulin aspart U-100, 0-5 Units, Subcutaneous, QID (AC + HS) PRN    ondansetron, 4 mg, Intravenous, Q6H PRN    oxyCODONE, 5 mg, Oral, Q4H PRN    oxyCODONE, 10 mg, Oral, Q4H PRN    sodium chloride 0.9%, 10 mL, Intravenous, PRN    Intake/Output - Last 3 Shifts         10/15 0700  10/16 0659 10/16 0700  10/17 0659 10/17 0700  10/18 0659    P.O.  320 240    I.V. (mL/kg)  54.8 (0.6)   "   IV Piggyback  2500     Total Intake(mL/kg)  2874.8 (32.8) 240 (2.7)    Urine (mL/kg/hr)  470 375 (0.9)    Drains  38     Total Output  508 375    Net  +2366.8 -135                    Review of Systems   Constitutional:  Negative for activity change, chills and fever.   HENT: Negative.     Eyes: Negative.    Respiratory:  Negative for shortness of breath.    Cardiovascular:  Negative for chest pain and leg swelling.   Gastrointestinal:  Positive for abdominal pain (incisional). Negative for constipation, diarrhea, nausea and vomiting.   Endocrine: Negative.    Genitourinary:  Negative for difficulty urinating, dysuria and hematuria.   Musculoskeletal:  Negative for arthralgias and myalgias.   Skin:  Negative for wound.   Allergic/Immunologic: Positive for immunocompromised state.   Neurological:  Negative for dizziness, weakness and light-headedness.   Hematological: Negative.    Psychiatric/Behavioral:  Negative for behavioral problems and confusion. The patient is not nervous/anxious.       Objective:     Vital Signs (Most Recent):  Temp: 98.9 °F (37.2 °C) (10/17/24 1110)  Pulse: 66 (10/17/24 1110)  Resp: (!) 21 (10/17/24 0813)  BP: (!) 168/74 (10/17/24 0741)  SpO2: 99 % (10/17/24 1110) Vital Signs (24h Range):  Temp:  [98 °F (36.7 °C)-98.9 °F (37.2 °C)] 98.9 °F (37.2 °C)  Pulse:  [55-87] 66  Resp:  [16-23] 21  SpO2:  [92 %-100 %] 99 %  BP: (136-184)/(58-93) 168/74  Arterial Line BP: (142-172)/(40-53) 162/43     Weight: 87.6 kg (193 lb 2 oz)  Height: 5' 7" (170.2 cm)  Body mass index is 30.25 kg/m².     Physical Exam  Vitals and nursing note reviewed.   Constitutional:       General: He is not in acute distress.     Appearance: Normal appearance. He is normal weight. He is not ill-appearing.   Cardiovascular:      Rate and Rhythm: Normal rate.      Pulses: Normal pulses.   Pulmonary:      Effort: Pulmonary effort is normal.   Abdominal:      General: Bowel sounds are normal.      Palpations: Abdomen is soft.     "  Tenderness: There is abdominal tenderness (incisional).      Comments: RLQ kidney txp incision. Covered w/ gauze dressing, C/D/I    RLQ CECILIA drain with mild serosang output   Musculoskeletal:         General: Normal range of motion.      Cervical back: Normal range of motion.      Right lower leg: No edema.      Left lower leg: No edema.      Comments: +/+ LUE AV fistula   Skin:     General: Skin is warm and dry.   Neurological:      General: No focal deficit present.      Mental Status: He is alert and oriented to person, place, and time. Mental status is at baseline.      Motor: No weakness.   Psychiatric:         Mood and Affect: Mood normal.         Behavior: Behavior normal.         Thought Content: Thought content normal.         Judgment: Judgment normal.          Laboratory:  CBC:   Recent Labs   Lab 10/16/24  0815 10/17/24  0215 10/17/24  0401 10/17/24  0455 10/17/24  1036   WBC 3.82*  --   --  3.22* 8.29   RBC 3.86*  --   --  3.53* 3.48*   HGB 10.2*  --   --  9.4* 9.4*   HCT 32.7*   < > 30.1* 29.7* 29.7*     --   --  104* 127*   MCV 85  --   --  84 85   MCH 26.4*  --   --  26.6* 27.0   MCHC 31.2*  --   --  31.6* 31.6*    < > = values in this interval not displayed.     CMP:   Recent Labs   Lab 10/16/24  0816 10/17/24  0401 10/17/24  0455 10/17/24  1036   GLU 88 108 126* 163*   CALCIUM 9.5 8.3* 8.5* 8.0*   ALBUMIN 3.8 3.2* 3.3* 3.2*   PROT 7.2  --   --   --     139 138 138   K 4.4 4.5 4.3 4.4   CO2 28 23 22* 23   CL 98 100 99 100   BUN 40* 49* 48* 48*   CREATININE 9.6* 10.7* 10.8* 10.7*   ALKPHOS 80  --   --   --    ALT 9*  --   --   --    AST 18  --   --   --        Diagnostic Results:  None  Assessment/Plan:     * Status post -donor kidney transplantation  S/p DBD Ktxp 10/17/24 (out OR 4AM) for DM. (CIT 10h 53m, CMV + +, KDPI 50%.) bladder was small and the mucosa very thin. During the suture a 50% tear was identified and the anastomosis was repeated, this time taking mucosa and muscle  "in one layer. A superficial muscle layer was created using 6-0 PDS but it was not antireflux procedure. 10 day Arreola. 1 drain.     - Excellent urine output post-op. Labs stable  - Encourage OOBTC, ambulate  - Encourage oral hydration  - Pain and bowel regimen ordered  - Avoid nephrotoxic meds  - Daily RFP    At risk for opportunistic infections  - Opportunistic infection prophylaxis will include Valcyte for 3 months (CMV D + , R + ) and Bactrim for 6 months       Long-term use of immunosuppressant medication  - see "prophylactic immunotherapy"      Prophylactic immunotherapy  - Continue prograf, cellcept, and steroid taper  - Check prograf level daily and adjust for therapeutic dosage. Monitor for toxic side effects       Anemia of chronic disease  - chronic related to kidney disease  - Monitor H/H daily post-op    Long term (current) use of anticoagulants--Eliquis  - h/o CVA in 2016 suspect related to afib, on apixaban  - Last dose apixaban 10/15 in AM  - Eliquis on hold for now d/t recent surgery    Benign hypertension with ESRD (end-stage renal disease)  - home carvedilol, nifedipine, and prn clonidine restarted with hold parameters. Adjust regimen as needed    Controlled type 2 diabetes mellitus with kidney complication, without long-term current use of insulin  - consult endocrine, appreciate assistance        Discharge Planning:  Not suitable for discharge at this time    Medical decision making for this encounter includes review of pertinent labs and diagnostic studies, assessment and planning, discussions with consulting providers, discussion with patient/family, and participation in multidisciplinary rounds. Time spent caring for patient: 60 minutes    Michel Moreira NP  Kidney Transplant  [facility] - Transplant Stepdown  "

## 2024-10-17 NOTE — ASSESSMENT & PLAN NOTE
BG goal 140-180    Low Dose Correction scale  BG monitoring ac/hs   Will monitor for prandial excursions and consider scheduled Novolog if noted    ** Please call Endocrine for any BG related issues **    Lab Results   Component Value Date    HGBA1C 4.8 10/16/2024

## 2024-10-17 NOTE — ASSESSMENT & PLAN NOTE
- h/o CVA in 2016 suspect related to afib, on apixaban  - Last dose apixaban 10/15 in AM  - Eliquis on hold for now d/t recent surgery

## 2024-10-17 NOTE — CONSULTS
[facility] - Transplant Stepdown  Adult Nutrition  Consult Note    SUMMARY     Recommendations    1.) Recommend continuing with renal diet as tolerated, fluid per MD.      - may liberalize diet to Low Na/Diabetic diet as renal labs normalize.     2.) If PO intake <50%, recommend Novasource Renal BID to help meet needs.     3.) RD to monitor wt, PO intake, skin, labs.       Goals: to meet % of EEN/EPN by next RD f/u  Nutrition Goal Status: new  Communication of RD Recs:  (POC)    Assessment and Plan    Nutrition Problem  Limited food and nutrition related knowledge    Related to (etiology):   S/p DBD kidney tx    Signs and Symptoms (as evidenced by):   Lack of prior knowledge to Food Safety diet edu    Interventions/Recommendations (treatment strategy):  Collaboration of nutritional care with other providers  Diet edu    Nutrition Diagnosis Status:   New     Reason for Assessment    Reason For Assessment: consult (s/p DBD kidney tx)  Diagnosis: renal disease (s/p DBD kidney tx)    General Information Comments: RD consulted for s/p DBD kidney tx. Pt was resting comfortably, sleeping all day, per pt's caregiver. Pt was not able to consume much of their meals, 0-25%.  Pt appears to be nourished, with no s/s of malnutrition. NFPE not warranted. RD was able to provide Food Safety diet edu to caregiver in the room (pt's wife). All questions and concerns were addressed. RD's contact information was provided. RD team to continue to monitor and f/u.     Nutrition Discharge Planning: Post transplant nutrition education provided on 10/17. Food safety/drug interactions emphasized. General healthy/low salt diet recommended. Education material left at bedside. No other needs identified.    Nutrition Related Social Determinants of Health: SDOH: Adequate food in home environment and None Identified     Nutrition Risk Screen    Nutrition Risk Screen: no indicators present    Nutrition/Diet History    Spiritual, Cultural  "Beliefs, Taoism Practices, Values that Affect Care: no  Food Allergies: NKFA    Anthropometrics    Temp: 98.9 °F (37.2 °C)  Height Method: Stated  Height: 5' 7" (170.2 cm)  Height (inches): 67 in  Weight Method: Standard Scale  Weight: 87.6 kg (193 lb 2 oz)  Weight (lb): 193.12 lb  Ideal Body Weight (IBW), Male: 148 lb  % Ideal Body Weight, Male (lb): 130.49 %  BMI (Calculated): 30.2    Lab/Procedures/Meds    Pertinent Labs Reviewed: reviewed  Pertinent Labs Comments: BUN: 48, Cr: 10.7, GFR: 5.1, gluc: 163    Pertinent Medications Reviewed: reviewed  Pertinent Medications Comments: Bisacodyl, docusate, famotidine, heparin, methylprednisolone, MVI, mycophenolate, prednisone, abx, tacrolimus, NaCl    Estimated/Assessed Needs    Weight Used For Calorie Calculations: 87.6 kg (193 lb 2 oz)  Energy Calorie Requirements (kcal): 1985 kcal  Energy Need Method: [place] (MSJ x 1.2)    Protein Requirements: 88g (1.0g/kg)  Weight Used For Protein Calculations: 87.6 kg (193 lb 2 oz)    Fluid Requirements (mL): as per MD or RDA  Estimated Fluid Requirement Method: RDA Method  RDA Method (mL): 1985    CHO Requirement: 248 g    Nutrition Prescription Ordered    Current Diet Order: Renal    Evaluation of Received Nutrient/Fluid Intake    I/O: +235ml since admit  Fluid Required:  (as per MD)  Comments: LBM 10/15  Tolerance: tolerating  % Intake of Estimated Energy Needs: 0 - 25 %  % Meal Intake: 0 - 25 %    Nutrition Risk    Level of Risk/Frequency of Follow-up:  (RD to f/u x 2 prior to d/c)     Monitor and Evaluation    Food and Nutrient Intake: food and beverage intake  Food and Nutrient Adminstration: diet order  Anthropometric Measurements: weight, weight change, body mass index  Biochemical Data, Medical Tests and Procedures: electrolyte and renal panel, gastrointestinal profile, glucose/endocrine profile, inflammatory profile, lipid profile  Nutrition-Focused Physical Findings: overall appearance, skin     Nutrition " Follow-Up    RD Follow-up?: Yes

## 2024-10-17 NOTE — ASSESSMENT & PLAN NOTE
S/p DBD Ktxp 10/17/24 (out OR 4AM) for DM. (CIT 10h 53m, CMV + +, KDPI 50%.) bladder was small and the mucosa very thin. During the suture a 50% tear was identified and the anastomosis was repeated, this time taking mucosa and muscle in one layer. A superficial muscle layer was created using 6-0 PDS but it was not antireflux procedure. 10 day Arreola. 1 drain.     - Excellent urine output post-op. Labs stable  - Encourage OOBTC, ambulate  - Encourage oral hydration  - Pain and bowel regimen ordered  - Avoid nephrotoxic meds  - Daily RFP

## 2024-10-17 NOTE — SUBJECTIVE & OBJECTIVE
"Interval HPI:   Overnight events:No acute events overnight. Patient in room 59959/35099 A. Blood glucose stable. BG at goal on current insulin regimen (SSI ). Steroid use- Methylprednisolone  500 mg . 1 Day Post-Op  Renal function- Abnormal - Creatinine 10.8.   Vasopressors-  None       Endocrine will continue to follow and manage insulin orders inpatient.         Diet Renal     Eatin%  Nausea: No  Hypoglycemia and intervention: No  Fever: No  TPN and/or TF: No  If yes, type of TF/TPN and rate: n/a    BP (!) 168/74 (BP Location: Right arm, Patient Position: Sitting)   Pulse 87   Temp 98.7 °F (37.1 °C) (Oral)   Resp (!) 21   Ht 5' 7" (1.702 m)   Wt 87.6 kg (193 lb 2 oz)   SpO2 96%   BMI 30.25 kg/m²     Labs Reviewed and Include    Recent Labs   Lab 10/17/24  0455   *   CALCIUM 8.5*   ALBUMIN 3.3*      K 4.3   CO2 22*   CL 99   BUN 48*   CREATININE 10.8*     Lab Results   Component Value Date    WBC 3.22 (L) 10/17/2024    HGB 9.4 (L) 10/17/2024    HCT 29.7 (L) 10/17/2024    MCV 84 10/17/2024     (L) 10/17/2024     No results for input(s): "TSH", "FREET4" in the last 168 hours.  Lab Results   Component Value Date    HGBA1C 4.8 10/16/2024       Nutritional status:   Body mass index is 30.25 kg/m².  Lab Results   Component Value Date    ALBUMIN 3.3 (L) 10/17/2024    ALBUMIN 3.2 (L) 10/17/2024    ALBUMIN 3.8 10/16/2024     No results found for: "PREALBUMIN"    Estimated Creatinine Clearance: 7.9 mL/min (A) (based on SCr of 10.8 mg/dL (H)).    Accu-Checks  Recent Labs     10/16/24  0957 10/16/24  1432 10/16/24  1743 10/16/24  1847 10/16/24  2024 10/17/24  0359 10/17/24  0751   POCTGLUCOSE 101 95 72 90 81 94 166*       Current Medications and/or Treatments Impacting Glycemic Control  Immunotherapy:    Immunosuppressants           Stop Route Frequency     mycophenolate capsule 1,000 mg         -- Oral 2 times daily     tacrolimus capsule 3 mg         -- Oral 2 times daily          Steroids: "   Hormones (From admission, onward)      Start     Stop Route Frequency Ordered    10/20/24 0900  predniSONE tablet 20 mg  (IP TXP KIDNEY POST-OP SIMULECT)         11/16/24 0859 Oral Daily 10/17/24 0358    10/19/24 0900  methylPREDNISolone sodium succinate injection 125 mg  (IP TXP KIDNEY POST-OP SIMULECT)         -- IV Once 10/17/24 0358    10/18/24 0900  methylPREDNISolone sodium succinate injection 250 mg  (IP TXP KIDNEY POST-OP SIMULECT)         -- IV Once 10/17/24 0358          Pressors:    Autonomic Drugs (From admission, onward)      None          Hyperglycemia/Diabetes Medications:   Antihyperglycemics (From admission, onward)      Start     Stop Route Frequency Ordered    10/17/24 0443  insulin aspart U-100 pen 0-5 Units  (INSULIN - LOW CORRECTION DOSE (Recommended for BMI <25, GFR<15 or on dialysis, Age > 70, type 1 diabetes, ESLD, severe CHF or patients on <70 units of insulin daily))         -- SubQ Before meals & nightly PRN 10/17/24 0358

## 2024-10-17 NOTE — PROGRESS NOTES
TRANSPLANT NOTE:      ORGAN:   RIGHT KIDNEY    Disease Etiology: Diabetes Mellitus - Type II  Donor Type:   Donation after Brain Death    CDC High Risk:   No    Donor CMV Status:     Donor HBcAB:   Negative    Donor HCV Status:   Negative    Peak cPRA % (within 1 year of transplant): 0      Chidi Lisa is a 58 y.o. male s/p    Donation after Brain Death   kidney transplant on 10/17/2024 (Kidney) for Diabetes Mellitus - Type II.   This patient will receive 3 doses of Thymoglobulin for induction.  This patients maintenance immunosuppression will include a steroid taper per protocol to 5mg daily, Prograf, and Cellcept maintenance.  Opportunistic infection prophylaxis will include Valcyte for 3 months (CMV D + , R + ) and Bactrim for 6 months.  Patient is to begin self medications upon transfer to the [facility], and I plan to meet with this patient and his/her support person prior to discharge to review the medication section of the Kidney Transplant Education Manual.  I have reviewed the pre-op medications and have restarted those, as appropriate.

## 2024-10-17 NOTE — NURSING TRANSFER
Nursing Transfer Note      10/17/2024   5:41 AM    Nurse giving handoff:Jenna WANG  Nurse receiving handoff:[facility] RN    Reason patient is being transferred: post op    Transfer : [id number]    Transfer via bed    Transfer with IVFs    Transported by transport    Transfer Vital Signs:  Blood Pressure:see flowsheet  Heart Rate:  O2:RA  Temperature:  Respirations:    Telemetry: yes  Order for Tele Monitor? yes    Additional Lines:     Medicines sent: Solumedrol IVPB    Any special needs or follow-up needed: no    Patient belongings transferred with patient: no    Chart send with patient: yes    Notified: spouse in room    Patient reassessed at: 10/17/2024@ 0500 (date  Upon arrival to floor:

## 2024-10-17 NOTE — OP NOTE
Operative Report    Date of Procedure: 10/16/2024  Date of Transplant (UNOS): 10/17/24    Surgeons:  Surgeons and Role:     * Eron Diane MD - Primary     * Andrew Zepeda MD - Assisting     * Margarita Swain MD - Resident - Assisting    First Assistant Attestation:  The presence of an additional attending surgeon functioning as first assistant was required due to the complexity of the procedure relative to any available residents. I certify that no resident was available who was qualified to serve as first assistant. Duties performed by the assistant included assisting the primary surgeon.    Pre-operative Diagnosis: ESRD, requiring chronic dialysis secondary to Diabetes Mellitus - Type II  Post-operative Diagnosis: Same    Procedure(s) Performed:   Back Table Preparation of Right Kidney     Donation after Brain Death Kidney transplant    Anesthesia: General endotracheal    Preamble  Indications and Patient Counseling: The patient is a 58 y.o. year-old male with end-stage kidney disease secondary to Diabetes Mellitus - Type II who has been evaluated for a kidney transplant.  The procedure was thoroughly discussed with the patient, including potential risks, complications, and alternatives.  Specific complications mentioned included death, graft non-function, bleeding, infection, and rejection, as well as the possibility of other complications not specifically mentioned.    Donor Risk Factors: Prior to the operation, the patient was advised of any donor-specific risk factors requiring specific disclosure.  Factors in this case included nothing that required specific disclosure.      Specific PHS donor risk criteria for the organ donor include:  None    All questions were answered, the patient voiced appropriate understanding, and he agreed to proceed with the planned procedure.    ABO Confirmation: Immediately following arrival of the donor organ and prior to implantation, a formal ABO  confirmation was done according to hospital and UNOS policies.  I confirmed the UNOS ID number ([id number]) of the donor organ and the donor and recipient ABO types, directly verifying these data by comparison with the UNOS Match Run report ([id number]).  This confirmation was personally done by an attending surgeon and circulating nurse, and is officially documented elsewhere.    Time-Out: A complete time out was carried out prior to incision, with confirmation of patient identity, correct procedure, correct operative site, appropriate antibiotic prophylaxis, review of any known allergies, and presence of all needed equipment.    Procedure in Detail  Prior to starting the operation, the right kidney  was prepared on the back table. Arterial anatomy was single. Venous anatomy was single. Ureteral anatomy was single. Back table vascular reconstruction was not required  .  Unneeded fat was removed from the kidney, the vessels were cleaned of adherent tissue and tested for leaks, and the kidney was maintained at ice temperature in organ preservation solution until it was brought to the operative field.     The patient was brought into the operating room and placed in a supine position on the OR table.  After the induction of general endotracheal anesthesia, lines were placed by the anesthesiologist.  The urinary bladder was catheterized and irrigated with antibiotic solution.  There was no tension on the axillae and all pressure points were padded.  Sequential compression boots were used as were Suzan Huggers.  The abdomen was prepped and draped in the usual sterile fashion.  Skin was incised over the right with a knife and deepened with electrocautery.  The peritoneum and its contents were swept medially, exposing the right external iliac artery and the right external iliac vein.  The Bookwalter retractor was used to provide exposure.  Overlying lymphatics were ligated or cauterized and the vessels were dissected free for a  length compatible with anastomosis.  The kidney was brought to the OR table at 10/17/2024 12:51 PM.  Venous control was obtained with a vascular clamp.  A venotomy was made, the vein irrigated, and an end renal to right external iliac vein anastomosis was created with 6-0 polypropylene.  Arterial control was obtained with a vascular clamp.  Arteriotomy was made, the artery irrigated, and an end renal to right external iliac artery anastomosis was created with 6-0 polypropylene.  The kidney was unclamped and reperfused at 10/17/2024  1:21 AM.  Reperfusion quality was good. Intraoperative urine production was observed.  After hemostasis was obtained, a Lich uretero-neocystostomy was created.  The bladder was small and the mucosa very thin and tore easily. The bladder was filled and identified, opened, and the anastomosis created using 6-0 PDS. During the suture a 50% tear was identified and the anastomosis was repeated, this time taking mucosa and muscle in one layer. A superficial muscle layer was created using 6-0 PDS but it was not antireflux procedure. The bladder muscle was closed over the distal ureter.  A ureteral stent was used.  With the kidney well perfused and sitting appropriately without tension on the anastomoses, viscera were replaced in their usual position.  The wound was closed after a final check for hemostasis.  Overall, the graft quality was assessed to be good. At the end of the case the needle, sponge and instrument counts were all correct.  Sterile dressings were applied and the patient was brought to the recovery room/ICU in good condition.    Estimated Blood Loss: 50 mL  Fluids Administered:   Crystalloid (mL) 2,500      Drains: 19f Rodrigo drains x 1  Specimens: none    Findings:    Organ Transplanted: Right Kidney    Arterial Anatomy: single  Number of Arteries: 1  Configuration of Multiple Arteries: not applicable  Venous Anatomy: single  Number of Veins: 1  Ureteral Anatomy: single  Number  of Ureters: 1  Reperfusion Quality: good  Overall Graft Quality: good  Intraoperative Urine Production: yes  Arreola: not to be removed before  10  days.  Ureteral Stent: Yes    Ischemic Times:   Anastomosis (warm ischemia) time: 36 minutes   Cold ischemia time: 653 minutes  Total ischemia time: 689 minutes    Donor Data:  UNOS ID:   [id number]  UN Match Run:   [id number]  Donor Type:   Donation after Brain Death  Donor CMV Status:   Positive  Donor HBcAB:   Negative  Donor HCV Status:   Negative

## 2024-10-17 NOTE — TRANSFER OF CARE
"Anesthesia Transfer of Care Note    Patient: Chidi Lisa    Procedure(s) Performed: Procedure(s) (LRB):  TRANSPLANT, KIDNEY (N/A)    Anesthesia Type: general    Transport from OR: Transported from OR on 6-10 L/min O2 by face mask with adequate spontaneous ventilation    Post pain: adequate analgesia    Post assessment: no apparent anesthetic complications and tolerated procedure well    Post vital signs: stable    Level of consciousness: awake    Nausea/Vomiting: no nausea/vomiting    Complications: none    Transfer of care protocol was followed    Last vitals: Visit Vitals  BP (!) 165/58   Pulse 74   Temp 37 °C (98.6 °F) (Temporal)   Resp 16   Ht 5' 7" (1.702 m)   Wt 87.6 kg (193 lb 2 oz)   SpO2 100%   BMI 30.25 kg/m²     "

## 2024-10-17 NOTE — ASSESSMENT & PLAN NOTE
- home carvedilol, nifedipine, and prn clonidine restarted with hold parameters. Adjust regimen as needed

## 2024-10-17 NOTE — PROGRESS NOTES
Notified Kanu Moreira NP of patient's SBP > 190 sitting.  Also called for clarification of fluids.  Fluids changed to NS @ 100cc/hr.  Also received instruction to administer prn Clonidine @ 3pm.  Will continue to monitor patient.

## 2024-10-17 NOTE — PLAN OF CARE
Recommendations     1.) Recommend continuing with renal diet as tolerated, fluid per MD.                  - may liberalize diet to Low Na/Diabetic diet as renal labs normalize.      2.) If PO intake <50%, recommend Novasource Renal BID to help meet needs.      3.) RD to monitor wt, PO intake, skin, labs.         Goals: to meet % of EEN/EPN by next RD f/u  Nutrition Goal Status: new  Communication of RD Recs:  (POC)

## 2024-10-17 NOTE — PROGRESS NOTES
"[facility] - [facility]plant Stepdown  Endocrinology  Progress Note    Admit Date: 10/16/2024     Reason for Consult: Management of T2DM, Hyperglycemia     Surgical Procedure and Date: tentative plans for kidney transplant on 10/16/24    Diabetes diagnosis year:     Home Diabetes Medications:  Actos 30 mg daily     Lab Results   Component Value Date    HGBA1C 5.2 2023       How often checking glucose at home?  Daily     BG readings on regimen:   Hypoglycemia on the regimen?  No  Missed doses on regimen?  No    Diabetes Complications include:     Diabetic nephropathy   and Diabetic chronic kidney disease         Complicating diabetes co morbidities:   History of CVA and ESRD      HPI:   Patient is a 58 y.o. male with a diagnosis of AAM with ESRD secondary to diabetic nephropathy. Other PMH includes CVA  suspect related to afib (on apixaban), back surgery , and CAD. He has been on the waitlist for a kidney transplant at [facility] since 2020. He initially started on HD then changed to PD, then switched back to HD 2023. He dialyzes on a TTS schedule, last HD 10/15. Tentative plans for kidney transplant. Endocrinology consulted for management of T2DM.          Interval HPI:   Overnight events:No acute events overnight. Patient in room 22779/39559 A. Blood glucose stable. BG at goal on current insulin regimen (SSI ). Steroid use- Methylprednisolone  500 mg . 1 Day Post-Op  Renal function- Abnormal - Creatinine 10.8.   Vasopressors-  None       Endocrine will continue to follow and manage insulin orders inpatient.         Diet Renal     Eatin%  Nausea: No  Hypoglycemia and intervention: No  Fever: No  TPN and/or TF: No  If yes, type of TF/TPN and rate: n/a    BP (!) 168/74 (BP Location: Right arm, Patient Position: Sitting)   Pulse 87   Temp 98.7 °F (37.1 °C) (Oral)   Resp (!) 21   Ht 5' 7" (1.702 m)   Wt 87.6 kg (193 lb 2 oz)   SpO2 96%   BMI 30.25 kg/m²     Labs Reviewed and Include  " "  Recent Labs   Lab 10/17/24  0455   *   CALCIUM 8.5*   ALBUMIN 3.3*      K 4.3   CO2 22*   CL 99   BUN 48*   CREATININE 10.8*     Lab Results   Component Value Date    WBC 3.22 (L) 10/17/2024    HGB 9.4 (L) 10/17/2024    HCT 29.7 (L) 10/17/2024    MCV 84 10/17/2024     (L) 10/17/2024     No results for input(s): "TSH", "FREET4" in the last 168 hours.  Lab Results   Component Value Date    HGBA1C 4.8 10/16/2024       Nutritional status:   Body mass index is 30.25 kg/m².  Lab Results   Component Value Date    ALBUMIN 3.3 (L) 10/17/2024    ALBUMIN 3.2 (L) 10/17/2024    ALBUMIN 3.8 10/16/2024     No results found for: "PREALBUMIN"    Estimated Creatinine Clearance: 7.9 mL/min (A) (based on SCr of 10.8 mg/dL (H)).    Accu-Checks  Recent Labs     10/16/24  0957 10/16/24  1432 10/16/24  1743 10/16/24  1847 10/16/24  2024 10/17/24  0359 10/17/24  0751   POCTGLUCOSE 101 95 72 90 81 94 166*       Current Medications and/or Treatments Impacting Glycemic Control  Immunotherapy:    Immunosuppressants           Stop Route Frequency     mycophenolate capsule 1,000 mg         -- Oral 2 times daily     tacrolimus capsule 3 mg         -- Oral 2 times daily          Steroids:   Hormones (From admission, onward)      Start     Stop Route Frequency Ordered    10/20/24 0900  predniSONE tablet 20 mg  (IP TXP KIDNEY POST-OP SIMULECT)         11/16/24 0859 Oral Daily 10/17/24 0358    10/19/24 0900  methylPREDNISolone sodium succinate injection 125 mg  (IP TXP KIDNEY POST-OP SIMULECT)         -- IV Once 10/17/24 0358    10/18/24 0900  methylPREDNISolone sodium succinate injection 250 mg  (IP TXP KIDNEY POST-OP SIMULECT)         -- IV Once 10/17/24 0358          Pressors:    Autonomic Drugs (From admission, onward)      None          Hyperglycemia/Diabetes Medications:   Antihyperglycemics (From admission, onward)      Start     Stop Route Frequency Ordered    10/17/24 0443  insulin aspart U-100 pen 0-5 Units  (INSULIN - " LOW CORRECTION DOSE (Recommended for BMI <25, GFR<15 or on dialysis, Age > 70, type 1 diabetes, ESLD, severe CHF or patients on <70 units of insulin daily))         -- SubQ Before meals & nightly PRN 10/17/24 0358            ASSESSMENT and PLAN    Renal/  * Status post -donor kidney transplantation  Managed per primary team  Optimize BG control        Immunology/Multi System  Prophylactic immunotherapy  May increase insulin resistance.         Endocrine  Class 1 obesity due to excess calories with serious comorbidity and body mass index (BMI) of 30.0 to 30.9 in adult  Body mass index is 30.25 kg/m².  May increase insulin resistance.         Controlled type 2 diabetes mellitus with kidney complication, without long-term current use of insulin  BG goal 140-180    Low Dose Correction scale  BG monitoring ac/hs   Will monitor for prandial excursions and consider scheduled Novolog if noted    ** Please call Endocrine for any BG related issues **    Lab Results   Component Value Date    HGBA1C 4.8 10/16/2024               Maria Del Rosario Kelsey NP  Endocrinology  [facility] - Transplant Stepdown

## 2024-10-17 NOTE — PLAN OF CARE
Patient remained free from injury this shift.  Patient up in chair for 7+ hours today.  Patient up and ambulated to restroom with one person assist.  Patient displays no skin breakdown.  Patient BG < 180.  Patient reports no appetite and staying with liquids.  Reports smell of food is making him nauseated.  CECILIA drain put out 40cc sang drainage.  U/O > 175cc every hour.  Fluids continue at 100cc/hr until 12am.  Patient with some nausea today which was relieved with Zofran and Compazine.  Reviewed blue card with wife and wife verbalizes understanding of procedure.  Patient too drowsy to pull medications.  BP elevated today.  BP meds adjusted.  Patient noted to be orthostatic prior and will obtain standing pressure next time patient stands up.  BP meds adjusted.  Patient educated on and able to perform IS this afternoon to 1500.  Robaxin added and patient achieved adequate pain management by this afternoon.  No s/s of infection noted.

## 2024-10-17 NOTE — HOSPITAL COURSE
Patient now s/p DBD Ktxp 10/17/24 (out OR 4AM) for DM. (CIT 10h 53m, CMV + +, KDPI 50%.) Per op note, bladder was small and the mucosa very thin. During the suture a 50% tear was identified and the anastomosis was repeated, this time taking mucosa and muscle in one layer. A superficial muscle layer was created using 6-0 PDS but it was not antireflux procedure. 10 day Arreola. 1 drain. Pt with excellent UOP. IVF discontinued once PO intake satisfactory.     Interval note:  - POD#1 Ktxp. Making excellent urine. Cr clearing well (10.7 --> 7.5). CECILIA drain w/ mild serosang output. H/H stable  - Pain control much improved, continue oxy/robaxin  - Hypertensive. Adding scheduled clonidine. Adjust as needed  - Passing flatus. Up in chair this morning. Encourage ambulating halls today. Prepare for D/c home tomorrow.

## 2024-10-17 NOTE — PROGRESS NOTES
EDUCATION NOTE:    Met with Chidi Lisa and his caregivers to provide teaching re: immunosuppressant medications.  Reviewed medication section of the Kidney Transplant Education book that was provided.  Emphasized the importance of compliance, role of the blue medication card, concerns for drug interactions, and process of obtaining refills.  Counseled regarding Prograf, Cellcept , prednisone, including directions for use, monitoring, how to handle missed doses, and side effects.  Patient and caregiver verbalized understanding and had the opportunity to ask questions.

## 2024-10-17 NOTE — PROGRESS NOTES
Notified Kanu Moreira NP of patient's NS near complete.  Also notified NP of elevated BP SBP > 180.  Order received to continue with fluids at I=O.  New order received for prn hydralazine.  Will continue to monitor patient.

## 2024-10-18 DIAGNOSIS — Z94.0 KIDNEY REPLACED BY TRANSPLANT: Primary | ICD-10-CM

## 2024-10-18 PROBLEM — I15.0 RENOVASCULAR HYPERTENSION: Status: ACTIVE | Noted: 2024-10-18

## 2024-10-18 LAB
ALBUMIN SERPL BCP-MCNC: 3.3 G/DL (ref 3.5–5.2)
ANION GAP SERPL CALC-SCNC: 12 MMOL/L (ref 8–16)
BASOPHILS # BLD AUTO: 0.01 K/UL (ref 0–0.2)
BASOPHILS NFR BLD: 0.1 % (ref 0–1.9)
BUN SERPL-MCNC: 49 MG/DL (ref 6–20)
CALCIUM SERPL-MCNC: 8.2 MG/DL (ref 8.7–10.5)
CHLORIDE SERPL-SCNC: 101 MMOL/L (ref 95–110)
CO2 SERPL-SCNC: 23 MMOL/L (ref 23–29)
CREAT SERPL-MCNC: 7.5 MG/DL (ref 0.5–1.4)
DIFFERENTIAL METHOD BLD: ABNORMAL
EOSINOPHIL # BLD AUTO: 0 K/UL (ref 0–0.5)
EOSINOPHIL NFR BLD: 0.5 % (ref 0–8)
ERYTHROCYTE [DISTWIDTH] IN BLOOD BY AUTOMATED COUNT: 16.8 % (ref 11.5–14.5)
EST. GFR  (NO RACE VARIABLE): 7.8 ML/MIN/1.73 M^2
GLUCOSE SERPL-MCNC: 107 MG/DL (ref 70–110)
HBV SURFACE AB SER QL IA: POSITIVE
HBV SURFACE AB SERPL IA-ACNC: 406 MIU/ML
HCT VFR BLD AUTO: 33 % (ref 40–54)
HGB BLD-MCNC: 10.1 G/DL (ref 14–18)
IMM GRANULOCYTES # BLD AUTO: 0.03 K/UL (ref 0–0.04)
IMM GRANULOCYTES NFR BLD AUTO: 0.4 % (ref 0–0.5)
LYMPHOCYTES # BLD AUTO: 0.1 K/UL (ref 1–4.8)
LYMPHOCYTES NFR BLD: 0.8 % (ref 18–48)
MAGNESIUM SERPL-MCNC: 2.2 MG/DL (ref 1.6–2.6)
MCH RBC QN AUTO: 26.6 PG (ref 27–31)
MCHC RBC AUTO-ENTMCNC: 30.6 G/DL (ref 32–36)
MCV RBC AUTO: 87 FL (ref 82–98)
MONOCYTES # BLD AUTO: 0.3 K/UL (ref 0.3–1)
MONOCYTES NFR BLD: 3.3 % (ref 4–15)
NEUTROPHILS # BLD AUTO: 7.1 K/UL (ref 1.8–7.7)
NEUTROPHILS NFR BLD: 94.9 % (ref 38–73)
NRBC BLD-RTO: 0 /100 WBC
PHOSPHATE SERPL-MCNC: 3.5 MG/DL (ref 2.7–4.5)
PLATELET # BLD AUTO: 128 K/UL (ref 150–450)
PMV BLD AUTO: 9.9 FL (ref 9.2–12.9)
POCT GLUCOSE: 117 MG/DL (ref 70–110)
POCT GLUCOSE: 119 MG/DL (ref 70–110)
POCT GLUCOSE: 152 MG/DL (ref 70–110)
POCT GLUCOSE: 186 MG/DL (ref 70–110)
POTASSIUM SERPL-SCNC: 4.5 MMOL/L (ref 3.5–5.1)
RBC # BLD AUTO: 3.79 M/UL (ref 4.6–6.2)
SODIUM SERPL-SCNC: 136 MMOL/L (ref 136–145)
TACROLIMUS BLD-MCNC: 3.9 NG/ML (ref 5–15)
WBC # BLD AUTO: 7.52 K/UL (ref 3.9–12.7)

## 2024-10-18 PROCEDURE — 83735 ASSAY OF MAGNESIUM: CPT

## 2024-10-18 PROCEDURE — 27000207 HC ISOLATION

## 2024-10-18 PROCEDURE — 63600175 PHARM REV CODE 636 W HCPCS: Performed by: CLINICAL NURSE SPECIALIST

## 2024-10-18 PROCEDURE — 85025 COMPLETE CBC W/AUTO DIFF WBC: CPT

## 2024-10-18 PROCEDURE — 80069 RENAL FUNCTION PANEL: CPT

## 2024-10-18 PROCEDURE — 36415 COLL VENOUS BLD VENIPUNCTURE: CPT

## 2024-10-18 PROCEDURE — 99232 SBSQ HOSP IP/OBS MODERATE 35: CPT | Mod: ,,, | Performed by: PHYSICIAN ASSISTANT

## 2024-10-18 PROCEDURE — 25000003 PHARM REV CODE 250: Performed by: PHYSICIAN ASSISTANT

## 2024-10-18 PROCEDURE — 25000003 PHARM REV CODE 250

## 2024-10-18 PROCEDURE — 63600175 PHARM REV CODE 636 W HCPCS

## 2024-10-18 PROCEDURE — 99233 SBSQ HOSP IP/OBS HIGH 50: CPT | Mod: 24,,, | Performed by: CLINICAL NURSE SPECIALIST

## 2024-10-18 PROCEDURE — 80197 ASSAY OF TACROLIMUS: CPT

## 2024-10-18 PROCEDURE — 20600001 HC STEP DOWN PRIVATE ROOM

## 2024-10-18 PROCEDURE — 25000003 PHARM REV CODE 250: Performed by: CLINICAL NURSE SPECIALIST

## 2024-10-18 RX ORDER — TACROLIMUS 5 MG/1
5 CAPSULE ORAL 2 TIMES DAILY
Status: DISCONTINUED | OUTPATIENT
Start: 2024-10-18 | End: 2024-10-19 | Stop reason: HOSPADM

## 2024-10-18 RX ORDER — OXYCODONE HYDROCHLORIDE 10 MG/1
10 TABLET ORAL EVERY 6 HOURS PRN
Qty: 28 TABLET | Refills: 0 | Status: SHIPPED | OUTPATIENT
Start: 2024-10-18

## 2024-10-18 RX ORDER — ACETAMINOPHEN 325 MG/1
650 TABLET ORAL DAILY
Status: COMPLETED | OUTPATIENT
Start: 2024-10-18 | End: 2024-10-19

## 2024-10-18 RX ORDER — NIFEDIPINE 60 MG/1
60 TABLET, EXTENDED RELEASE ORAL 2 TIMES DAILY
Qty: 60 TABLET | Refills: 5 | Status: SHIPPED | OUTPATIENT
Start: 2024-10-18

## 2024-10-18 RX ORDER — TACROLIMUS 1 MG/1
2 CAPSULE ORAL ONCE
Status: COMPLETED | OUTPATIENT
Start: 2024-10-18 | End: 2024-10-18

## 2024-10-18 RX ORDER — DIPHENHYDRAMINE HCL 25 MG
50 CAPSULE ORAL ONCE AS NEEDED
Status: DISCONTINUED | OUTPATIENT
Start: 2024-10-18 | End: 2024-10-19 | Stop reason: HOSPADM

## 2024-10-18 RX ORDER — EPINEPHRINE 1 MG/ML
1 INJECTION, SOLUTION, CONCENTRATE INTRAVENOUS ONCE AS NEEDED
Status: DISCONTINUED | OUTPATIENT
Start: 2024-10-18 | End: 2024-10-19 | Stop reason: HOSPADM

## 2024-10-18 RX ORDER — CARVEDILOL 6.25 MG/1
12.5 TABLET ORAL 2 TIMES DAILY WITH MEALS
Status: DISCONTINUED | OUTPATIENT
Start: 2024-10-18 | End: 2024-10-19 | Stop reason: HOSPADM

## 2024-10-18 RX ORDER — ACETAMINOPHEN 325 MG/1
650 TABLET ORAL ONCE AS NEEDED
Status: COMPLETED | OUTPATIENT
Start: 2024-10-18 | End: 2024-10-18

## 2024-10-18 RX ORDER — CLONIDINE HYDROCHLORIDE 0.1 MG/1
0.1 TABLET ORAL 3 TIMES DAILY
Status: DISCONTINUED | OUTPATIENT
Start: 2024-10-18 | End: 2024-10-19 | Stop reason: HOSPADM

## 2024-10-18 RX ORDER — DIPHENHYDRAMINE HCL 25 MG
25 CAPSULE ORAL DAILY
Status: COMPLETED | OUTPATIENT
Start: 2024-10-18 | End: 2024-10-19

## 2024-10-18 RX ORDER — OXYBUTYNIN CHLORIDE 5 MG/1
5 TABLET ORAL 3 TIMES DAILY
Qty: 30 TABLET | Refills: 0 | Status: SHIPPED | OUTPATIENT
Start: 2024-10-18 | End: 2025-10-18

## 2024-10-18 RX ADMIN — OXYBUTYNIN CHLORIDE 5 MG: 5 TABLET ORAL at 02:10

## 2024-10-18 RX ADMIN — DIPHENHYDRAMINE HYDROCHLORIDE 25 MG: 25 CAPSULE ORAL at 12:10

## 2024-10-18 RX ADMIN — METHOCARBAMOL 500 MG: 500 TABLET ORAL at 08:10

## 2024-10-18 RX ADMIN — FAMOTIDINE 20 MG: 20 TABLET ORAL at 08:10

## 2024-10-18 RX ADMIN — MUPIROCIN 1 G: 20 OINTMENT TOPICAL at 08:10

## 2024-10-18 RX ADMIN — ACETAMINOPHEN 650 MG: 325 TABLET ORAL at 07:10

## 2024-10-18 RX ADMIN — OXYCODONE HYDROCHLORIDE 10 MG: 10 TABLET ORAL at 11:10

## 2024-10-18 RX ADMIN — THERA TABS 1 TABLET: TAB at 08:10

## 2024-10-18 RX ADMIN — DOCUSATE SODIUM 100 MG: 100 CAPSULE, LIQUID FILLED ORAL at 08:10

## 2024-10-18 RX ADMIN — TACROLIMUS 5 MG: 5 CAPSULE ORAL at 05:10

## 2024-10-18 RX ADMIN — METHYLPREDNISOLONE SODIUM SUCCINATE 250 MG: 125 INJECTION, POWDER, FOR SOLUTION INTRAMUSCULAR; INTRAVENOUS at 12:10

## 2024-10-18 RX ADMIN — OXYBUTYNIN CHLORIDE 5 MG: 5 TABLET ORAL at 08:10

## 2024-10-18 RX ADMIN — ANTI-THYMOCYTE GLOBULIN (RABBIT) 100 MG: 5 INJECTION, POWDER, LYOPHILIZED, FOR SOLUTION INTRAVENOUS at 01:10

## 2024-10-18 RX ADMIN — NIFEDIPINE 60 MG: 30 TABLET, FILM COATED, EXTENDED RELEASE ORAL at 08:10

## 2024-10-18 RX ADMIN — HYDROMORPHONE HYDROCHLORIDE 0.5 MG: 1 INJECTION, SOLUTION INTRAMUSCULAR; INTRAVENOUS; SUBCUTANEOUS at 04:10

## 2024-10-18 RX ADMIN — HEPARIN SODIUM 5000 UNITS: 5000 INJECTION INTRAVENOUS; SUBCUTANEOUS at 08:10

## 2024-10-18 RX ADMIN — TACROLIMUS 3 MG: 1 CAPSULE ORAL at 08:10

## 2024-10-18 RX ADMIN — ACETAMINOPHEN 650 MG: 325 TABLET ORAL at 06:10

## 2024-10-18 RX ADMIN — CARVEDILOL 12.5 MG: 6.25 TABLET, FILM COATED ORAL at 08:10

## 2024-10-18 RX ADMIN — MYCOPHENOLATE MOFETIL 1000 MG: 250 CAPSULE ORAL at 08:10

## 2024-10-18 RX ADMIN — ACETAMINOPHEN 650 MG: 325 TABLET ORAL at 12:10

## 2024-10-18 RX ADMIN — OXYCODONE HYDROCHLORIDE 10 MG: 10 TABLET ORAL at 03:10

## 2024-10-18 RX ADMIN — HEPARIN SODIUM 5000 UNITS: 5000 INJECTION INTRAVENOUS; SUBCUTANEOUS at 06:10

## 2024-10-18 RX ADMIN — DOCUSATE SODIUM 100 MG: 100 CAPSULE, LIQUID FILLED ORAL at 02:10

## 2024-10-18 RX ADMIN — CLONIDINE HYDROCHLORIDE 0.1 MG: 0.1 TABLET ORAL at 08:10

## 2024-10-18 RX ADMIN — HEPARIN SODIUM 5000 UNITS: 5000 INJECTION INTRAVENOUS; SUBCUTANEOUS at 01:10

## 2024-10-18 RX ADMIN — CARVEDILOL 12.5 MG: 6.25 TABLET, FILM COATED ORAL at 05:10

## 2024-10-18 RX ADMIN — METHOCARBAMOL 500 MG: 500 TABLET ORAL at 02:10

## 2024-10-18 RX ADMIN — BISACODYL 10 MG: 5 TABLET, COATED ORAL at 08:10

## 2024-10-18 RX ADMIN — CLONIDINE HYDROCHLORIDE 0.1 MG: 0.1 TABLET ORAL at 02:10

## 2024-10-18 RX ADMIN — TACROLIMUS 2 MG: 1 CAPSULE ORAL at 11:10

## 2024-10-18 NOTE — PROGRESS NOTES
Transplant  (Tentative) Weekend Discharge Note:    Per KTS rounds, patient tentatively scheduled for discharge over the weekend.  Pt will discharge to patient's home under the care of Ashley iLsa, patient's wife, phone number [phone]. Pt aware of, involved in, and coping well with this discharge plan. Pt did not have any concerns with the discharge plan at this time.  No psychosocial concerns indicated for discharge at this time.  SW remains available at [phone].

## 2024-10-18 NOTE — ADDENDUM NOTE
Addendum  created 10/18/24 0601 by Jose Dangelo MD    Child order released for a procedure order, Clinical Note Signed, Intraprocedure Blocks edited, LDA created via procedure documentation, SmartForm saved

## 2024-10-18 NOTE — DISCHARGE SUMMARY
[facility] - Transplant Stepdown  Kidney Transplant  Discharge Summary    Patient Name: Chidi Lisa  MRN: [id number]  Admission Date: 10/16/2024  Hospital Length of Stay: 2 days  Discharge Date and Time:  10/19/2024 1:21 PM  Attending Physician: Eron Diane, *   Discharging Provider: Michel Moreira NP  Primary Care Provider: Johnnie Khan MD    HPI:   Chidi Lisa is a 58 yr AAM with ESRD secondary to diabetic nephropathy. Other PMH includes CVA 2016 suspect related to afib (on apixaban), back surgery 2022, and CAD. He has been on the waitlist for a kidney transplant at [facility] since 1/17/2020. He initially started on HD then changed to PD, then switched back to HD 5/2023. He dialyzes on a TTS schedule, last HD 10/15. He reports that he is tolerating HD well. He has a LUE AV fistula. DW 87.5 kg. Native uop < 1 cups per day. He is being admitted for kidney transplant surgery. He feels well in his usual state of health. He denies any recent illnesses or hospitalizations. Pre op labs and diagnostic studies pending, to be reviewed before surgery.   Procedure(s) (LRB):  TRANSPLANT, KIDNEY (N/A)     Hospital Course:    Patient now s/p DBD Ktxp 10/17/24 (out OR 4AM) for DM. (CIT 10h 53m, CMV + +, KDPI 50%.) Per op note, bladder was small and the mucosa very thin. During the suture a 50% tear was identified and the anastomosis was repeated, this time taking mucosa and muscle in one layer. A superficial muscle layer was created using 6-0 PDS but it was not antireflux procedure. 10 day Arreola. 1 drain. Pt with excellent UOP. IVF discontinued once PO intake satisfactory. Cr clearing well. Patient ambulating w/o difficulty. Pain controlled on oral regimen. Passing flatus and tolerating diet.     The patient is now stable for discharge. Discharge instructions and education have been discussed with the patient at bedside. All questions have been answered. PharmD has educated patient. Transplant  coordinator has met with patient and discussed discharge planning. He will discharge with Arreola and drain in place.       Length of Stay was longer than expected due to: Discharged as expected    Goals of Care Treatment Preferences:  Code Status: Full Code      Final Active Diagnoses:    Diagnosis Date Noted POA    PRINCIPAL PROBLEM:  Status post -donor kidney transplantation [Z94.0] 10/17/2024 Not Applicable    Renovascular hypertension [I15.0] 10/18/2024 Yes    Prophylactic immunotherapy [Z29.89] 10/17/2024 Not Applicable    Long-term use of immunosuppressant medication [Z79.60] 10/17/2024 Not Applicable    At risk for opportunistic infections [Z91.89] 10/17/2024 Yes    Anemia of chronic disease [D63.8] 10/15/2024 Yes    Class 1 obesity due to excess calories with serious comorbidity and body mass index (BMI) of 30.0 to 30.9 in adult [E66.811, E66.09, Z68.30] 2024 Not Applicable    Long term (current) use of anticoagulants--Eliquis [Z79.01] 10/27/2020 Not Applicable     Chronic    Controlled type 2 diabetes mellitus with kidney complication, without long-term current use of insulin [E11.29] 10/04/2019 Yes     Chronic      Problems Resolved During this Admission:    Diagnosis Date Noted Date Resolved POA    ESRD (end stage renal disease) [N18.6] 2023 10/17/2024 Yes       Consults (From admission, onward)          Status Ordering Provider     Inpatient consult to Registered Dietitian/Nutritionist  Once        Provider:  (Not yet assigned)    Completed NADYA SOTOMAYOR     Inpatient consult to Transplant Nephrology (KTM)  Once        Provider:  (Not yet assigned)    Acknowledged NADYA SOTOMAYOR     Inpatient consult to Endocrinology  Once        Provider:  (Not yet assigned)    Completed JOSÉ MIGUEL GRUBER            Pending Diagnostic Studies:       None          Significant Diagnostic Studies: Labs: BMP:   Recent Labs   Lab 10/18/24  0714 10/19/24  0627    117*    136   K  4.5 4.5    103   CO2 23 22*   BUN 49* 47*   CREATININE 7.5* 4.7*   CALCIUM 8.2* 8.1*   MG 2.2 2.2   , CMP   Recent Labs   Lab 10/18/24  0714 10/19/24  0627    136   K 4.5 4.5    103   CO2 23 22*    117*   BUN 49* 47*   CREATININE 7.5* 4.7*   CALCIUM 8.2* 8.1*   ALBUMIN 3.3* 3.3*   ANIONGAP 12 11   , CBC   Recent Labs   Lab 10/18/24  0714 10/19/24  0627   WBC 7.52 3.54*   HGB 10.1* 9.8*   HCT 33.0* 30.9*   * 125*   , and All labs within the past 24 hours have been reviewed    Discharged Condition: stable    Disposition:     Follow Up:    Patient Instructions:   No discharge procedures on file.  Medications:  Reconciled Home Medications:      Medication List        START taking these medications      famotidine 20 MG tablet  Commonly known as: PEPCID  Take 1 tablet (20 mg total) by mouth once daily.     JANUVIA 25 mg Tab  Generic drug: SITagliptin phosphate  Take 1 tablet (25 mg total) by mouth once daily.     multivitamin Tab  Take 1 tablet by mouth once daily.     mycophenolate 250 mg Cap  Commonly known as: CELLCEPT  Take 4 capsules (1,000 mg total) by mouth 2 (two) times daily.     oxybutynin 5 MG Tab  Commonly known as: DITROPAN  Take 1 tablet (5 mg total) by mouth 3 (three) times daily.     oxyCODONE 10 mg Tab immediate release tablet  Commonly known as: ROXICODONE  Take 1 tablet (10 mg total) by mouth every 6 (six) hours as needed for Pain.     predniSONE 5 MG tablet  Commonly known as: DELTASONE  Take by mouth daily: 10/20 to 10/26 20 mg, 10/27 to 11/2 15 mg, 11/3 to 11/9 10 mg, 11/10/2024 to forever 5 mg,DO NOT STOP     sulfamethoxazole-trimethoprim 400-80mg 400-80 mg per tablet  Commonly known as: BACTRIM,SEPTRA  Take 1 tablet by mouth every Mon, Wed, Fri. Stop 4/17/2025  Start taking on: October 21, 2024     tacrolimus 1 MG Cap  Commonly known as: PROGRAF  Take 5 capsules (5 mg total) by mouth every 12 (twelve) hours.     valGANciclovir 450 mg Tab  Commonly known as:  VALCYTE  Take 1 tablet (450 mg total) by mouth every Mon, Wed, Fri. Stop 1/17/2025  Start taking on: October 21, 2024            CHANGE how you take these medications      carvediloL 12.5 MG tablet  Commonly known as: COREG  Take 1 tablet (12.5 mg total) by mouth 2 (two) times daily with meals.  What changed:   medication strength  how much to take  when to take this     cloNIDine 0.1 MG tablet  Commonly known as: CATAPRES  Take 1 tablet (0.1 mg total) by mouth 3 (three) times daily.  What changed:   when to take this  additional instructions            CONTINUE taking these medications      apixaban 2.5 mg Tab  Commonly known as: ELIQUIS  Take 1 tablet (2.5 mg total) by mouth 2 (two) times daily.     atorvastatin 40 MG tablet  Commonly known as: LIPITOR  Take 40 mg by mouth every evening.     NIFEdipine 60 MG (OSM) 24 hr tablet  Commonly known as: PROCARDIA-XL  Take 1 tablet (60 mg total) by mouth 2 (two) times a day.            STOP taking these medications      allopurinoL 100 MG tablet  Commonly known as: ZYLOPRIM     calcium carbonate 300 mg (750 mg) Chew  Commonly known as: TUMS     co-enzyme Q-10 30 mg capsule     docusate sodium 100 MG capsule  Commonly known as: COLACE     doxazosin 4 MG tablet  Commonly known as: CARDURA     furosemide 80 MG tablet  Commonly known as: LASIX     NEPHRO-ALLY ORAL     pioglitazone 30 MG tablet  Commonly known as: ACTOS     sildenafiL 50 MG tablet  Commonly known as: VIAGRA     tadalafiL 5 MG tablet  Commonly known as: CIALIS     traMADoL 50 mg tablet  Commonly known as: ULTRAM            Time spent caring for patient (Greater than 1/2 spent in direct face-to-face contact): > 30 minutes    Michel Moreira NP  Kidney Transplant  [facility] - Transplant Stepdown

## 2024-10-18 NOTE — PROGRESS NOTES
"[facility] - Transplant Stepdown  Endocrinology  Progress Note    Admit Date: 10/16/2024     Reason for Consult: Management of T2DM, Hyperglycemia     Surgical Procedure and Date: tentative plans for kidney transplant on 10/16/24    Diabetes diagnosis year: 2006    Home Diabetes Medications:  Actos 30 mg daily     Lab Results   Component Value Date    HGBA1C 5.2 07/16/2023       How often checking glucose at home?  Daily     BG readings on regimen:   Hypoglycemia on the regimen?  No  Missed doses on regimen?  No    Diabetes Complications include:     Diabetic nephropathy   and Diabetic chronic kidney disease         Complicating diabetes co morbidities:   History of CVA and ESRD      HPI:   Patient is a 58 y.o. male with a diagnosis of AAM with ESRD secondary to diabetic nephropathy. Other PMH includes CVA 2016 suspect related to afib (on apixaban), back surgery 2022, and CAD. He has been on the waitlist for a kidney transplant at [facility] since 1/17/2020. He initially started on HD then changed to PD, then switched back to HD 5/2023. He dialyzes on a TTS schedule, last HD 10/15. Tentative plans for kidney transplant. Endocrinology consulted for management of T2DM.          Interval HPI:   No acute events overnight. Patient in room 92735/73766 A. Blood glucose stable. BG at goal on current insulin regimen (SSI ). Steroid use- Methylprednisolone  and Hydrocortisone  .   2 Days Post-Op  Renal function- Abnormal -     Vasopressors-  None     Diet Renal     Eating:   <25%  Nausea: No  Hypoglycemia and intervention: No  Fever: No  TPN and/or TF: No       BP (!) 172/81 (BP Location: Right arm, Patient Position: Standing)   Pulse 74   Temp 98.3 °F (36.8 °C) (Oral)   Resp 14   Ht 5' 7" (1.702 m)   Wt 88.2 kg (194 lb 5.4 oz)   SpO2 95%   BMI 30.44 kg/m²     Labs Reviewed and Include    Recent Labs   Lab 10/18/24  0714      CALCIUM 8.2*   ALBUMIN 3.3*      K 4.5   CO2 23      BUN 49*   CREATININE " "7.5*     Lab Results   Component Value Date    WBC 7.52 10/18/2024    HGB 10.1 (L) 10/18/2024    HCT 33.0 (L) 10/18/2024    MCV 87 10/18/2024     (L) 10/18/2024     No results for input(s): "TSH", "FREET4" in the last 168 hours.  Lab Results   Component Value Date    HGBA1C 4.8 10/16/2024       Nutritional status:   Body mass index is 30.44 kg/m².  Lab Results   Component Value Date    ALBUMIN 3.3 (L) 10/18/2024    ALBUMIN 3.2 (L) 10/17/2024    ALBUMIN 3.3 (L) 10/17/2024     No results found for: "PREALBUMIN"    Estimated Creatinine Clearance: 11.4 mL/min (A) (based on SCr of 7.5 mg/dL (H)).    Accu-Checks  Recent Labs     10/16/24  0957 10/16/24  1432 10/16/24  1743 10/16/24  1847 10/16/24  2024 10/17/24  0359 10/17/24  0751 10/17/24  1332 10/17/24  1743 10/17/24  2023   POCTGLUCOSE 101 95 72 90 81 94 166* 176* 151* 132*       Current Medications and/or Treatments Impacting Glycemic Control  Immunotherapy:    Immunosuppressants           Stop Route Frequency     antithymocyte globulin (rabbit) 100 mg, hydrocortisone sodium succinate (SOLU-CORTEF) 20 mg in 0.9% NaCl 500 mL (FOR PERIPHERAL LINE ADMINISTRATION ONLY)         10/20/24 0859 IV Daily     mycophenolate capsule 1,000 mg         -- Oral 2 times daily     tacrolimus capsule 3 mg         -- Oral 2 times daily          Steroids:   Hormones (From admission, onward)      Start     Stop Route Frequency Ordered    10/20/24 0900  predniSONE tablet 20 mg  (IP TXP KIDNEY POST-OP SIMULECT)         11/16/24 0859 Oral Daily 10/17/24 0358    10/19/24 0900  methylPREDNISolone sodium succinate injection 125 mg  (IP TXP KIDNEY POST-OP SIMULECT)         -- IV Once 10/17/24 0358    10/18/24 0900  methylPREDNISolone sodium succinate injection 250 mg  (IP TXP KIDNEY POST-OP SIMULECT)         -- IV Once 10/17/24 0358    10/18/24 0900  antithymocyte globulin (rabbit) 100 mg, hydrocortisone sodium succinate (SOLU-CORTEF) 20 mg in 0.9% NaCl 500 mL (FOR PERIPHERAL LINE " ADMINISTRATION ONLY)         10/20/24 0859 IV Daily 10/18/24 0757    10/18/24 0844  hydrocortisone sodium succinate injection 100 mg         03/15/36 2344 IV Once as needed 10/18/24 075          Pressors:    Autonomic Drugs (From admission, onward)      Start     Stop Route Frequency Ordered    10/18/24 0844  EPINEPHrine (PF) injection 1 mg         03/15/36 2344 SubQ Once as needed 10/18/24 075          Hyperglycemia/Diabetes Medications:   Antihyperglycemics (From admission, onward)      Start     Stop Route Frequency Ordered    10/17/24 0443  insulin aspart U-100 pen 0-5 Units  (INSULIN - LOW CORRECTION DOSE (Recommended for BMI <25, GFR<15 or on dialysis, Age > 70, type 1 diabetes, ESLD, severe CHF or patients on <70 units of insulin daily))         -- SubQ Before meals & nightly PRN 10/17/24 0358            ASSESSMENT and PLAN    Renal/  * Status post -donor kidney transplantation  Managed per primary team  Optimize BG control        Endocrine  Class 1 obesity due to excess calories with serious comorbidity and body mass index (BMI) of 30.0 to 30.9 in adult  Body mass index is 30.44 kg/m².  May increase insulin resistance.         Controlled type 2 diabetes mellitus with kidney complication, without long-term current use of insulin  BG goal 140-180  Type 2 diabetes. Status post kidney transplant.  BG mostly at goal    Low Dose Correction scale  BG monitoring ac/hs   Will monitor for prandial excursions and consider scheduled Novolog if noted    ** Please call Endocrine for any BG related issues **    Lab Results   Component Value Date    HGBA1C 4.8 10/16/2024               Hilton Godfrey PA-C  Endocrinology  [facility] - Transplant Stepdown

## 2024-10-18 NOTE — ASSESSMENT & PLAN NOTE
- Continue prograf, cellcept, and steroid taper  - Check prograf level daily and adjust for therapeutic dosage. Monitor for toxic side effects   - Thymo X 3 doses

## 2024-10-18 NOTE — ANESTHESIA PROCEDURE NOTES
Arterial    Diagnosis: Renal failure    Patient location during procedure: done in OR  Timeout: 10/16/2024 11:01 PM  Procedure end time: 10/16/2024 11:14 PM    Staffing  Authorizing Provider: Jose Dangelo MD  Performing Provider: Jose Dangelo MD    Staffing  Performed by: Jose Dangelo MD  Authorized by: Jose Dangelo MD    Anesthesiologist was present at the time of the procedure.    Preanesthetic Checklist  Completed: patient identified, IV checked, site marked, risks and benefits discussed, surgical consent, monitors and equipment checked, pre-op evaluation, timeout performed and anesthesia consent givenArterial  Skin Prep: chlorhexidine gluconate and isopropyl alcohol  Local Infiltration: none  Orientation: right  Location: radial    Catheter Size: 20 G  Catheter placement by Ultrasound guidance. Heme positive aspiration all ports.   Vessel Caliber: medium, patent, compressibility normal  Vascular Doppler:  not done  Needle advanced into vessel with real time Ultrasound guidance.Insertion Attempts: 1  Assessment  Dressing: secured with tape and tegaderm

## 2024-10-18 NOTE — PROGRESS NOTES
"[facility] - Transplant Stepdown  Adult Nutrition  Progress Note    SUMMARY   Recommendations    1.) Recommend continuing with renal diet as tolerated, fluid per MD.                  - may liberalize diet to Low Na/Diabetic diet as renal labs normalize.      2.) If PO intake <50%, recommend Novasource Renal BID to help meet needs.      3.) RD to monitor wt, PO intake, skin, labs.      Goals: to meet % of EEN/EPN by next RD f/u  Nutrition Goal Status: progressing towards goal  Communication of RD Recs:  (POC)    Assessment and Plan    Nutrition Problem  Limited food and nutrition related knowledge     Related to (etiology):   S/p DBD kidney tx     Signs and Symptoms (as evidenced by):   Lack of prior knowledge to Food Safety diet edu     Interventions/Recommendations (treatment strategy):  Collaboration of nutritional care with other providers  Diet edu     Nutrition Diagnosis Status:   Continues     Reason for Assessment    Reason For Assessment: RD follow-up  Diagnosis: renal disease (s/p DBD kidney tx)  General Information Comments: Rd f/u, pt at bedside with wife in room. Endorses nausea yesterday. 25% IN of breakfast today. No questions about diet education yesterday.  Nutrition Discharge Planning: Post transplant nutrition education provided on 10/17. Food safety/drug interactions emphasized. General healthy/low salt diet recommended. Education material left at bedside. No other needs identified.    Nutrition Risk Screen    Nutrition Risk Screen: no indicators present    Nutrition/Diet History    Spiritual, Cultural Beliefs, Restoration Practices, Values that Affect Care: no  Food Allergies: NKFA    Anthropometrics    Temp: 98.4 °F (36.9 °C)  Height Method: Stated  Height: 5' 7" (170.2 cm)  Height (inches): 67 in  Weight Method: Standard Scale  Weight: 88.2 kg (194 lb 5.4 oz)  Weight (lb): 194.34 lb  Ideal Body Weight (IBW), Male: 148 lb  % Ideal Body Weight, Male (lb): 130.49 %  BMI (Calculated): 30.4   "     Lab/Procedures/Meds    Pertinent Labs Reviewed: reviewed  Pertinent Labs Comments: H/H: 10.1/33, mch: 26.6, mchc: 30.6, bun: 49, creatinine: 7.5, gfr: 7.8  Pertinent Medications Reviewed: reviewed  Pertinent Medications Comments: Carvedilol, docusate sodium, heparin, famotidine, prednisone, tacrolimus    Estimated/Assessed Needs    Weight Used For Calorie Calculations: 87.6 kg (193 lb 2 oz)  Energy Calorie Requirements (kcal): 1985 kcal  Energy Need Method: Gray-St Jeor (MSJ x 1.2)  Protein Requirements: 88g (1.0g/kg)  Weight Used For Protein Calculations: 87.6 kg (193 lb 2 oz)  Fluid Requirements (mL): as per MD or RDA  Estimated Fluid Requirement Method: RDA Method  RDA Method (mL): 1985  CHO Requirement: 248 g      Nutrition Prescription Ordered    Current Diet Order: Renal    Evaluation of Received Nutrient/Fluid Intake    I/O: +1.4 L since admit  Fluid Required:  (as per MD)  Comments: LBM 10/15  Tolerance: tolerating  % Intake of Estimated Energy Needs: 25 - 50 %  % Meal Intake: 25 - 50 %    Nutrition Risk    Level of Risk/Frequency of Follow-up:  (RD to f/u x 2 prior to d/c)     Monitor and Evaluation    Food and Nutrient Intake: food and beverage intake  Food and Nutrient Adminstration: diet order  Anthropometric Measurements: weight, weight change, body mass index  Biochemical Data, Medical Tests and Procedures: electrolyte and renal panel, gastrointestinal profile, glucose/endocrine profile, inflammatory profile, lipid profile  Nutrition-Focused Physical Findings: overall appearance, skin     Nutrition Follow-Up    RD Follow-up?: Yes    Hiral Ceron RD, LDN

## 2024-10-18 NOTE — SUBJECTIVE & OBJECTIVE
"Interval HPI:   No acute events overnight. Patient in room 59814/72216 A. Blood glucose stable. BG at goal on current insulin regimen (SSI ). Steroid use- Methylprednisolone  and Hydrocortisone  .   2 Days Post-Op  Renal function- Abnormal -     Vasopressors-  None     Diet Renal     Eating:   <25%  Nausea: No  Hypoglycemia and intervention: No  Fever: No  TPN and/or TF: No       BP (!) 172/81 (BP Location: Right arm, Patient Position: Standing)   Pulse 74   Temp 98.3 °F (36.8 °C) (Oral)   Resp 14   Ht 5' 7" (1.702 m)   Wt 88.2 kg (194 lb 5.4 oz)   SpO2 95%   BMI 30.44 kg/m²     Labs Reviewed and Include    Recent Labs   Lab 10/18/24  0714      CALCIUM 8.2*   ALBUMIN 3.3*      K 4.5   CO2 23      BUN 49*   CREATININE 7.5*     Lab Results   Component Value Date    WBC 7.52 10/18/2024    HGB 10.1 (L) 10/18/2024    HCT 33.0 (L) 10/18/2024    MCV 87 10/18/2024     (L) 10/18/2024     No results for input(s): "TSH", "FREET4" in the last 168 hours.  Lab Results   Component Value Date    HGBA1C 4.8 10/16/2024       Nutritional status:   Body mass index is 30.44 kg/m².  Lab Results   Component Value Date    ALBUMIN 3.3 (L) 10/18/2024    ALBUMIN 3.2 (L) 10/17/2024    ALBUMIN 3.3 (L) 10/17/2024     No results found for: "PREALBUMIN"    Estimated Creatinine Clearance: 11.4 mL/min (A) (based on SCr of 7.5 mg/dL (H)).    Accu-Checks  Recent Labs     10/16/24  0957 10/16/24  1432 10/16/24  1743 10/16/24  1847 10/16/24  2024 10/17/24  0359 10/17/24  0751 10/17/24  1332 10/17/24  1743 10/17/24  2023   POCTGLUCOSE 101 95 72 90 81 94 166* 176* 151* 132*       Current Medications and/or Treatments Impacting Glycemic Control  Immunotherapy:    Immunosuppressants           Stop Route Frequency     antithymocyte globulin (rabbit) 100 mg, hydrocortisone sodium succinate (SOLU-CORTEF) 20 mg in 0.9% NaCl 500 mL (FOR PERIPHERAL LINE ADMINISTRATION ONLY)         10/20/24 0859 IV Daily     mycophenolate capsule " 1,000 mg         -- Oral 2 times daily     tacrolimus capsule 3 mg         -- Oral 2 times daily          Steroids:   Hormones (From admission, onward)      Start     Stop Route Frequency Ordered    10/20/24 0900  predniSONE tablet 20 mg  (IP TXP KIDNEY POST-OP SIMULECT)         11/16/24 0859 Oral Daily 10/17/24 0358    10/19/24 0900  methylPREDNISolone sodium succinate injection 125 mg  (IP TXP KIDNEY POST-OP SIMULECT)         -- IV Once 10/17/24 0358    10/18/24 0900  methylPREDNISolone sodium succinate injection 250 mg  (IP TXP KIDNEY POST-OP SIMULECT)         -- IV Once 10/17/24 0358    10/18/24 0900  antithymocyte globulin (rabbit) 100 mg, hydrocortisone sodium succinate (SOLU-CORTEF) 20 mg in 0.9% NaCl 500 mL (FOR PERIPHERAL LINE ADMINISTRATION ONLY)         10/20/24 0859 IV Daily 10/18/24 0757    10/18/24 0844  hydrocortisone sodium succinate injection 100 mg         03/15/36 2344 IV Once as needed 10/18/24 0757          Pressors:    Autonomic Drugs (From admission, onward)      Start     Stop Route Frequency Ordered    10/18/24 0844  EPINEPHrine (PF) injection 1 mg         03/15/36 2344 SubQ Once as needed 10/18/24 0757          Hyperglycemia/Diabetes Medications:   Antihyperglycemics (From admission, onward)      Start     Stop Route Frequency Ordered    10/17/24 0443  insulin aspart U-100 pen 0-5 Units  (INSULIN - LOW CORRECTION DOSE (Recommended for BMI <25, GFR<15 or on dialysis, Age > 70, type 1 diabetes, ESLD, severe CHF or patients on <70 units of insulin daily))         -- SubQ Before meals & nightly PRN 10/17/24 0358

## 2024-10-18 NOTE — ASSESSMENT & PLAN NOTE
- continue nifedipine and carvedilol  - scheduled clonidine added 10/18  - treat standing BP's only

## 2024-10-18 NOTE — PROGRESS NOTES
[facility] - Transplant Stepdown  Kidney Transplant  Progress Note      Reason for Follow-up: Reassessment of Kidney Transplant - 10/17/2024  (#1) recipient and management of immunosuppression.    ORGAN:   RIGHT KIDNEY    Donor Type:   Donation after Brain Death    Donor CMV Status: Positive  Donor HBcAB:Negative  Donor HCV Status:Negative  Donor HBV PRISCILLA:   Donor HCV PRISCILLA: Negative, Negative      Subjective:   History of Present Illness:  Chidi Lisa is a 58 yr AAM with ESRD secondary to diabetic nephropathy. Other PMH includes CVA 2016 suspect related to afib (on apixaban), back surgery 2022, and CAD. He has been on the waitlist for a kidney transplant at [facility] since 1/17/2020. He initially started on HD then changed to PD, then switched back to HD 5/2023. He dialyzes on a TTS schedule, last HD 10/15. He reports that he is tolerating HD well. He has a LUE AV fistula. DW 87.5 kg. Native uop < 1 cups per day. He is being admitted for kidney transplant surgery. He feels well in his usual state of health. He denies any recent illnesses or hospitalizations. Pre op labs and diagnostic studies pending, to be reviewed before surgery.       Hospital Course:  Patient now s/p DBD Ktxp 10/17/24 (out OR 4AM) for DM. (CIT 10h 53m, CMV + +, KDPI 50%.) Per op note, bladder was small and the mucosa very thin. During the suture a 50% tear was identified and the anastomosis was repeated, this time taking mucosa and muscle in one layer. A superficial muscle layer was created using 6-0 PDS but it was not antireflux procedure. 10 day Arreola. 1 drain. Pt with excellent UOP. IVF discontinued once PO intake satisfactory.     Interval note:  - POD#1 Ktxp. Making excellent urine. Cr clearing well (10.7 --> 7.5). CECILIA drain w/ mild serosang output. H/H stable  - Pain control much improved, continue oxy/robaxin  - Hypertensive. Adding scheduled clonidine. Adjust as needed  - Passing flatus. Up in chair this morning. Encourage ambulating halls  today. Prepare for D/c home tomorrow.       Past Medical, Surgical, Family, and Social History:   Unchanged from H&P.    Scheduled Meds:   acetaminophen  650 mg Oral Q8H    acetaminophen  650 mg Oral Daily    antithymocyte globulin (rabbit) 100 mg, hydrocortisone sodium succinate (SOLU-CORTEF) 20 mg in 0.9% NaCl 500 mL (FOR PERIPHERAL LINE ADMINISTRATION ONLY)  1.5 mg/kg (Adjusted) Intravenous Daily    bisacodyL  10 mg Oral QHS    carvediloL  12.5 mg Oral BID WM    cloNIDine  0.1 mg Oral TID    diphenhydrAMINE  25 mg Oral Daily    docusate sodium  100 mg Oral TID    famotidine  20 mg Oral QHS    heparin (porcine)  5,000 Units Subcutaneous Q8H    methocarbamoL  500 mg Oral TID    [START ON 10/19/2024] methylPREDNISolone injection (PEDS and ADULTS)  125 mg Intravenous Once    methylPREDNISolone injection (PEDS and ADULTS)  250 mg Intravenous Once    multivitamin  1 tablet Oral Daily    mupirocin  1 g Nasal BID    mycophenolate  1,000 mg Oral BID    NIFEdipine  60 mg Oral BID    oxybutynin  5 mg Oral TID    [START ON 10/20/2024] predniSONE  20 mg Oral Daily    [START ON 10/27/2024] sulfamethoxazole-trimethoprim 400-80mg  1 tablet Oral Daily AM    tacrolimus  3 mg Oral BID    [START ON 10/27/2024] valGANciclovir  450 mg Oral Daily AM     Continuous Infusions:  PRN Meds:  Current Facility-Administered Medications:     acetaminophen, 650 mg, Oral, Once PRN    dextrose 10%, 12.5 g, Intravenous, PRN    dextrose 10%, 25 g, Intravenous, PRN    diphenhydrAMINE, 50 mg, Oral, Once PRN    EPINEPHrine (PF), 1 mg, Subcutaneous, Once PRN    glucose, 16 g, Oral, PRN    glucose, 16 g, Oral, PRN    glucose, 24 g, Oral, PRN    hydrALAZINE, 10 mg, Intravenous, Q8H PRN    hydrocortisone sodium succinate, 100 mg, Intravenous, Once PRN    insulin aspart U-100, 0-5 Units, Subcutaneous, QID (AC + HS) PRN    ondansetron, 4 mg, Intravenous, Q6H PRN    oxyCODONE, 5 mg, Oral, Q4H PRN    oxyCODONE, 10 mg, Oral, Q4H PRN    prochlorperazine, 2.5 mg,  "Intravenous, Q6H PRN    sodium chloride 0.9%, 10 mL, Intravenous, PRN    Intake/Output - Last 3 Shifts         10/16 0700  10/17 0659 10/17 0700  10/18 0659 10/18 0700  10/19 0659    P.O. 320 860 240    I.V. (mL/kg) 54.8 (0.6) 2999 (34)     IV Piggyback 2500      Total Intake(mL/kg) 2874.8 (32.8) 3859 (43.8) 240 (2.7)    Urine (mL/kg/hr) 470 4675 (2.2) 1400 (4.7)    Drains 38 140 15    Total Output 508 4815 1415    Net +2366.8 -956 -1175                    Review of Systems   Constitutional:  Negative for activity change, chills and fever.   HENT: Negative.     Eyes: Negative.    Respiratory:  Negative for shortness of breath.    Cardiovascular:  Negative for chest pain and leg swelling.   Gastrointestinal:  Positive for abdominal pain (incisional). Negative for constipation, diarrhea, nausea and vomiting.   Endocrine: Negative.    Genitourinary:  Negative for difficulty urinating, dysuria and hematuria.   Musculoskeletal:  Negative for arthralgias and myalgias.   Skin:  Negative for wound.   Allergic/Immunologic: Positive for immunocompromised state.   Neurological:  Negative for dizziness, weakness and light-headedness.   Hematological: Negative.    Psychiatric/Behavioral:  Negative for behavioral problems and confusion. The patient is not nervous/anxious.       Objective:     Vital Signs (Most Recent):  Temp: 98.3 °F (36.8 °C) (10/18/24 0731)  Pulse: 74 (10/18/24 0731)  Resp: 14 (10/18/24 0731)  BP: (!) 172/81 (10/18/24 0731)  SpO2: 95 % (10/18/24 0731) Vital Signs (24h Range):  Temp:  [98.1 °F (36.7 °C)-99 °F (37.2 °C)] 98.3 °F (36.8 °C)  Pulse:  [57-80] 74  Resp:  [13-19] 14  SpO2:  [95 %-100 %] 95 %  BP: (162-199)/() 172/81     Weight: 88.2 kg (194 lb 5.4 oz)  Height: 5' 7" (170.2 cm)  Body mass index is 30.44 kg/m².     Physical Exam  Vitals and nursing note reviewed.   Constitutional:       General: He is not in acute distress.     Appearance: Normal appearance. He is normal weight. He is not " ill-appearing.   Cardiovascular:      Rate and Rhythm: Normal rate.      Pulses: Normal pulses.   Pulmonary:      Effort: Pulmonary effort is normal.   Abdominal:      General: Bowel sounds are normal.      Palpations: Abdomen is soft.      Tenderness: There is abdominal tenderness (appropriate, incisional).      Comments: RLQ kidney txp incision. Covered w/ gauze dressing, C/D/I    RLQ CECILIA drain with mild serosang output   Genitourinary:     Comments: Arreola   Musculoskeletal:         General: Normal range of motion.      Cervical back: Normal range of motion.      Right lower leg: No edema.      Left lower leg: No edema.      Comments: +/+ LUE AV fistula   Skin:     General: Skin is warm and dry.   Neurological:      General: No focal deficit present.      Mental Status: He is alert and oriented to person, place, and time. Mental status is at baseline.      Motor: No weakness.   Psychiatric:         Mood and Affect: Mood normal.         Behavior: Behavior normal.         Thought Content: Thought content normal.         Judgment: Judgment normal.          Laboratory:  CBC:   Recent Labs   Lab 10/17/24  0455 10/17/24  1036 10/18/24  0714   WBC 3.22* 8.29 7.52   RBC 3.53* 3.48* 3.79*   HGB 9.4* 9.4* 10.1*   HCT 29.7* 29.7* 33.0*   * 127* 128*   MCV 84 85 87   MCH 26.6* 27.0 26.6*   MCHC 31.6* 31.6* 30.6*     CMP:   Recent Labs   Lab 10/16/24  0816 10/17/24  0401 10/17/24  0455 10/17/24  1036 10/18/24  0714   GLU 88   < > 126* 163* 107   CALCIUM 9.5   < > 8.5* 8.0* 8.2*   ALBUMIN 3.8   < > 3.3* 3.2* 3.3*   PROT 7.2  --   --   --   --       < > 138 138 136   K 4.4   < > 4.3 4.4 4.5   CO2 28   < > 22* 23 23   CL 98   < > 99 100 101   BUN 40*   < > 48* 48* 49*   CREATININE 9.6*   < > 10.8* 10.7* 7.5*   ALKPHOS 80  --   --   --   --    ALT 9*  --   --   --   --    AST 18  --   --   --   --     < > = values in this interval not displayed.       Diagnostic Results:  None  Assessment/Plan:     * Status post  "-donor kidney transplantation  S/p DBD Ktxp 10/17/24 (out OR 4AM) for DM. (CIT 10h 53m, CMV + +, KDPI 50%.) bladder was small and the mucosa very thin. During the suture a 50% tear was identified and the anastomosis was repeated, this time taking mucosa and muscle in one layer. A superficial muscle layer was created using 6-0 PDS but it was not antireflux procedure. 10 day Arreola. 1 drain.     - Excellent urine output post-op. Cr clearing well  - IVF stopped midnight 10/18, patient with adequate PO hydration  - Ambulate halls TID  - Pain and bowel regimen ordered  - Avoid nephrotoxic meds  - Daily RFP    Renovascular hypertension  - continue nifedipine and carvedilol  - scheduled clonidine added 10/18  - treat standing BP's only      At risk for opportunistic infections  - Opportunistic infection prophylaxis will include Valcyte for 3 months (CMV D + , R + ) and Bactrim for 6 months       Long-term use of immunosuppressant medication  - see "prophylactic immunotherapy"      Prophylactic immunotherapy  - Continue prograf, cellcept, and steroid taper  - Check prograf level daily and adjust for therapeutic dosage. Monitor for toxic side effects   - Thymo X 3 doses      Anemia of chronic disease  - chronic related to kidney disease  - Monitor H/H daily post-op    Long term (current) use of anticoagulants--Eliquis  - h/o CVA in 2016 suspect related to afib, on apixaban  - Last dose apixaban 10/15 in AM  - Eliquis on hold for now d/t recent surgery. Likely restart Monday 10/21    Benign hypertension with ESRD (end-stage renal disease)  - home carvedilol, nifedipine, and prn clonidine restarted with hold parameters. Adjust regimen as needed    Controlled type 2 diabetes mellitus with kidney complication, without long-term current use of insulin  - consult endocrine, appreciate assistance        Discharge Planning:  Prepare for discharge tomorrow 10/19    Medical decision making for this encounter includes review of " pertinent labs and diagnostic studies, assessment and planning, discussions with consulting providers, discussion with patient/family, and participation in multidisciplinary rounds. Time spent caring for patient: 60 minutes    Michel Moreira, FRANCESCA  Kidney Transplant  [facility] - Transplant Stepdown

## 2024-10-18 NOTE — PLAN OF CARE
Pt has wife at bedside attentive to his needs. Pt has call bell in reach, non slip socks on, and bedrails up x2. He is encouraged to wash his hands. Today is thymo dose #2. He is on po oxycodone for pain. Pt bp is high sitting, but he is orthostatic. I have encouraged him to use the incentive spirometer and taught him self meds that he will start tonight. Pt has tele monitoring on in place and is ac/hs. He has a mcnamara catheter draining well. He is passing gas, but no bm yet. I have walked with him in the hallway today and will again this afternoon.

## 2024-10-18 NOTE — SUBJECTIVE & OBJECTIVE
Subjective:   History of Present Illness:  Chidi Lisa is a 58 yr AAM with ESRD secondary to diabetic nephropathy. Other PMH includes CVA 2016 suspect related to afib (on apixaban), back surgery 2022, and CAD. He has been on the waitlist for a kidney transplant at [facility] since 1/17/2020. He initially started on HD then changed to PD, then switched back to HD 5/2023. He dialyzes on a TTS schedule, last HD 10/15. He reports that he is tolerating HD well. He has a LUE AV fistula. DW 87.5 kg. Native uop < 1 cups per day. He is being admitted for kidney transplant surgery. He feels well in his usual state of health. He denies any recent illnesses or hospitalizations. Pre op labs and diagnostic studies pending, to be reviewed before surgery.       Hospital Course:  Patient now s/p DBD Ktxp 10/17/24 (out OR 4AM) for DM. (CIT 10h 53m, CMV + +, KDPI 50%.) Per op note, bladder was small and the mucosa very thin. During the suture a 50% tear was identified and the anastomosis was repeated, this time taking mucosa and muscle in one layer. A superficial muscle layer was created using 6-0 PDS but it was not antireflux procedure. 10 day Arreola. 1 drain. Pt with excellent UOP. IVF discontinued once PO intake satisfactory.     Interval note:  - POD#1 Ktxp. Making excellent urine. Cr clearing well (10.7 --> 7.5). CECILIA drain w/ mild serosang output. H/H stable  - Pain control much improved, continue oxy/robaxin  - Hypertensive. Adding scheduled clonidine. Adjust as needed  - Passing flatus. Up in chair this morning. Encourage ambulating halls today. Prepare for D/c home tomorrow.       Past Medical, Surgical, Family, and Social History:   Unchanged from H&P.    Scheduled Meds:   acetaminophen  650 mg Oral Q8H    acetaminophen  650 mg Oral Daily    antithymocyte globulin (rabbit) 100 mg, hydrocortisone sodium succinate (SOLU-CORTEF) 20 mg in 0.9% NaCl 500 mL (FOR PERIPHERAL LINE ADMINISTRATION ONLY)  1.5 mg/kg (Adjusted) Intravenous  Daily    bisacodyL  10 mg Oral QHS    carvediloL  12.5 mg Oral BID WM    cloNIDine  0.1 mg Oral TID    diphenhydrAMINE  25 mg Oral Daily    docusate sodium  100 mg Oral TID    famotidine  20 mg Oral QHS    heparin (porcine)  5,000 Units Subcutaneous Q8H    methocarbamoL  500 mg Oral TID    [START ON 10/19/2024] methylPREDNISolone injection (PEDS and ADULTS)  125 mg Intravenous Once    methylPREDNISolone injection (PEDS and ADULTS)  250 mg Intravenous Once    multivitamin  1 tablet Oral Daily    mupirocin  1 g Nasal BID    mycophenolate  1,000 mg Oral BID    NIFEdipine  60 mg Oral BID    oxybutynin  5 mg Oral TID    [START ON 10/20/2024] predniSONE  20 mg Oral Daily    [START ON 10/27/2024] sulfamethoxazole-trimethoprim 400-80mg  1 tablet Oral Daily AM    tacrolimus  3 mg Oral BID    [START ON 10/27/2024] valGANciclovir  450 mg Oral Daily AM     Continuous Infusions:  PRN Meds:  Current Facility-Administered Medications:     acetaminophen, 650 mg, Oral, Once PRN    dextrose 10%, 12.5 g, Intravenous, PRN    dextrose 10%, 25 g, Intravenous, PRN    diphenhydrAMINE, 50 mg, Oral, Once PRN    EPINEPHrine (PF), 1 mg, Subcutaneous, Once PRN    glucose, 16 g, Oral, PRN    glucose, 16 g, Oral, PRN    glucose, 24 g, Oral, PRN    hydrALAZINE, 10 mg, Intravenous, Q8H PRN    hydrocortisone sodium succinate, 100 mg, Intravenous, Once PRN    insulin aspart U-100, 0-5 Units, Subcutaneous, QID (AC + HS) PRN    ondansetron, 4 mg, Intravenous, Q6H PRN    oxyCODONE, 5 mg, Oral, Q4H PRN    oxyCODONE, 10 mg, Oral, Q4H PRN    prochlorperazine, 2.5 mg, Intravenous, Q6H PRN    sodium chloride 0.9%, 10 mL, Intravenous, PRN    Intake/Output - Last 3 Shifts         10/16 0700  10/17 0659 10/17 0700  10/18 0659 10/18 0700  10/19 0659    P.O. 320 860 240    I.V. (mL/kg) 54.8 (0.6) 2999 (34)     IV Piggyback 2500      Total Intake(mL/kg) 2874.8 (32.8) 3859 (43.8) 240 (2.7)    Urine (mL/kg/hr) 470 4675 (2.2) 1400 (4.7)    Drains 38 140 15    Total  "Output 508 4815 1415    Net +2366.8 -399 -4745                    Review of Systems   Constitutional:  Negative for activity change, chills and fever.   HENT: Negative.     Eyes: Negative.    Respiratory:  Negative for shortness of breath.    Cardiovascular:  Negative for chest pain and leg swelling.   Gastrointestinal:  Positive for abdominal pain (incisional). Negative for constipation, diarrhea, nausea and vomiting.   Endocrine: Negative.    Genitourinary:  Negative for difficulty urinating, dysuria and hematuria.   Musculoskeletal:  Negative for arthralgias and myalgias.   Skin:  Negative for wound.   Allergic/Immunologic: Positive for immunocompromised state.   Neurological:  Negative for dizziness, weakness and light-headedness.   Hematological: Negative.    Psychiatric/Behavioral:  Negative for behavioral problems and confusion. The patient is not nervous/anxious.       Objective:     Vital Signs (Most Recent):  Temp: 98.3 °F (36.8 °C) (10/18/24 0731)  Pulse: 74 (10/18/24 0731)  Resp: 14 (10/18/24 0731)  BP: (!) 172/81 (10/18/24 0731)  SpO2: 95 % (10/18/24 0731) Vital Signs (24h Range):  Temp:  [98.1 °F (36.7 °C)-99 °F (37.2 °C)] 98.3 °F (36.8 °C)  Pulse:  [57-80] 74  Resp:  [13-19] 14  SpO2:  [95 %-100 %] 95 %  BP: (162-199)/() 172/81     Weight: 88.2 kg (194 lb 5.4 oz)  Height: 5' 7" (170.2 cm)  Body mass index is 30.44 kg/m².     Physical Exam  Vitals and nursing note reviewed.   Constitutional:       General: He is not in acute distress.     Appearance: Normal appearance. He is normal weight. He is not ill-appearing.   Cardiovascular:      Rate and Rhythm: Normal rate.      Pulses: Normal pulses.   Pulmonary:      Effort: Pulmonary effort is normal.   Abdominal:      General: Bowel sounds are normal.      Palpations: Abdomen is soft.      Tenderness: There is abdominal tenderness (appropriate, incisional).      Comments: RLQ kidney txp incision. Covered w/ gauze dressing, C/D/I    RLQ CECILIA drain with " mild serosang output   Genitourinary:     Comments: Elba   Musculoskeletal:         General: Normal range of motion.      Cervical back: Normal range of motion.      Right lower leg: No edema.      Left lower leg: No edema.      Comments: +/+ LUE AV fistula   Skin:     General: Skin is warm and dry.   Neurological:      General: No focal deficit present.      Mental Status: He is alert and oriented to person, place, and time. Mental status is at baseline.      Motor: No weakness.   Psychiatric:         Mood and Affect: Mood normal.         Behavior: Behavior normal.         Thought Content: Thought content normal.         Judgment: Judgment normal.          Laboratory:  CBC:   Recent Labs   Lab 10/17/24  0455 10/17/24  1036 10/18/24  0714   WBC 3.22* 8.29 7.52   RBC 3.53* 3.48* 3.79*   HGB 9.4* 9.4* 10.1*   HCT 29.7* 29.7* 33.0*   * 127* 128*   MCV 84 85 87   MCH 26.6* 27.0 26.6*   MCHC 31.6* 31.6* 30.6*     CMP:   Recent Labs   Lab 10/16/24  0816 10/17/24  0401 10/17/24  0455 10/17/24  1036 10/18/24  0714   GLU 88   < > 126* 163* 107   CALCIUM 9.5   < > 8.5* 8.0* 8.2*   ALBUMIN 3.8   < > 3.3* 3.2* 3.3*   PROT 7.2  --   --   --   --       < > 138 138 136   K 4.4   < > 4.3 4.4 4.5   CO2 28   < > 22* 23 23   CL 98   < > 99 100 101   BUN 40*   < > 48* 48* 49*   CREATININE 9.6*   < > 10.8* 10.7* 7.5*   ALKPHOS 80  --   --   --   --    ALT 9*  --   --   --   --    AST 18  --   --   --   --     < > = values in this interval not displayed.       Diagnostic Results:  None

## 2024-10-18 NOTE — ASSESSMENT & PLAN NOTE
- h/o CVA in 2016 suspect related to afib, on apixaban  - Last dose apixaban 10/15 in AM  - Eliquis on hold for now d/t recent surgery. Likely restart Monday 10/21

## 2024-10-18 NOTE — ANESTHESIA POSTPROCEDURE EVALUATION
Anesthesia Post Evaluation    Patient: Chidi Lisa    Procedure(s) Performed: Procedure(s) (LRB):  TRANSPLANT, KIDNEY (N/A)    Final Anesthesia Type: general      Patient location during evaluation: PACU  Patient participation: Yes- Able to Participate  Level of consciousness: awake and alert  Post-procedure vital signs: reviewed and stable  Pain management: adequate  Airway patency: patent    PONV status at discharge: No PONV  Anesthetic complications: no      Cardiovascular status: blood pressure returned to baseline  Respiratory status: unassisted  Hydration status: euvolemic  Follow-up not needed.              Vitals Value Taken Time   /76 10/18/24 0419   Temp 36.7 °C (98.1 °F) 10/18/24 0419   Pulse 75 10/18/24 0419   Resp 19 10/18/24 0419   SpO2 96 % 10/18/24 0419         Event Time   Out of Recovery 10/17/2024 04:30:00         Pain/Uzma Score: Pain Rating Prior to Med Admin: 8 (10/18/2024  4:23 AM)  Pain Rating Post Med Admin: 5 (10/18/2024  4:26 AM)  Uzma Score: 9 (10/17/2024  4:30 AM)

## 2024-10-18 NOTE — ASSESSMENT & PLAN NOTE
S/p DBD Ktxp 10/17/24 (out OR 4AM) for DM. (CIT 10h 53m, CMV + +, KDPI 50%.) bladder was small and the mucosa very thin. During the suture a 50% tear was identified and the anastomosis was repeated, this time taking mucosa and muscle in one layer. A superficial muscle layer was created using 6-0 PDS but it was not antireflux procedure. 10 day Arreola. 1 drain.     - Excellent urine output post-op. Cr clearing well  - IVF stopped midnight 10/18, patient with adequate PO hydration  - Ambulate halls TID  - Pain and bowel regimen ordered  - Avoid nephrotoxic meds  - Daily RFP

## 2024-10-18 NOTE — PLAN OF CARE
-AAOx4, pleasant and cooperative  -s/p kidney transplant 10/17/24  -VSS.  Afebrile.  SB on tele.  On RA - IS encouraged and done independently  -RLQ incision w surgical island dressing still in place, CDI  -R CECILIA w/ thin sanguinous drainage  -NS @ 100 cc/hr infusing x10 hrs  -mcnamara in place w/ clear pink UOP  -standing BP - Coreg increased to 25 mg starting 10/18, PRN clonidine given per orders  -pain well-controlled w/ scheduled tylenol and robaxin + oxycodone PRN  -Cr 10.7, K 4.4  -renal diet, accuchecks achs  -self meds have been reviewed with wife, blue card and box updated  -(+) flatus, (-) BM  -pt sleeping ion chair o/n, wheels locked  -call bell and personal items in reach

## 2024-10-18 NOTE — ASSESSMENT & PLAN NOTE
BG goal 140-180  Type 2 diabetes. Status post kidney transplant.  BG mostly at goal    Low Dose Correction scale  BG monitoring ac/hs   Will monitor for prandial excursions and consider scheduled Novolog if noted    ** Please call Endocrine for any BG related issues **    Lab Results   Component Value Date    HGBA1C 4.8 10/16/2024

## 2024-10-19 ENCOUNTER — NURSE TRIAGE (OUTPATIENT)
Dept: ADMINISTRATIVE | Facility: CLINIC | Age: 59
End: 2024-10-19
Payer: COMMERCIAL

## 2024-10-19 VITALS
HEIGHT: 67 IN | SYSTOLIC BLOOD PRESSURE: 187 MMHG | RESPIRATION RATE: 14 BRPM | DIASTOLIC BLOOD PRESSURE: 79 MMHG | BODY MASS INDEX: 29.41 KG/M2 | OXYGEN SATURATION: 100 % | TEMPERATURE: 98 F | HEART RATE: 62 BPM | WEIGHT: 187.38 LBS

## 2024-10-19 LAB
ALBUMIN SERPL BCP-MCNC: 3.3 G/DL (ref 3.5–5.2)
ANION GAP SERPL CALC-SCNC: 11 MMOL/L (ref 8–16)
BASOPHILS # BLD AUTO: 0 K/UL (ref 0–0.2)
BASOPHILS NFR BLD: 0 % (ref 0–1.9)
BUN SERPL-MCNC: 47 MG/DL (ref 6–20)
CALCIUM SERPL-MCNC: 8.1 MG/DL (ref 8.7–10.5)
CHLORIDE SERPL-SCNC: 103 MMOL/L (ref 95–110)
CO2 SERPL-SCNC: 22 MMOL/L (ref 23–29)
CREAT SERPL-MCNC: 4.7 MG/DL (ref 0.5–1.4)
DIFFERENTIAL METHOD BLD: ABNORMAL
EOSINOPHIL # BLD AUTO: 0 K/UL (ref 0–0.5)
EOSINOPHIL NFR BLD: 0 % (ref 0–8)
ERYTHROCYTE [DISTWIDTH] IN BLOOD BY AUTOMATED COUNT: 16.1 % (ref 11.5–14.5)
EST. GFR  (NO RACE VARIABLE): 13.6 ML/MIN/1.73 M^2
GLUCOSE SERPL-MCNC: 117 MG/DL (ref 70–110)
HCT VFR BLD AUTO: 30.9 % (ref 40–54)
HGB BLD-MCNC: 9.8 G/DL (ref 14–18)
IMM GRANULOCYTES # BLD AUTO: 0.01 K/UL (ref 0–0.04)
IMM GRANULOCYTES NFR BLD AUTO: 0.3 % (ref 0–0.5)
LYMPHOCYTES # BLD AUTO: 0.1 K/UL (ref 1–4.8)
LYMPHOCYTES NFR BLD: 2.5 % (ref 18–48)
MAGNESIUM SERPL-MCNC: 2.2 MG/DL (ref 1.6–2.6)
MCH RBC QN AUTO: 26.7 PG (ref 27–31)
MCHC RBC AUTO-ENTMCNC: 31.7 G/DL (ref 32–36)
MCV RBC AUTO: 84 FL (ref 82–98)
MONOCYTES # BLD AUTO: 0.2 K/UL (ref 0.3–1)
MONOCYTES NFR BLD: 5.6 % (ref 4–15)
NEUTROPHILS # BLD AUTO: 3.2 K/UL (ref 1.8–7.7)
NEUTROPHILS NFR BLD: 91.6 % (ref 38–73)
NRBC BLD-RTO: 0 /100 WBC
PHOSPHATE SERPL-MCNC: 3.2 MG/DL (ref 2.7–4.5)
PLATELET # BLD AUTO: 125 K/UL (ref 150–450)
PMV BLD AUTO: 9.7 FL (ref 9.2–12.9)
POCT GLUCOSE: 113 MG/DL (ref 70–110)
POTASSIUM SERPL-SCNC: 4.5 MMOL/L (ref 3.5–5.1)
RBC # BLD AUTO: 3.67 M/UL (ref 4.6–6.2)
SODIUM SERPL-SCNC: 136 MMOL/L (ref 136–145)
TACROLIMUS BLD-MCNC: 9.8 NG/ML (ref 5–15)
WBC # BLD AUTO: 3.54 K/UL (ref 3.9–12.7)

## 2024-10-19 PROCEDURE — 83735 ASSAY OF MAGNESIUM: CPT

## 2024-10-19 PROCEDURE — 99232 SBSQ HOSP IP/OBS MODERATE 35: CPT | Mod: ,,, | Performed by: PHYSICIAN ASSISTANT

## 2024-10-19 PROCEDURE — 25000003 PHARM REV CODE 250: Performed by: CLINICAL NURSE SPECIALIST

## 2024-10-19 PROCEDURE — 80197 ASSAY OF TACROLIMUS: CPT

## 2024-10-19 PROCEDURE — 80069 RENAL FUNCTION PANEL: CPT

## 2024-10-19 PROCEDURE — 25000003 PHARM REV CODE 250: Performed by: PHYSICIAN ASSISTANT

## 2024-10-19 PROCEDURE — 85025 COMPLETE CBC W/AUTO DIFF WBC: CPT

## 2024-10-19 PROCEDURE — 63600175 PHARM REV CODE 636 W HCPCS

## 2024-10-19 PROCEDURE — 36415 COLL VENOUS BLD VENIPUNCTURE: CPT

## 2024-10-19 PROCEDURE — 25000003 PHARM REV CODE 250

## 2024-10-19 PROCEDURE — 99239 HOSP IP/OBS DSCHRG MGMT >30: CPT | Mod: ,,, | Performed by: NURSE PRACTITIONER

## 2024-10-19 PROCEDURE — 63600175 PHARM REV CODE 636 W HCPCS: Performed by: CLINICAL NURSE SPECIALIST

## 2024-10-19 RX ORDER — CARVEDILOL 12.5 MG/1
12.5 TABLET ORAL 2 TIMES DAILY WITH MEALS
Qty: 180 TABLET | Refills: 3 | Status: SHIPPED | OUTPATIENT
Start: 2024-10-19 | End: 2025-10-19

## 2024-10-19 RX ORDER — CLONIDINE HYDROCHLORIDE 0.1 MG/1
0.1 TABLET ORAL 3 TIMES DAILY
Qty: 90 TABLET | Refills: 11 | Status: SHIPPED | OUTPATIENT
Start: 2024-10-19 | End: 2025-10-19

## 2024-10-19 RX ORDER — LINAGLIPTIN 5 MG/1
5 TABLET, FILM COATED ORAL DAILY
Qty: 90 TABLET | Refills: 3 | Status: SHIPPED | OUTPATIENT
Start: 2024-10-19 | End: 2024-10-19 | Stop reason: HOSPADM

## 2024-10-19 RX ORDER — VALGANCICLOVIR 450 MG/1
450 TABLET, FILM COATED ORAL
Qty: 12 TABLET | Refills: 2 | Status: SHIPPED | OUTPATIENT
Start: 2024-10-21 | End: 2025-01-19

## 2024-10-19 RX ORDER — SULFAMETHOXAZOLE AND TRIMETHOPRIM 400; 80 MG/1; MG/1
1 TABLET ORAL
Qty: 12 TABLET | Refills: 5 | Status: SHIPPED | OUTPATIENT
Start: 2024-10-21 | End: 2025-04-19

## 2024-10-19 RX ORDER — TACROLIMUS 1 MG/1
5 CAPSULE ORAL EVERY 12 HOURS
Qty: 300 CAPSULE | Refills: 11 | Status: SHIPPED | OUTPATIENT
Start: 2024-10-19 | End: 2024-10-21

## 2024-10-19 RX ADMIN — DIPHENHYDRAMINE HYDROCHLORIDE 25 MG: 25 CAPSULE ORAL at 09:10

## 2024-10-19 RX ADMIN — HEPARIN SODIUM 5000 UNITS: 5000 INJECTION INTRAVENOUS; SUBCUTANEOUS at 04:10

## 2024-10-19 RX ADMIN — METHYLPREDNISOLONE SODIUM SUCCINATE 125 MG: 125 INJECTION, POWDER, FOR SOLUTION INTRAMUSCULAR; INTRAVENOUS at 08:10

## 2024-10-19 RX ADMIN — OXYBUTYNIN CHLORIDE 5 MG: 5 TABLET ORAL at 08:10

## 2024-10-19 RX ADMIN — OXYCODONE HYDROCHLORIDE 5 MG: 5 TABLET ORAL at 04:10

## 2024-10-19 RX ADMIN — MYCOPHENOLATE MOFETIL 1000 MG: 250 CAPSULE ORAL at 08:10

## 2024-10-19 RX ADMIN — METHOCARBAMOL 500 MG: 500 TABLET ORAL at 08:10

## 2024-10-19 RX ADMIN — THERA TABS 1 TABLET: TAB at 08:10

## 2024-10-19 RX ADMIN — CARVEDILOL 12.5 MG: 6.25 TABLET, FILM COATED ORAL at 08:10

## 2024-10-19 RX ADMIN — DOCUSATE SODIUM 100 MG: 100 CAPSULE, LIQUID FILLED ORAL at 08:10

## 2024-10-19 RX ADMIN — TACROLIMUS 5 MG: 5 CAPSULE ORAL at 08:10

## 2024-10-19 RX ADMIN — METHOCARBAMOL 500 MG: 500 TABLET ORAL at 02:10

## 2024-10-19 RX ADMIN — CLONIDINE HYDROCHLORIDE 0.1 MG: 0.1 TABLET ORAL at 08:10

## 2024-10-19 RX ADMIN — ACETAMINOPHEN 650 MG: 325 TABLET ORAL at 09:10

## 2024-10-19 RX ADMIN — OXYBUTYNIN CHLORIDE 5 MG: 5 TABLET ORAL at 02:10

## 2024-10-19 RX ADMIN — DOCUSATE SODIUM 100 MG: 100 CAPSULE, LIQUID FILLED ORAL at 02:10

## 2024-10-19 RX ADMIN — NIFEDIPINE 60 MG: 30 TABLET, FILM COATED, EXTENDED RELEASE ORAL at 08:10

## 2024-10-19 RX ADMIN — ANTI-THYMOCYTE GLOBULIN (RABBIT) 100 MG: 5 INJECTION, POWDER, LYOPHILIZED, FOR SOLUTION INTRAVENOUS at 09:10

## 2024-10-19 RX ADMIN — CLONIDINE HYDROCHLORIDE 0.1 MG: 0.1 TABLET ORAL at 02:10

## 2024-10-19 RX ADMIN — MUPIROCIN 1 G: 20 OINTMENT TOPICAL at 08:10

## 2024-10-19 RX ADMIN — HEPARIN SODIUM 5000 UNITS: 5000 INJECTION INTRAVENOUS; SUBCUTANEOUS at 02:10

## 2024-10-19 NOTE — PLAN OF CARE
Pt has wife at bedside attentive to his needs. Pt has call bell in reach, non slip socks on, and bedrails up x2. He is encouraged to wash his hands. Today is thymo dose #2. He is on po oxycodone for pain. Pt bp is high sitting, but he is orthostatic. I have encouraged him to use the incentive spirometer and taught him self meds that he will start tonight. Pt has tele monitoring on in place and is ac/hs. He has a mcnamara catheter draining well. He is passing gas, but no bm yet. I have walked with him in the hallway today and will again this afternoon. Painting the incision with betadine has been taught. Pt to be dc'd today.

## 2024-10-19 NOTE — PROGRESS NOTES
"[facility] - Transplant Stepdown  Endocrinology  Progress Note    Admit Date: 10/16/2024     Reason for Consult: Management of T2DM, Hyperglycemia     Surgical Procedure and Date: tentative plans for kidney transplant on 10/16/24    Diabetes diagnosis year: 2006    Home Diabetes Medications:  Actos 30 mg daily     Lab Results   Component Value Date    HGBA1C 5.2 07/16/2023       How often checking glucose at home?  Daily     BG readings on regimen:   Hypoglycemia on the regimen?  No  Missed doses on regimen?  No    Diabetes Complications include:     Diabetic nephropathy   and Diabetic chronic kidney disease         Complicating diabetes co morbidities:   History of CVA and ESRD      HPI:   Patient is a 58 y.o. male with a diagnosis of AAM with ESRD secondary to diabetic nephropathy. Other PMH includes CVA 2016 suspect related to afib (on apixaban), back surgery 2022, and CAD. He has been on the waitlist for a kidney transplant at [facility] since 1/17/2020. He initially started on HD then changed to PD, then switched back to HD 5/2023. He dialyzes on a TTS schedule, last HD 10/15. Tentative plans for kidney transplant. Endocrinology consulted for management of T2DM.          Interval HPI:   No acute events overnight. Patient in room 95515/69556 A. Blood glucose stable. BG at goal on current insulin regimen (SSI ). Steroid use- Methylprednisolone  and Hydrocortisone  .      Renal function- Abnormal -     Vasopressors-  None      Diet Renal      Eating:   <25%  Nausea: No  Hypoglycemia and intervention: No  Fever: No  TPN and/or TF: No    BP (!) 141/74 (BP Location: Right arm, Patient Position: Sitting)   Pulse 85   Temp 98.6 °F (37 °C) (Oral)   Resp 14   Ht 5' 7" (1.702 m)   Wt 85 kg (187 lb 6.3 oz)   SpO2 97%   BMI 29.35 kg/m²     Labs Reviewed and Include    Recent Labs   Lab 10/19/24  0627   *   CALCIUM 8.1*   ALBUMIN 3.3*      K 4.5   CO2 22*      BUN 47*   CREATININE 4.7*     Lab Results " "  Component Value Date    WBC 3.54 (L) 10/19/2024    HGB 9.8 (L) 10/19/2024    HCT 30.9 (L) 10/19/2024    MCV 84 10/19/2024     (L) 10/19/2024     No results for input(s): "TSH", "FREET4" in the last 168 hours.  Lab Results   Component Value Date    HGBA1C 4.8 10/16/2024       Nutritional status:   Body mass index is 29.35 kg/m².  Lab Results   Component Value Date    ALBUMIN 3.3 (L) 10/19/2024    ALBUMIN 3.3 (L) 10/18/2024    ALBUMIN 3.2 (L) 10/17/2024     No results found for: "PREALBUMIN"    Estimated Creatinine Clearance: 17.9 mL/min (A) (based on SCr of 4.7 mg/dL (H)).    Accu-Checks  Recent Labs     10/16/24  2024 10/17/24  0359 10/17/24  0751 10/17/24  1332 10/17/24  1743 10/17/24  2023 10/18/24  0917 10/18/24  1315 10/18/24  1819 10/18/24  2134   POCTGLUCOSE 81 94 166* 176* 151* 132* 119* 117* 152* 186*       Current Medications and/or Treatments Impacting Glycemic Control  Immunotherapy:    Immunosuppressants           Stop Route Frequency     tacrolimus capsule 5 mg         -- Oral 2 times daily     antithymocyte globulin (rabbit) 100 mg, hydrocortisone sodium succinate (SOLU-CORTEF) 20 mg in 0.9% NaCl 500 mL (FOR PERIPHERAL LINE ADMINISTRATION ONLY)         10/20/24 0859 IV Daily     mycophenolate capsule 1,000 mg         -- Oral 2 times daily          Steroids:   Hormones (From admission, onward)      Start     Stop Route Frequency Ordered    10/20/24 0900  predniSONE tablet 20 mg  (IP TXP KIDNEY POST-OP SIMULECT)         11/16/24 0859 Oral Daily 10/17/24 0358    10/19/24 0900  methylPREDNISolone sodium succinate injection 125 mg  (IP TXP KIDNEY POST-OP SIMULECT)         -- IV Once 10/17/24 0358    10/18/24 0900  antithymocyte globulin (rabbit) 100 mg, hydrocortisone sodium succinate (SOLU-CORTEF) 20 mg in 0.9% NaCl 500 mL (FOR PERIPHERAL LINE ADMINISTRATION ONLY)         10/20/24 0859 IV Daily 10/18/24 0757    10/18/24 0844  hydrocortisone sodium succinate injection 100 mg         03/15/36 1227 " IV Once as needed 10/18/24 0757          Pressors:    Autonomic Drugs (From admission, onward)      Start     Stop Route Frequency Ordered    10/18/24 0829  EPINEPHrine (PF) injection 1 mg         03/15/36 2344 SubQ Once as needed 10/18/24 0757          Hyperglycemia/Diabetes Medications:   Antihyperglycemics (From admission, onward)      Start     Stop Route Frequency Ordered    10/17/24 0443  insulin aspart U-100 pen 0-5 Units  (INSULIN - LOW CORRECTION DOSE (Recommended for BMI <25, GFR<15 or on dialysis, Age > 70, type 1 diabetes, ESLD, severe CHF or patients on <70 units of insulin daily))         -- SubQ Before meals & nightly PRN 10/17/24 0350            ASSESSMENT and PLAN    Renal/  * Status post -donor kidney transplantation  Managed per primary team  Optimize BG control        Endocrine  Class 1 obesity due to excess calories with serious comorbidity and body mass index (BMI) of 30.0 to 30.9 in adult  Body mass index is 29.35 kg/m².  May increase insulin resistance.         Controlled type 2 diabetes mellitus with kidney complication, without long-term current use of insulin  BG goal 140-180  Type 2 diabetes. Status post kidney transplant.  Bg at goal.  Not requiring sliding scale.       Low Dose Correction scale  BG monitoring ac/hs   Will monitor for prandial excursions and consider scheduled Novolog if noted    ** Please call Endocrine for any BG related issues **    Discharge recs:  Patient with type 2 diabetes.  On Actos typically, would hold at discharge.  Recommend a DPP 4i              Hilton Godfrey PA-C  Endocrinology  [facility] - Transplant Stepdown

## 2024-10-19 NOTE — PROGRESS NOTES
Transplant Teaching Book given to patient, Chidi Lisa, on 10/17/2024.  During the course of the hospital stay the patient received information regarding kidney transplant. Teaching and instruction were completed.  Areas that were discussed included: how to contact the Transplant Team, the importance of measuring intake of fluids and urine output, and monitoring vital signs such as blood pressure, temperature, and daily weights.  Parameters for which to report abnormal findings were given.  Appointment were provided along with the rational for the importance of lab work and clinic visits.  A written medication list was provided.  The importance of immunosuppressive medications, their common side effects, and treatment to prevent or minimize side effects has been reviewed.  Signs and symptoms of rejection and infection along with various treatments were reviewed.  The need to avoid infection was discussed.  Wound care and special consideration regarding activities of daily living were explained.  Written and verbal teaching of the above information was given.     Discussed with the patient and caregiver the importance of maintaining COVID-19 precautions; wearing a mask, good handwashing, and social distancing.  Also, to report any signs or symptoms (fever, difficulty breathing, loss of taste/smell, etc.), suspected exposure, or COVID testing, immediately to the transplant program.     Education provided to the patient and his wife, Ashley

## 2024-10-19 NOTE — NURSING
Pt taken by wheelchair to the ER area to be picked up by wife. Pt belongings with wife. AVS discharge papers given to patient. All IVs removed and dressings placed.

## 2024-10-19 NOTE — PROGRESS NOTES
DISCHARGE NOTE:    Chidi Lisa is a 58 y.o. male s/p   RIGHT KIDNEY     Donation after Brain Death   transplant on 10/17/2024 (Kidney) for ESRD secondary to Diabetes Mellitus - Type II.      Past Medical History:   Diagnosis Date    Anticoagulant long-term use     Arthritis     Carotid artery disease     Diabetes mellitus     [facility]    Gout, unspecified     Hypertension     Renal disorder     Stroke 2016    TIA    TIA (transient ischemic attack) 10/04/2019       Hospital Course: Complicated by hypertension, better controlled with current regimen of carvedilol, nifedipine and clonidine.  Hold parameters on blue card.     Allergies: Review of patient's allergies indicates:  No Known Allergies    Patient Pharmacy: [facility]    Discharge Medications:     Medication List        START taking these medications      famotidine 20 MG tablet  Commonly known as: PEPCID  Take 1 tablet (20 mg total) by mouth once daily.     multivitamin Tab  Take 1 tablet by mouth once daily.     mycophenolate 250 mg Cap  Commonly known as: CELLCEPT  Take 4 capsules (1,000 mg total) by mouth 2 (two) times daily.     oxybutynin 5 MG Tab  Commonly known as: DITROPAN  Take 1 tablet (5 mg total) by mouth 3 (three) times daily.     oxyCODONE 10 mg Tab immediate release tablet  Commonly known as: ROXICODONE  Take 1 tablet (10 mg total) by mouth every 6 (six) hours as needed for Pain.     predniSONE 5 MG tablet  Commonly known as: DELTASONE  Take by mouth daily: 10/20 to 10/26 20 mg, 10/27 to 11/2 15 mg, 11/3 to 11/9 10 mg, 11/10/2024 to forever 5 mg,DO NOT STOP     SITagliptin phosphate 25 MG Tab  Commonly known as: JANUVIA  Take 1 tablet (25 mg total) by mouth once daily.     sulfamethoxazole-trimethoprim 400-80mg 400-80 mg per tablet  Commonly known as: BACTRIM,SEPTRA  Take 1 tablet by mouth every Mon, Wed, Fri. Stop 4/17/2025  Start taking on: October 21, 2024      tacrolimus 1 MG Cap  Commonly known as: PROGRAF  Take 5 capsules (5 mg total) by mouth every 12 (twelve) hours.     valGANciclovir 450 mg Tab  Commonly known as: VALCYTE  Take 1 tablet (450 mg total) by mouth every Mon, Wed, Fri. Stop 1/17/2025  Start taking on: October 21, 2024            CHANGE how you take these medications      carvediloL 12.5 MG tablet  Commonly known as: COREG  Take 1 tablet (12.5 mg total) by mouth 2 (two) times daily with meals.  What changed:   medication strength  how much to take  when to take this     cloNIDine 0.1 MG tablet  Commonly known as: CATAPRES  Take 1 tablet (0.1 mg total) by mouth 3 (three) times daily.  What changed:   when to take this  additional instructions            CONTINUE taking these medications      apixaban 2.5 mg Tab  Commonly known as: ELIQUIS  Take 1 tablet (2.5 mg total) by mouth 2 (two) times daily.     atorvastatin 40 MG tablet  Commonly known as: LIPITOR     NIFEdipine 60 MG (OSM) 24 hr tablet  Commonly known as: PROCARDIA-XL  Take 1 tablet (60 mg total) by mouth 2 (two) times a day.            STOP taking these medications      allopurinoL 100 MG tablet  Commonly known as: ZYLOPRIM     calcium carbonate 300 mg (750 mg) Chew  Commonly known as: TUMS     co-enzyme Q-10 30 mg capsule     docusate sodium 100 MG capsule  Commonly known as: COLACE     doxazosin 4 MG tablet  Commonly known as: CARDURA     furosemide 80 MG tablet  Commonly known as: LASIX     NEPHRO-ALLY ORAL     pioglitazone 30 MG tablet  Commonly known as: ACTOS     sildenafiL 50 MG tablet  Commonly known as: VIAGRA     tadalafiL 5 MG tablet  Commonly known as: CIALIS     traMADoL 50 mg tablet  Commonly known as: ULTRAM               Where to Get Your Medications        These medications were sent to [facility]  [address] [place] [id number]      Hours: Always Open Phone: [phone]   carvediloL 12.5 MG tablet  cloNIDine 0.1 MG tablet  famotidine 20 MG  tablet  mycophenolate 250 mg Cap  NIFEdipine 60 MG (OSM) 24 hr tablet  oxybutynin 5 MG Tab  oxyCODONE 10 mg Tab immediate release tablet  predniSONE 5 MG tablet  SITagliptin phosphate 25 MG Tab  sulfamethoxazole-trimethoprim 400-80mg 400-80 mg per tablet  tacrolimus 1 MG Cap  valGANciclovir 450 mg Tab       You can get these medications from any pharmacy    You don't need a prescription for these medications  multivitamin Tab          Pharmacy Interventions/Recommendations:  1) Transplant Immunosuppression: Induction thymo, and maintenance tac/mmf/pred taper    2) Opportunistic Infection prophylaxis: Bactrim x6 mo, valcyte x3 mo    3) Osteoporosis Prevention measures (liver txp): n/a    4) Patient Counseling/Education: Demonstrated the use of the BP cuff, thermometer.    5) Follow-Up/Discharge Needs:    -monitor BP  -discharged with Januvia but does not need to check bsg; adjust dose as renal function improves  -restarting home Eliquis for AFib on Monday -- adjust dose?      6) Patient Assistance Information: none    7) The following medications have been placed on HOLD and should be restarted in the outpatient setting (when appropriate): none    Chidi and his caregiver verbalized their understanding and had the opportunity to ask questions.   related to heightened demand for nutrients

## 2024-10-19 NOTE — PLAN OF CARE
Pt is AAOx 4. Pt is afebrile with complain of pain. Tylenol was given. The incision site is dry intact and cleansed with betadine.Pt and wife taught how to clean the incision. Night med pulled by wife and was 99%. Tele monitor in place (NSR). Bed is in the lowest position, side rails upx2. Non-skid foot wear in place, call light within reach, pt verbalized understanding to call Rn when needed. Hand hygiene is practiced per protocol.  Will continue to monitors.

## 2024-10-19 NOTE — ASSESSMENT & PLAN NOTE
BG goal 140-180  Type 2 diabetes. Status post kidney transplant.  Bg at goal.  Not requiring sliding scale.       Low Dose Correction scale  BG monitoring ac/hs   Will monitor for prandial excursions and consider scheduled Novolog if noted    ** Please call Endocrine for any BG related issues **    Discharge recs:  Patient with type 2 diabetes.  On Actos typically, would hold at discharge.  Recommend a DPP 4i

## 2024-10-19 NOTE — SUBJECTIVE & OBJECTIVE
"Interval HPI:   No acute events overnight. Patient in room 53847/38841 A. Blood glucose stable. BG at goal on current insulin regimen (SSI ). Steroid use- Methylprednisolone  and Hydrocortisone  .      Renal function- Abnormal -     Vasopressors-  None      Diet Renal      Eating:   <25%  Nausea: No  Hypoglycemia and intervention: No  Fever: No  TPN and/or TF: No    BP (!) 141/74 (BP Location: Right arm, Patient Position: Sitting)   Pulse 85   Temp 98.6 °F (37 °C) (Oral)   Resp 14   Ht 5' 7" (1.702 m)   Wt 85 kg (187 lb 6.3 oz)   SpO2 97%   BMI 29.35 kg/m²     Labs Reviewed and Include    Recent Labs   Lab 10/19/24  0627   *   CALCIUM 8.1*   ALBUMIN 3.3*      K 4.5   CO2 22*      BUN 47*   CREATININE 4.7*     Lab Results   Component Value Date    WBC 3.54 (L) 10/19/2024    HGB 9.8 (L) 10/19/2024    HCT 30.9 (L) 10/19/2024    MCV 84 10/19/2024     (L) 10/19/2024     No results for input(s): "TSH", "FREET4" in the last 168 hours.  Lab Results   Component Value Date    HGBA1C 4.8 10/16/2024       Nutritional status:   Body mass index is 29.35 kg/m².  Lab Results   Component Value Date    ALBUMIN 3.3 (L) 10/19/2024    ALBUMIN 3.3 (L) 10/18/2024    ALBUMIN 3.2 (L) 10/17/2024     No results found for: "PREALBUMIN"    Estimated Creatinine Clearance: 17.9 mL/min (A) (based on SCr of 4.7 mg/dL (H)).    Accu-Checks  Recent Labs     10/16/24  2024 10/17/24  0359 10/17/24  0751 10/17/24  1332 10/17/24  1743 10/17/24  2023 10/18/24  0917 10/18/24  1315 10/18/24  1819 10/18/24  2134   POCTGLUCOSE 81 94 166* 176* 151* 132* 119* 117* 152* 186*       Current Medications and/or Treatments Impacting Glycemic Control  Immunotherapy:    Immunosuppressants           Stop Route Frequency     tacrolimus capsule 5 mg         -- Oral 2 times daily     antithymocyte globulin (rabbit) 100 mg, hydrocortisone sodium succinate (SOLU-CORTEF) 20 mg in 0.9% NaCl 500 mL (FOR PERIPHERAL LINE ADMINISTRATION ONLY)      "    10/20/24 0859 IV Daily     mycophenolate capsule 1,000 mg         -- Oral 2 times daily          Steroids:   Hormones (From admission, onward)      Start     Stop Route Frequency Ordered    10/20/24 0900  predniSONE tablet 20 mg  (IP TXP KIDNEY POST-OP SIMULECT)         11/16/24 0859 Oral Daily 10/17/24 0358    10/19/24 0900  methylPREDNISolone sodium succinate injection 125 mg  (IP TXP KIDNEY POST-OP SIMULECT)         -- IV Once 10/17/24 0358    10/18/24 0900  antithymocyte globulin (rabbit) 100 mg, hydrocortisone sodium succinate (SOLU-CORTEF) 20 mg in 0.9% NaCl 500 mL (FOR PERIPHERAL LINE ADMINISTRATION ONLY)         10/20/24 0859 IV Daily 10/18/24 0757    10/18/24 0844  hydrocortisone sodium succinate injection 100 mg         03/15/36 2344 IV Once as needed 10/18/24 0757          Pressors:    Autonomic Drugs (From admission, onward)      Start     Stop Route Frequency Ordered    10/18/24 0844  EPINEPHrine (PF) injection 1 mg         03/15/36 2344 SubQ Once as needed 10/18/24 0757          Hyperglycemia/Diabetes Medications:   Antihyperglycemics (From admission, onward)      Start     Stop Route Frequency Ordered    10/17/24 0443  insulin aspart U-100 pen 0-5 Units  (INSULIN - LOW CORRECTION DOSE (Recommended for BMI <25, GFR<15 or on dialysis, Age > 70, type 1 diabetes, ESLD, severe CHF or patients on <70 units of insulin daily))         -- SubQ Before meals & nightly PRN 10/17/24 8675

## 2024-10-20 ENCOUNTER — PATIENT MESSAGE (OUTPATIENT)
Dept: ENDOCRINOLOGY | Facility: HOSPITAL | Age: 59
End: 2024-10-20
Payer: COMMERCIAL

## 2024-10-20 ENCOUNTER — NURSE TRIAGE (OUTPATIENT)
Dept: ADMINISTRATIVE | Facility: CLINIC | Age: 59
End: 2024-10-20
Payer: COMMERCIAL

## 2024-10-20 DIAGNOSIS — E11.21 CONTROLLED TYPE 2 DIABETES MELLITUS WITH DIABETIC NEPHROPATHY, WITHOUT LONG-TERM CURRENT USE OF INSULIN: Primary | Chronic | ICD-10-CM

## 2024-10-20 NOTE — TELEPHONE ENCOUNTER
Caller states that his catherter came aloose at [facility]. Caller states that he reattached tube and urine is flowing into bag at this time. Caller wanted to know if he needed to go to ED s/p kidney surgery. Caller advised that if catherter is still in place and urine is flowing then there is not a need at this time.  Pt advised home care per protocol and verbalized understanding.   Reason for Disposition   General information question, no triage required and triager able to answer question    Additional Information   Negative: Triager needs further essential information from caller in order to complete triage   Negative: [1] Caller requesting NON-URGENT health information AND [2] PCP's office is the best resource   Negative: Requesting regular office appointment   Negative: Health information question, no triage required and triager able to answer question   Negative: Question about upcoming scheduled surgery, procedure or test, no triage required, and triager able to answer question   Negative: [1] Caller is not with the adult (patient) AND [2] probable NON-URGENT symptoms   Negative: [1] Follow-up call to recent contact AND [2] information only call, no triage required    Protocols used: Information Only Call - No Triage-A-

## 2024-10-20 NOTE — TELEPHONE ENCOUNTER
Patient was discharged home today following a kidney transplant and was reviewing his discharge paperwork, medications and his blue card. Patient had some questions regarding the names of the medications. All questions were answered and patient has all necessary medications in the home. Instructed to call back with additional questions or worsening of symptoms. Patient verbalized understanding.       Reason for Disposition   Health information question, no triage required and triager able to answer question    Protocols used: Information Only Call - No Triage-A-

## 2024-10-21 ENCOUNTER — CLINICAL SUPPORT (OUTPATIENT)
Dept: TRANSPLANT | Facility: CLINIC | Age: 59
End: 2024-10-21
Payer: COMMERCIAL

## 2024-10-21 ENCOUNTER — PATIENT MESSAGE (OUTPATIENT)
Dept: TRANSPLANT | Facility: CLINIC | Age: 59
End: 2024-10-21

## 2024-10-21 ENCOUNTER — LAB VISIT (OUTPATIENT)
Dept: LAB | Facility: HOSPITAL | Age: 59
End: 2024-10-21
Attending: INTERNAL MEDICINE
Payer: COMMERCIAL

## 2024-10-21 VITALS
BODY MASS INDEX: 28.86 KG/M2 | SYSTOLIC BLOOD PRESSURE: 175 MMHG | SYSTOLIC BLOOD PRESSURE: 175 MMHG | BODY MASS INDEX: 28.86 KG/M2 | RESPIRATION RATE: 16 BRPM | HEART RATE: 75 BPM | TEMPERATURE: 97 F | WEIGHT: 183.88 LBS | DIASTOLIC BLOOD PRESSURE: 78 MMHG | HEIGHT: 67 IN | OXYGEN SATURATION: 99 % | RESPIRATION RATE: 16 BRPM | DIASTOLIC BLOOD PRESSURE: 78 MMHG | TEMPERATURE: 97 F | OXYGEN SATURATION: 99 % | WEIGHT: 183.88 LBS | HEIGHT: 67 IN | HEART RATE: 75 BPM

## 2024-10-21 DIAGNOSIS — Z94.0 KIDNEY REPLACED BY TRANSPLANT: ICD-10-CM

## 2024-10-21 DIAGNOSIS — Z94.0 KIDNEY REPLACED BY TRANSPLANT: Primary | ICD-10-CM

## 2024-10-21 DIAGNOSIS — Z94.0 STATUS POST KIDNEY TRANSPLANT: ICD-10-CM

## 2024-10-21 DIAGNOSIS — E83.39 HYPOPHOSPHATEMIA: Primary | ICD-10-CM

## 2024-10-21 DIAGNOSIS — Z57.8 OCCUPATIONAL EXPOSURE TO OTHER RISK FACTORS: ICD-10-CM

## 2024-10-21 LAB
25(OH)D3+25(OH)D2 SERPL-MCNC: 23 NG/ML (ref 30–96)
ALBUMIN SERPL BCP-MCNC: 3.7 G/DL (ref 3.5–5.2)
ANION GAP SERPL CALC-SCNC: 12 MMOL/L (ref 8–16)
BASOPHILS # BLD AUTO: 0.01 K/UL (ref 0–0.2)
BASOPHILS NFR BLD: 0.3 % (ref 0–1.9)
BUN SERPL-MCNC: 42 MG/DL (ref 6–20)
CALCIUM SERPL-MCNC: 9 MG/DL (ref 8.7–10.5)
CHLORIDE SERPL-SCNC: 106 MMOL/L (ref 95–110)
CO2 SERPL-SCNC: 23 MMOL/L (ref 23–29)
CREAT SERPL-MCNC: 2.6 MG/DL (ref 0.5–1.4)
DIFFERENTIAL METHOD BLD: ABNORMAL
EOSINOPHIL # BLD AUTO: 0 K/UL (ref 0–0.5)
EOSINOPHIL NFR BLD: 1 % (ref 0–8)
ERYTHROCYTE [DISTWIDTH] IN BLOOD BY AUTOMATED COUNT: 16.4 % (ref 11.5–14.5)
EST. GFR  (NO RACE VARIABLE): 27.7 ML/MIN/1.73 M^2
GLUCOSE SERPL-MCNC: 119 MG/DL (ref 70–110)
HCT VFR BLD AUTO: 35.7 % (ref 40–54)
HGB BLD-MCNC: 11.6 G/DL (ref 14–18)
IMM GRANULOCYTES # BLD AUTO: 0.03 K/UL (ref 0–0.04)
IMM GRANULOCYTES NFR BLD AUTO: 0.8 % (ref 0–0.5)
LYMPHOCYTES # BLD AUTO: 0.2 K/UL (ref 1–4.8)
LYMPHOCYTES NFR BLD: 4.3 % (ref 18–48)
MAGNESIUM SERPL-MCNC: 2.1 MG/DL (ref 1.6–2.6)
MCH RBC QN AUTO: 27 PG (ref 27–31)
MCHC RBC AUTO-ENTMCNC: 32.5 G/DL (ref 32–36)
MCV RBC AUTO: 83 FL (ref 82–98)
MONOCYTES # BLD AUTO: 0.3 K/UL (ref 0.3–1)
MONOCYTES NFR BLD: 7.6 % (ref 4–15)
NEUTROPHILS # BLD AUTO: 3.4 K/UL (ref 1.8–7.7)
NEUTROPHILS NFR BLD: 86 % (ref 38–73)
NRBC BLD-RTO: 0 /100 WBC
PHOSPHATE SERPL-MCNC: 1.9 MG/DL (ref 2.7–4.5)
PLATELET # BLD AUTO: 149 K/UL (ref 150–450)
PMV BLD AUTO: 10.2 FL (ref 9.2–12.9)
POTASSIUM SERPL-SCNC: 4.7 MMOL/L (ref 3.5–5.1)
PTH-INTACT SERPL-MCNC: 557.5 PG/ML (ref 9–77)
RBC # BLD AUTO: 4.29 M/UL (ref 4.6–6.2)
SODIUM SERPL-SCNC: 141 MMOL/L (ref 136–145)
TACROLIMUS BLD-MCNC: 10.1 NG/ML (ref 5–15)
WBC # BLD AUTO: 3.94 K/UL (ref 3.9–12.7)

## 2024-10-21 PROCEDURE — 83970 ASSAY OF PARATHORMONE: CPT | Performed by: INTERNAL MEDICINE

## 2024-10-21 PROCEDURE — 36415 COLL VENOUS BLD VENIPUNCTURE: CPT | Performed by: INTERNAL MEDICINE

## 2024-10-21 PROCEDURE — 99999 PR PBB SHADOW E&M-EST. PATIENT-LVL III: CPT | Mod: PBBFAC,,,

## 2024-10-21 PROCEDURE — 80069 RENAL FUNCTION PANEL: CPT | Performed by: INTERNAL MEDICINE

## 2024-10-21 PROCEDURE — 80197 ASSAY OF TACROLIMUS: CPT | Performed by: INTERNAL MEDICINE

## 2024-10-21 PROCEDURE — 83735 ASSAY OF MAGNESIUM: CPT | Performed by: INTERNAL MEDICINE

## 2024-10-21 PROCEDURE — 82306 VITAMIN D 25 HYDROXY: CPT | Performed by: INTERNAL MEDICINE

## 2024-10-21 PROCEDURE — 85025 COMPLETE CBC W/AUTO DIFF WBC: CPT | Performed by: INTERNAL MEDICINE

## 2024-10-21 RX ORDER — TACROLIMUS 1 MG/1
4 CAPSULE ORAL EVERY 12 HOURS
Qty: 240 CAPSULE | Refills: 11 | Status: SHIPPED | OUTPATIENT
Start: 2024-10-21 | End: 2024-10-25

## 2024-10-21 SDOH — SOCIAL DETERMINANTS OF HEALTH (SDOH): OCCUPATIONAL EXPOSURE TO OTHER RISK FACTORS: Z57.8

## 2024-10-21 NOTE — PROGRESS NOTES
Clinic Note: First Return to Clinic Post-  Kidney Transplant    Chidi Lisa  is a 58 y.o. male  S/p   RIGHT KIDNEY   transplant on 10/17/2024 (Kidney) for Diabetes Mellitus - Type II.      Discharge Course (Issues/Concerns): no issues; phos was low-- counseled on high phos diet    Objective:   Vitals:    10/21/24 0938   BP: (!) 175/78   Pulse: 75   Resp: 16   Temp: 96.9 °F (36.1 °C)       Met with patient and his caregiver in the clinic to review current medication list.     Current Outpatient Medications   Medication Sig Dispense Refill    apixaban (ELIQUIS) 2.5 mg Tab Take 1 tablet (2.5 mg total) by mouth 2 (two) times daily.      atorvastatin (LIPITOR) 40 MG tablet Take 40 mg by mouth every evening.       carvediloL (COREG) 12.5 MG tablet Take 1 tablet (12.5 mg total) by mouth 2 (two) times daily with meals. 180 tablet 3    cloNIDine (CATAPRES) 0.1 MG tablet Take 1 tablet (0.1 mg total) by mouth 3 (three) times daily. 90 tablet 11    famotidine (PEPCID) 20 MG tablet Take 1 tablet (20 mg total) by mouth once daily. 30 tablet 0    multivitamin Tab Take 1 tablet by mouth once daily.      mycophenolate (CELLCEPT) 250 mg Cap Take 4 capsules (1,000 mg total) by mouth 2 (two) times daily. 240 capsule 2    NIFEdipine (PROCARDIA-XL) 60 MG (OSM) 24 hr tablet Take 1 tablet (60 mg total) by mouth 2 (two) times a day. 60 tablet 5    oxybutynin (DITROPAN) 5 MG Tab Take 1 tablet (5 mg total) by mouth 3 (three) times daily. 30 tablet 0    oxyCODONE (ROXICODONE) 10 mg Tab immediate release tablet Take 1 tablet (10 mg total) by mouth every 6 (six) hours as needed for Pain. 28 tablet 0    predniSONE (DELTASONE) 5 MG tablet Take by mouth daily: 10/20 to 10/26 20 mg, 10/27 to 11/2 15 mg, 11/3 to 11/9 10 mg, 11/10/2024 to forever 5 mg,DO NOT STOP 70 tablet 11    SITagliptin phosphate (JANUVIA) 25 MG Tab Take 1 tablet (25 mg total) by mouth once daily. 90 tablet 3    sulfamethoxazole-trimethoprim 400-80mg (BACTRIM,SEPTRA) 400-80  mg per tablet Take 1 tablet by mouth every Mon, Wed, Fri. Stop 4/17/2025 12 tablet 5    valGANciclovir (VALCYTE) 450 mg Tab Take 1 tablet (450 mg total) by mouth every Mon, Wed, Fri. Stop 1/17/2025 12 tablet 2    k phos di & mono-sod phos mono (K-PHOS-NEUTRAL) 250 mg Tab Take 2 tablets by mouth 3 (three) times daily for 3 days, THEN 2 tablets 2 (two) times a day. (Patient not taking: Reported on 10/21/2024) 120 tablet 11    tacrolimus (PROGRAF) 1 MG Cap Take 4 capsules (4 mg total) by mouth every 12 (twelve) hours. 240 capsule 11     No current facility-administered medications for this visit.       Pharmacy Interventions/Recommendations:     1) Graft Function & Immunosuppression Issues: Tac level pending; graft function good;     2) Opportunistic Infection prophylaxis:   PCP ppx: Bactrim until 4/17/25-- adjust as renal function improves  CMV ppx: valcyte until 1/17/25-- adjust as renal function improves  Fungal ppx: n/a    3) Donor Serologies & Monitoring:     Donor CMV Status:    Donor HCV Status:    Donor HBcAb:    Donor HBV PRISCILLA: Organ record is missing.  Donor HCV PRISCILLA: Organ record is missing.      4) Pain Management & Bowel Regimen: reports that pain is short lived and relieved with oxycodone    5) Blood Pressure Management: prescribed nifedipine XL, clonidine and carvedilol    6) Blood Sugar Management & Follow-up: prescribed Januvia at discharge; endocrine has already reached out to have him submit BG logs    7) Electrolyte Management: phos is low, supplement started per Dr Philip    8) Osteoporosis Prevention Strategy (Liver Transplant):n/a    9) OTHER medication follow-up (patient assistance, held medications, etc): adjust Bactrim and valcyte as renal function improves; apixaban 2.5 mg BID resumed, was a home dose-- consider increasing to 5 mg BID (a fib)    10) Reinforced medication education conducted in the hospital, including medication indications, dosing, administration, side effects, monitoring--  including timing of immunosuppressant levels.     Patient received their FIRST fill of medications from [facility].  Discussed the process for obtaining refills of medications, including verifying the dose and mailing address to have medications delivered.     Chidi and his caregivers verbalized understanding and had the opportunity to ask questions.

## 2024-10-21 NOTE — PROGRESS NOTES
"1ST NURSING VISIT POST DISCHARGE NOTE    1st RN appointment with Chidi Lisa post discharge 10/19/24 s/p kidney transplant 10/17/24.  Patient's spouse accompanied him.  Patient reports incisional pain.  Patient says that he that he is sleeping well.  Incision intact with staples.  Patient has CECILIA drain and mcnamara catheter in place.  Patient that he is able to explain daily incision care and showering instructions.  Reviewed I&O monitoring, measuring, and recording, and the need for hydration (i.e., at least 2 liters of water daily with minimal caffeine and no grapefruit products).  Medication list and rationale were reviewed.  Patient did bring blue medication card and medication bottles for review.  Patient reports that he has stopped Dulcolax and has not continued Colace.  Patient has not had a bowel movement but will begin Miralax today as recommended by PharmD.  Patient expressed understanding of daily care including BID VS, medications, and I&O documentation.  Patient made aware of today's creatinine level: 2.6.  Patient aware that coordinator will review today's labs with a transplant physician and call the patient with any dose changes indicated.  Next lab appointment scheduled for 10/24/24.  First post-operative transplant team appointment with labs scheduled for 11/4/24.    Using the Kidney Transplant Patient Reference Manual, the patient submitted his open book "Self-assessment of Kidney Transplant Patient Knowledge" test, which was completed in the transplant clinic this morning before 1st nursing visit.  This test includes questions regarding critical dose medications commonly used after kidney transplant, medication dosing and side effects, importance of timed lab draws, important signs and symptoms to report 24/7 immediately post-transplant as well as how to contact the transplant team 24/7.    Test Score: 22/25    After completing the test, the patient was given a copy of the Self-assessment Answer " Key to reinforce accurate learning of test content.  Patient expressed his understanding of the value of the information included in the self-assessment test.

## 2024-10-21 NOTE — TELEPHONE ENCOUNTER
Instructed patient in clinic visit to lower tacro to 4 mg twice a day starting tonight.  Dose change was updated on the blue medication card.       ----- Message from Gianfranco Philip MD sent at 10/21/2024 10:14 AM CDT -----  Please lower prograf to 4 mg po bid

## 2024-10-25 ENCOUNTER — PATIENT MESSAGE (OUTPATIENT)
Dept: TRANSPLANT | Facility: CLINIC | Age: 59
End: 2024-10-25
Payer: COMMERCIAL

## 2024-10-25 DIAGNOSIS — Z94.0 STATUS POST KIDNEY TRANSPLANT: ICD-10-CM

## 2024-10-25 RX ORDER — TACROLIMUS 1 MG/1
CAPSULE ORAL
Qty: 210 CAPSULE | Refills: 11 | Status: SHIPPED | OUTPATIENT
Start: 2024-10-25

## 2024-10-25 NOTE — TELEPHONE ENCOUNTER
Received written order from Dr. Hart.  Messaged pt and instructed to adjust prograf dose to 4 mg in the morning and 3 mg at night.  Labs to be done on Monday as scheduled.   Asked pt to contact us with any questions.       ----- Message from Kaylin Hart MD sent at 10/25/2024 11:04 AM CDT -----  Tac to 4/3 mg if it is a true level.  Check lab on Monday

## 2024-10-28 ENCOUNTER — CLINICAL SUPPORT (OUTPATIENT)
Dept: TRANSPLANT | Facility: CLINIC | Age: 59
End: 2024-10-28
Payer: COMMERCIAL

## 2024-10-28 ENCOUNTER — LAB VISIT (OUTPATIENT)
Dept: LAB | Facility: HOSPITAL | Age: 59
End: 2024-10-28
Attending: INTERNAL MEDICINE
Payer: COMMERCIAL

## 2024-10-28 ENCOUNTER — PATIENT MESSAGE (OUTPATIENT)
Dept: TRANSPLANT | Facility: CLINIC | Age: 59
End: 2024-10-28

## 2024-10-28 DIAGNOSIS — Z94.0 KIDNEY REPLACED BY TRANSPLANT: ICD-10-CM

## 2024-10-28 DIAGNOSIS — Z94.0 STATUS POST KIDNEY TRANSPLANT: ICD-10-CM

## 2024-10-28 DIAGNOSIS — E11.21 CONTROLLED TYPE 2 DIABETES MELLITUS WITH DIABETIC NEPHROPATHY, WITHOUT LONG-TERM CURRENT USE OF INSULIN: ICD-10-CM

## 2024-10-28 LAB
ALBUMIN SERPL BCP-MCNC: 3.7 G/DL (ref 3.5–5.2)
ANION GAP SERPL CALC-SCNC: 7 MMOL/L (ref 8–16)
BASOPHILS # BLD AUTO: 0.02 K/UL (ref 0–0.2)
BASOPHILS NFR BLD: 0.2 % (ref 0–1.9)
BUN SERPL-MCNC: 27 MG/DL (ref 6–20)
CALCIUM SERPL-MCNC: 8.5 MG/DL (ref 8.7–10.5)
CHLORIDE SERPL-SCNC: 109 MMOL/L (ref 95–110)
CO2 SERPL-SCNC: 19 MMOL/L (ref 23–29)
CREAT SERPL-MCNC: 1.9 MG/DL (ref 0.5–1.4)
DIFFERENTIAL METHOD BLD: ABNORMAL
EOSINOPHIL # BLD AUTO: 0.1 K/UL (ref 0–0.5)
EOSINOPHIL NFR BLD: 1.4 % (ref 0–8)
ERYTHROCYTE [DISTWIDTH] IN BLOOD BY AUTOMATED COUNT: 16.3 % (ref 11.5–14.5)
EST. GFR  (NO RACE VARIABLE): 40.4 ML/MIN/1.73 M^2
GLUCOSE SERPL-MCNC: 157 MG/DL (ref 70–110)
HCT VFR BLD AUTO: 33.9 % (ref 40–54)
HGB BLD-MCNC: 10.7 G/DL (ref 14–18)
IMM GRANULOCYTES # BLD AUTO: 0.09 K/UL (ref 0–0.04)
IMM GRANULOCYTES NFR BLD AUTO: 1.1 % (ref 0–0.5)
LYMPHOCYTES # BLD AUTO: 0.3 K/UL (ref 1–4.8)
LYMPHOCYTES NFR BLD: 3.6 % (ref 18–48)
MAGNESIUM SERPL-MCNC: 1.9 MG/DL (ref 1.6–2.6)
MCH RBC QN AUTO: 26.8 PG (ref 27–31)
MCHC RBC AUTO-ENTMCNC: 31.6 G/DL (ref 32–36)
MCV RBC AUTO: 85 FL (ref 82–98)
MONOCYTES # BLD AUTO: 0.4 K/UL (ref 0.3–1)
MONOCYTES NFR BLD: 5.1 % (ref 4–15)
NEUTROPHILS # BLD AUTO: 7.4 K/UL (ref 1.8–7.7)
NEUTROPHILS NFR BLD: 88.6 % (ref 38–73)
NRBC BLD-RTO: 0 /100 WBC
PHOSPHATE SERPL-MCNC: 2.6 MG/DL (ref 2.7–4.5)
PLATELET # BLD AUTO: 273 K/UL (ref 150–450)
PMV BLD AUTO: 9.6 FL (ref 9.2–12.9)
POTASSIUM SERPL-SCNC: 5.1 MMOL/L (ref 3.5–5.1)
RBC # BLD AUTO: 4 M/UL (ref 4.6–6.2)
SODIUM SERPL-SCNC: 135 MMOL/L (ref 136–145)
TACROLIMUS BLD-MCNC: 14.1 NG/ML (ref 5–15)
WBC # BLD AUTO: 8.36 K/UL (ref 3.9–12.7)

## 2024-10-28 PROCEDURE — 36415 COLL VENOUS BLD VENIPUNCTURE: CPT | Performed by: INTERNAL MEDICINE

## 2024-10-28 PROCEDURE — 80197 ASSAY OF TACROLIMUS: CPT | Performed by: INTERNAL MEDICINE

## 2024-10-28 PROCEDURE — 80069 RENAL FUNCTION PANEL: CPT | Performed by: INTERNAL MEDICINE

## 2024-10-28 PROCEDURE — 83735 ASSAY OF MAGNESIUM: CPT | Performed by: INTERNAL MEDICINE

## 2024-10-28 PROCEDURE — 85025 COMPLETE CBC W/AUTO DIFF WBC: CPT | Performed by: INTERNAL MEDICINE

## 2024-10-28 RX ORDER — OXYBUTYNIN CHLORIDE 5 MG/1
5 TABLET ORAL 3 TIMES DAILY
Qty: 30 TABLET | Refills: 0 | Status: CANCELLED | OUTPATIENT
Start: 2024-10-28 | End: 2025-10-28

## 2024-10-30 ENCOUNTER — OFFICE VISIT (OUTPATIENT)
Dept: TRANSPLANT | Facility: CLINIC | Age: 59
End: 2024-10-30
Payer: COMMERCIAL

## 2024-10-30 ENCOUNTER — TELEPHONE (OUTPATIENT)
Dept: TRANSPLANT | Facility: CLINIC | Age: 59
End: 2024-10-30
Payer: COMMERCIAL

## 2024-10-30 VITALS
DIASTOLIC BLOOD PRESSURE: 70 MMHG | HEART RATE: 67 BPM | SYSTOLIC BLOOD PRESSURE: 159 MMHG | TEMPERATURE: 97 F | HEIGHT: 67 IN | BODY MASS INDEX: 28.55 KG/M2 | RESPIRATION RATE: 18 BRPM | OXYGEN SATURATION: 99 % | WEIGHT: 181.88 LBS

## 2024-10-30 DIAGNOSIS — R19.7 DIARRHEA, UNSPECIFIED TYPE: ICD-10-CM

## 2024-10-30 DIAGNOSIS — Z94.0 STATUS POST KIDNEY TRANSPLANT: ICD-10-CM

## 2024-10-30 DIAGNOSIS — Z94.0 KIDNEY REPLACED BY TRANSPLANT: Primary | ICD-10-CM

## 2024-10-30 DIAGNOSIS — Z51.81 ENCOUNTER FOR THERAPEUTIC DRUG MONITORING: ICD-10-CM

## 2024-10-30 DIAGNOSIS — D84.9 IMMUNOCOMPROMISED PATIENT: ICD-10-CM

## 2024-10-30 PROCEDURE — 99215 OFFICE O/P EST HI 40 MIN: CPT | Mod: 24,S$GLB,, | Performed by: TRANSPLANT SURGERY

## 2024-10-30 PROCEDURE — 3066F NEPHROPATHY DOC TX: CPT | Mod: CPTII,S$GLB,, | Performed by: TRANSPLANT SURGERY

## 2024-10-30 PROCEDURE — 1111F DSCHRG MED/CURRENT MED MERGE: CPT | Mod: CPTII,S$GLB,, | Performed by: TRANSPLANT SURGERY

## 2024-10-30 PROCEDURE — 4010F ACE/ARB THERAPY RXD/TAKEN: CPT | Mod: CPTII,S$GLB,, | Performed by: TRANSPLANT SURGERY

## 2024-10-30 PROCEDURE — 3008F BODY MASS INDEX DOCD: CPT | Mod: CPTII,S$GLB,, | Performed by: TRANSPLANT SURGERY

## 2024-10-30 PROCEDURE — 3078F DIAST BP <80 MM HG: CPT | Mod: CPTII,S$GLB,, | Performed by: TRANSPLANT SURGERY

## 2024-10-30 PROCEDURE — 1159F MED LIST DOCD IN RCRD: CPT | Mod: CPTII,S$GLB,, | Performed by: TRANSPLANT SURGERY

## 2024-10-30 PROCEDURE — 3077F SYST BP >= 140 MM HG: CPT | Mod: CPTII,S$GLB,, | Performed by: TRANSPLANT SURGERY

## 2024-10-30 PROCEDURE — 3044F HG A1C LEVEL LT 7.0%: CPT | Mod: CPTII,S$GLB,, | Performed by: TRANSPLANT SURGERY

## 2024-10-30 PROCEDURE — 99999 PR PBB SHADOW E&M-EST. PATIENT-LVL IV: CPT | Mod: PBBFAC,,,

## 2024-10-30 RX ORDER — OXYBUTYNIN CHLORIDE 5 MG/1
5 TABLET ORAL 3 TIMES DAILY
Qty: 30 TABLET | Refills: 0 | Status: CANCELLED | OUTPATIENT
Start: 2024-10-28 | End: 2025-10-28

## 2024-11-01 ENCOUNTER — PATIENT MESSAGE (OUTPATIENT)
Dept: TRANSPLANT | Facility: CLINIC | Age: 59
End: 2024-11-01
Payer: COMMERCIAL

## 2024-11-01 ENCOUNTER — TELEPHONE (OUTPATIENT)
Dept: UROLOGY | Facility: CLINIC | Age: 59
End: 2024-11-01
Payer: COMMERCIAL

## 2024-11-01 DIAGNOSIS — E11.21 CONTROLLED TYPE 2 DIABETES MELLITUS WITH DIABETIC NEPHROPATHY, WITHOUT LONG-TERM CURRENT USE OF INSULIN: Chronic | ICD-10-CM

## 2024-11-01 DIAGNOSIS — R73.9 STEROID-INDUCED HYPERGLYCEMIA: Primary | ICD-10-CM

## 2024-11-01 DIAGNOSIS — T38.0X5A STEROID-INDUCED HYPERGLYCEMIA: Primary | ICD-10-CM

## 2024-11-01 DIAGNOSIS — Z94.0 STATUS POST KIDNEY TRANSPLANT: ICD-10-CM

## 2024-11-01 RX ORDER — TACROLIMUS 1 MG/1
2 CAPSULE ORAL EVERY 12 HOURS
Qty: 120 CAPSULE | Refills: 11 | Status: SHIPPED | OUTPATIENT
Start: 2024-11-01

## 2024-11-01 RX ORDER — INSULIN ASPART 100 [IU]/ML
INJECTION, SOLUTION INTRAVENOUS; SUBCUTANEOUS
Qty: 7.2 ML | Refills: 11 | Status: SHIPPED | OUTPATIENT
Start: 2024-11-01 | End: 2025-11-01

## 2024-11-04 ENCOUNTER — LAB VISIT (OUTPATIENT)
Dept: LAB | Facility: HOSPITAL | Age: 59
End: 2024-11-04
Payer: COMMERCIAL

## 2024-11-04 ENCOUNTER — OFFICE VISIT (OUTPATIENT)
Dept: TRANSPLANT | Facility: CLINIC | Age: 59
End: 2024-11-04
Payer: COMMERCIAL

## 2024-11-04 ENCOUNTER — PROCEDURE VISIT (OUTPATIENT)
Dept: UROLOGY | Facility: CLINIC | Age: 59
End: 2024-11-04
Payer: COMMERCIAL

## 2024-11-04 ENCOUNTER — PATIENT MESSAGE (OUTPATIENT)
Dept: TRANSPLANT | Facility: CLINIC | Age: 59
End: 2024-11-04

## 2024-11-04 VITALS
DIASTOLIC BLOOD PRESSURE: 66 MMHG | WEIGHT: 174 LBS | SYSTOLIC BLOOD PRESSURE: 133 MMHG | HEART RATE: 70 BPM | TEMPERATURE: 98 F | RESPIRATION RATE: 20 BRPM | BODY MASS INDEX: 27.25 KG/M2

## 2024-11-04 VITALS
SYSTOLIC BLOOD PRESSURE: 126 MMHG | DIASTOLIC BLOOD PRESSURE: 64 MMHG | HEIGHT: 67 IN | BODY MASS INDEX: 27.37 KG/M2 | TEMPERATURE: 98 F | WEIGHT: 174.38 LBS | HEART RATE: 74 BPM | RESPIRATION RATE: 16 BRPM | OXYGEN SATURATION: 100 %

## 2024-11-04 DIAGNOSIS — Z94.0 KIDNEY REPLACED BY TRANSPLANT: ICD-10-CM

## 2024-11-04 DIAGNOSIS — T38.0X5A STEROID-INDUCED HYPERGLYCEMIA: ICD-10-CM

## 2024-11-04 DIAGNOSIS — E11.21 CONTROLLED TYPE 2 DIABETES MELLITUS WITH DIABETIC NEPHROPATHY, WITHOUT LONG-TERM CURRENT USE OF INSULIN: Chronic | ICD-10-CM

## 2024-11-04 DIAGNOSIS — Z79.01 LONG TERM (CURRENT) USE OF ANTICOAGULANTS: Chronic | ICD-10-CM

## 2024-11-04 DIAGNOSIS — Z91.89 AT RISK FOR OPPORTUNISTIC INFECTIONS: ICD-10-CM

## 2024-11-04 DIAGNOSIS — Z79.899 IMMUNOSUPPRESSIVE MANAGEMENT ENCOUNTER FOLLOWING KIDNEY TRANSPLANT: ICD-10-CM

## 2024-11-04 DIAGNOSIS — Z94.0 STATUS POST DECEASED-DONOR KIDNEY TRANSPLANTATION: ICD-10-CM

## 2024-11-04 DIAGNOSIS — N18.32 CKD STAGE 3B, GFR 30-44 ML/MIN: Primary | ICD-10-CM

## 2024-11-04 DIAGNOSIS — Z94.0 STATUS POST KIDNEY TRANSPLANT: ICD-10-CM

## 2024-11-04 DIAGNOSIS — D63.8 ANEMIA OF CHRONIC DISEASE: ICD-10-CM

## 2024-11-04 DIAGNOSIS — R73.9 STEROID-INDUCED HYPERGLYCEMIA: ICD-10-CM

## 2024-11-04 DIAGNOSIS — I15.0 RENOVASCULAR HYPERTENSION: ICD-10-CM

## 2024-11-04 DIAGNOSIS — Z94.0 IMMUNOSUPPRESSIVE MANAGEMENT ENCOUNTER FOLLOWING KIDNEY TRANSPLANT: ICD-10-CM

## 2024-11-04 LAB
ALBUMIN SERPL BCP-MCNC: 4 G/DL (ref 3.5–5.2)
ANION GAP SERPL CALC-SCNC: 5 MMOL/L (ref 8–16)
BASOPHILS # BLD AUTO: 0.04 K/UL (ref 0–0.2)
BASOPHILS NFR BLD: 0.6 % (ref 0–1.9)
BUN SERPL-MCNC: 25 MG/DL (ref 6–20)
CALCIUM SERPL-MCNC: 9.7 MG/DL (ref 8.7–10.5)
CHLORIDE SERPL-SCNC: 112 MMOL/L (ref 95–110)
CO2 SERPL-SCNC: 19 MMOL/L (ref 23–29)
CREAT SERPL-MCNC: 1.8 MG/DL (ref 0.5–1.4)
DIFFERENTIAL METHOD BLD: ABNORMAL
EOSINOPHIL # BLD AUTO: 0.1 K/UL (ref 0–0.5)
EOSINOPHIL NFR BLD: 1.9 % (ref 0–8)
ERYTHROCYTE [DISTWIDTH] IN BLOOD BY AUTOMATED COUNT: 16.7 % (ref 11.5–14.5)
EST. GFR  (NO RACE VARIABLE): 42.8 ML/MIN/1.73 M^2
GLUCOSE SERPL-MCNC: 176 MG/DL (ref 70–110)
HCT VFR BLD AUTO: 35.6 % (ref 40–54)
HGB BLD-MCNC: 11.5 G/DL (ref 14–18)
IMM GRANULOCYTES # BLD AUTO: 0.04 K/UL (ref 0–0.04)
IMM GRANULOCYTES NFR BLD AUTO: 0.6 % (ref 0–0.5)
LYMPHOCYTES # BLD AUTO: 0.4 K/UL (ref 1–4.8)
LYMPHOCYTES NFR BLD: 6.9 % (ref 18–48)
MAGNESIUM SERPL-MCNC: 1.5 MG/DL (ref 1.6–2.6)
MCH RBC QN AUTO: 27.1 PG (ref 27–31)
MCHC RBC AUTO-ENTMCNC: 32.3 G/DL (ref 32–36)
MCV RBC AUTO: 84 FL (ref 82–98)
MONOCYTES # BLD AUTO: 0.4 K/UL (ref 0.3–1)
MONOCYTES NFR BLD: 6.5 % (ref 4–15)
NEUTROPHILS # BLD AUTO: 5.2 K/UL (ref 1.8–7.7)
NEUTROPHILS NFR BLD: 83.5 % (ref 38–73)
NRBC BLD-RTO: 0 /100 WBC
PHOSPHATE SERPL-MCNC: 1.8 MG/DL (ref 2.7–4.5)
PLATELET # BLD AUTO: 234 K/UL (ref 150–450)
PMV BLD AUTO: 9.6 FL (ref 9.2–12.9)
POTASSIUM SERPL-SCNC: 5.9 MMOL/L (ref 3.5–5.1)
RBC # BLD AUTO: 4.24 M/UL (ref 4.6–6.2)
SODIUM SERPL-SCNC: 136 MMOL/L (ref 136–145)
TACROLIMUS BLD-MCNC: 7.5 NG/ML (ref 5–15)
WBC # BLD AUTO: 6.27 K/UL (ref 3.9–12.7)

## 2024-11-04 PROCEDURE — 80197 ASSAY OF TACROLIMUS: CPT | Performed by: INTERNAL MEDICINE

## 2024-11-04 PROCEDURE — 85025 COMPLETE CBC W/AUTO DIFF WBC: CPT | Performed by: INTERNAL MEDICINE

## 2024-11-04 PROCEDURE — 36415 COLL VENOUS BLD VENIPUNCTURE: CPT | Performed by: INTERNAL MEDICINE

## 2024-11-04 PROCEDURE — 83735 ASSAY OF MAGNESIUM: CPT | Performed by: INTERNAL MEDICINE

## 2024-11-04 PROCEDURE — 80069 RENAL FUNCTION PANEL: CPT | Performed by: INTERNAL MEDICINE

## 2024-11-04 PROCEDURE — 99999 PR PBB SHADOW E&M-EST. PATIENT-LVL V: CPT | Mod: PBBFAC,,, | Performed by: INTERNAL MEDICINE

## 2024-11-04 PROCEDURE — 52310 CYSTOSCOPY AND TREATMENT: CPT | Mod: S$GLB,,, | Performed by: UROLOGY

## 2024-11-04 RX ORDER — SULFAMETHOXAZOLE AND TRIMETHOPRIM 800; 160 MG/1; MG/1
1 TABLET ORAL
Status: COMPLETED | OUTPATIENT
Start: 2024-11-04 | End: 2024-11-04

## 2024-11-04 RX ORDER — SULFAMETHOXAZOLE AND TRIMETHOPRIM 400; 80 MG/1; MG/1
1 TABLET ORAL DAILY
Qty: 30 TABLET | Refills: 5 | Status: CANCELLED | OUTPATIENT
Start: 2024-11-04 | End: 2025-05-03

## 2024-11-04 RX ORDER — LIDOCAINE HYDROCHLORIDE 20 MG/ML
JELLY TOPICAL
Status: COMPLETED | OUTPATIENT
Start: 2024-11-04 | End: 2024-11-04

## 2024-11-04 RX ORDER — PEN NEEDLE, DIABETIC 30 GX3/16"
1 NEEDLE, DISPOSABLE MISCELLANEOUS 3 TIMES DAILY
Qty: 100 EACH | Refills: 5 | Status: SHIPPED | OUTPATIENT
Start: 2024-11-04

## 2024-11-04 RX ORDER — OXYBUTYNIN CHLORIDE 5 MG/1
5 TABLET ORAL 3 TIMES DAILY
Qty: 30 TABLET | Refills: 0 | Status: CANCELLED | OUTPATIENT
Start: 2024-10-28 | End: 2025-10-28

## 2024-11-04 RX ORDER — VALGANCICLOVIR 450 MG/1
450 TABLET, FILM COATED ORAL DAILY
Qty: 30 TABLET | Refills: 2 | Status: SHIPPED | OUTPATIENT
Start: 2024-11-04 | End: 2025-02-02

## 2024-11-04 RX ORDER — SODIUM BICARBONATE 650 MG/1
1300 TABLET ORAL 3 TIMES DAILY
Qty: 180 TABLET | Refills: 11 | Status: SHIPPED | OUTPATIENT
Start: 2024-11-04 | End: 2025-11-04

## 2024-11-04 RX ORDER — INSULIN ASPART 100 [IU]/ML
INJECTION, SOLUTION INTRAVENOUS; SUBCUTANEOUS
Qty: 6 ML | Refills: 11 | Status: SHIPPED | OUTPATIENT
Start: 2024-11-04 | End: 2025-11-04

## 2024-11-04 RX ADMIN — LIDOCAINE HYDROCHLORIDE 5 ML: 20 JELLY TOPICAL at 08:11

## 2024-11-04 RX ADMIN — SULFAMETHOXAZOLE AND TRIMETHOPRIM 1 TABLET: 800; 160 TABLET ORAL at 09:11

## 2024-11-04 NOTE — PATIENT INSTRUCTIONS
What to Expect After a Cystoscopy with Stent Removal  For the next 24-48 hours, you may feel a mild burning when you urinate. This burning is normal and expected. Drink plenty of water to dilute the urine to help relieve the burning sensation. You may also see a small amount of blood in your urine after the procedure.    Unless you are already taking antibiotics, you may be given an antibiotic after the test to prevent infection.    Signs and Symptoms to Report  Call the [facility] at [phone] if you develop any of the following:  Fever of 101 degrees or higher  Chills or persistent bleeding  Inability to urinate    After hours or on weekends, you may reach a urology resident on call at this number: [phone].

## 2024-11-04 NOTE — LETTER
November 4, 2024        Kamran Adorno  [address]  [address]  [place] [address]  Phone: [phone]  Fax: [phone]             [facility]- Transplant 1st Fl  [address]  [facility] [address]  Phone: [phone]   Patient: Chidi Lisa   MR Number: [id number]   YOB: 1965   Date of Visit: 11/4/2024       Dear Dr. Kamran Adorno    Thank you for referring Chidi Lisa to me for evaluation. Attached you will find relevant portions of my assessment and plan of care.    If you have questions, please do not hesitate to call me. I look forward to following Chidi Lisa along with you.    Sincerely,    Gianfranco Philip MD    Enclosure    If you would like to receive this communication electronically, please contact [email or web address] or [phone] to request [employer] access.    [employer] is a tool which provides read-only access to select patient information with whom you have a relationship. Its easy to use and provides real time access to review your patients record including encounter summaries, notes, results, and demographic information.    If you feel you have received this communication in error or would no longer like to receive these types of communications, please e-mail [email or web address]

## 2024-11-04 NOTE — PROCEDURES
CYSTOSCOPY W/ STENT REMOVAL    Date/Time: 11/4/2024 1:15 PM    Performed by: Heri Romero MD  Authorized by: Vahe Jolley Jr., MD          Office Cystoscopy with Stent Removal Procedure Note    Date of Procedure: 11/04/2024    Indication:  Indwelling Ureteral Stent     Informed consent:  The risks, benefits, complications, treatment options, and expected outcomes were discussed with the patient. The patient concurred with the proposed plan and provided informed consent.     Prior to the procedure, I reviewed pertinent imaging and operative notes, confirming that the patient has ONE ureteral stent    Anesthesia: Lidocaine jelly 2%     Antibiotic prophylaxis: Bactrim    Procedure:  The patient was placed in the lithotomy position, was prepped and draped in the usual manner using sterile technique, and 2% lidocaine jelly instilled into the urethra.  A procedural timeout was performed identifying the patient, all in attendance were in agreement, including the patient. A 17 F flexible cystoscope was then inserted into the urethra and the urethra and bladder carefully examined.  The findings below were noted. The stent was grasped using flexible gasping forceps, and removed intact through the meatus. The stent was examined on the backtable to confirm no fragmentation, and that the stent was removed in its entirety.    Findings:   1. Normal anterior urethra without evidence of strictures or tumors   2. Prostate open without signs of obstruction  3. Bladder free of masses, tumors, lesions.  Ureteral orifices in orthotopic position bilaterally        Specimens: None   Complications: None; patient tolerated the procedure well          Disposition: Home after brief observation   Condition: Stable     Assessment: 59M s/p renal transplant    Plan: follow up in transplant nephrology    Attending Attestation:      I personally performed the procedure.     Heri Romero  8:47 AM  11/04/2024

## 2024-11-04 NOTE — PROGRESS NOTES
Kidney Post-Transplant Assessment    Referring Physician: Kamran Adorno  Current Nephrologist: Kamran Adorno    ORGAN: RIGHT KIDNEY  Donor Type: donation after brain death  PHS Increased Risk: no  Cold Ischemia: 653 mins  Induction Medications: Thymo    Subjective:     CC:  Reassessment of renal allograft function and management of chronic immunosuppression.    HPI:  Mr. Lisa is a 59 y.o. year old Black or  male who received a donation after brain death kidney transplant on 10/17/24. His most recent creatinine is 2. He takes mycophenolate mofetil, prednisone, and tacrolimus for maintenance immunosuppression. His post transplant course has been uncomplicated to date.    s/p DBD Ktxp 10/17/24 (out OR 4AM) for DM. (CIT 10h 53m, CMV + +, KDPI 50%.) Per op note, bladder was small and the mucosa very thin. During the suture a 50% tear was identified and the anastomosis was repeated, this time taking mucosa and muscle in one layer. A superficial muscle layer was created using 6-0 PDS but it was not antireflux procedure. 10 day Arreola. 1 drain.      He feels fine  No concerns  No chest pain or SOB  No nausea vomit or diarrhea  Very pleased with the kidney function     Current Outpatient Medications on File Prior to Visit   Medication Sig Dispense Refill    apixaban (ELIQUIS) 2.5 mg Tab Take 1 tablet (2.5 mg total) by mouth 2 (two) times daily.      atorvastatin (LIPITOR) 40 MG tablet Take 40 mg by mouth every evening.       carvediloL (COREG) 12.5 MG tablet Take 1 tablet (12.5 mg total) by mouth 2 (two) times daily with meals. 180 tablet 3    cloNIDine (CATAPRES) 0.1 MG tablet Take 1 tablet (0.1 mg total) by mouth 3 (three) times daily. 90 tablet 11    famotidine (PEPCID) 20 MG tablet Take 1 tablet (20 mg total) by mouth once daily. 30 tablet 0    insulin aspart U-100 (NOVOLOG FLEXPEN U-100 INSULIN) 100 unit/mL (3 mL) InPn pen Inject 3 Units into the skin 3 (three) times daily with meals. May  also inject 0-5 Units as needed (for hyperglycemia). Add correction scale if needed.Blood sugar 180 to 230 add 1 units, Blood sugar 231 to 280 add 2 units, Blood sugar 281 to 330 add 3 units, Blood sugar 331 to 380 add 4 units, Blood sugar greater than 380 add 5 units.  Total max daily dose of 24 units. 7.2 mL 11    multivitamin Tab Take 1 tablet by mouth once daily.      mycophenolate (CELLCEPT) 250 mg Cap Take 4 capsules (1,000 mg total) by mouth 2 (two) times daily. 240 capsule 2    NIFEdipine (PROCARDIA-XL) 60 MG (OSM) 24 hr tablet Take 1 tablet (60 mg total) by mouth 2 (two) times a day. 60 tablet 5    oxybutynin (DITROPAN) 5 MG Tab Take 1 tablet (5 mg total) by mouth 3 (three) times daily. 30 tablet 0    oxyCODONE (ROXICODONE) 10 mg Tab immediate release tablet Take 1 tablet (10 mg total) by mouth every 6 (six) hours as needed for Pain. (Patient not taking: Reported on 10/30/2024) 28 tablet 0    predniSONE (DELTASONE) 5 MG tablet Take by mouth daily: 10/20 to 10/26 20 mg, 10/27 to 11/2 15 mg, 11/3 to 11/9 10 mg, 11/10/2024 to forever 5 mg,DO NOT STOP 70 tablet 11    SITagliptin phosphate (JANUVIA) 100 MG Tab Take 1 tablet (100 mg total) by mouth once daily. 90 tablet 3    sulfamethoxazole-trimethoprim 400-80mg (BACTRIM,SEPTRA) 400-80 mg per tablet Take 1 tablet by mouth every Mon, Wed, Fri. Stop 4/17/2025 12 tablet 5    tacrolimus (PROGRAF) 1 MG Cap Take 2 capsules (2 mg total) by mouth every 12 (twelve) hours. 120 capsule 11    valGANciclovir (VALCYTE) 450 mg Tab Take 1 tablet (450 mg total) by mouth every Mon, Wed, Fri. Stop 1/17/2025 12 tablet 2     No current facility-administered medications on file prior to visit.        Review of Systems    Skin: no skin rash  CNS; no headaches, blurred vision, seizure, or syncope  ENT: No JVD,  Adenopathies,  nasal congestion. No oral lesions  Cardiac: No chest pain, dyspnea, claudication, edema or palpitations  Respiratory: No SOB, cough, hemoptysis  "  Gastro-intestinal: No diarrhea, constipation, abdominal pain, nausea, vomit. No ascitis  Genitourinary: no hematuria, dysuria, frequency, frequency  Musculoskeletal: joint pain, arthritis or vasculitic changes  Psych: alert awake, oriented, No cranial nerves deficit.      Objective:       Physical Exam    /64 (BP Location: Right arm, Patient Position: Standing)   Pulse 74   Temp 97.7 °F (36.5 °C) (Oral)   Resp 16   Ht 5' 7" (1.702 m)   Wt 79.1 kg (174 lb 6.1 oz)   SpO2 100%   BMI 27.31 kg/m²       Head: normocephalic  Neck: No JVD, cervical axillary, or femoral adenopathies  Heart: no murmurs, Normal s1 and s2, No gallops, no rubs, No murmurs  Lungs; CTA, good respiratory effort, no crackles  Abdomen: soft, non tender, no splenomegaly or hepatomegaly, no massess, no bruits  Extremities: No edema, skin rash, joint pain  SNC: awake, alert oriented. Cranial nerves are intact, no focalized, sensitivity and strength preserved      Labs:  Lab Results   Component Value Date    WBC 6.24 10/31/2024    HGB 10.7 (L) 10/31/2024    HCT 34.1 (L) 10/31/2024     10/31/2024    K 5.2 (H) 10/31/2024     (H) 10/31/2024    CO2 19 (L) 10/31/2024    BUN 25 (H) 10/31/2024    CREATININE 2.0 (H) 10/31/2024    EGFRNORACEVR 38.0 (A) 10/31/2024    CALCIUM 9.3 10/31/2024    PHOS 2.7 10/31/2024    MG 1.8 10/31/2024    ALBUMIN 4.0 10/31/2024    AST 18 10/16/2024    ALT 9 (L) 10/16/2024    UTPCR 2.09 (H) 10/16/2024    .5 (H) 10/21/2024    TACROLIMUS 12.3 10/31/2024       No results found for: "EXTANC", "EXTWBC", "EXTSEGS", "EXTPLATELETS", "EXTHEMOGLOBI", "EXTHEMATOCRI", "EXTCREATININ", "EXTSODIUM", "EXTPOTASSIUM", "EXTBUN", "EXTCO2", "EXTCALCIUM", "EXTPHOSPHORU", "EXTGLUCOSE", "EXTALBUMIN", "EXTAST", "EXTALT", "EXTBILITOTAL", "EXTLIPASE", "EXTAMYLASE"    No results found for: "EXTCYCLOSLVL", "EXTSIROLIMUS", "EXTTACROLVL", "EXTPROTCRE", "EXTPTHINTACT", "EXTPROTEINUA", "EXTWBCUA", "EXTRBCUA"    Labs were reviewed " "with the patient    Assessment:     1. CKD stage 3b, GFR 30-44 ml/min    2. At risk for opportunistic infections    3. Long term (current) use of anticoagulants--Eliquis    4. Status post -donor kidney transplantation    5. Renovascular hypertension    6. Immunosuppressive management encounter following kidney transplant    7. Anemia of chronic disease        Plan:      Lokelma 10 gm tid  Low K diet  Sodium bicarbonate 1300 tid  Hold bactrim  Hold KPN  Increase valcyte to 450 daily   Labs Wednesday  Prograf level pending   1. CKD stage 3b with stable creatinine: will continue follow up as per our center guidelines. patient to continue close follow up with the local General nephrologist. Education provided in appropriate fluid intake, potassium intake. Continue with oral hydration.    1A. Pancreatic Function: N/A for non-pancreas allograft recipients:     2. High risk immunosuppression medication for organ transplantation, requiring regular intensive follow up and monitoring : tacro level elevated, dose adjusted will repeat level  MMF 1000 bid prednisone per protocol.  Lab Results   Component Value Date    TACROLIMUS 12.3 10/31/2024    TACROLIMUS 14.1 10/28/2024    TACROLIMUS 14.8 10/24/2024     No results found for: "CYCLOSPORINE"  @   Will closely monitor for toxicities, education provided about adherence to medicines and need to communicate any side effects to the transplant nurse or physician.    3. Allograft Function:stable at baseline for the patient. Continue follow up as per our guidelines and with the local General nephrologist. Communication will be sent today.  Lab Results   Component Value Date    CREATININE 2.0 (H) 10/31/2024    CREATININE 1.9 (H) 10/28/2024    CREATININE 2.1 (H) 10/24/2024     No results found for: "AMYLASE", "LIPASE"    4. Hypertension management: well controlled, ontinue with home blood pressure monitoring, low salt and healthy life discussed with the patient..    5. Metabolic " "Bone Disease/Secondary Hyperparathyroidism:calcium and phosphorus level discussed with the patient, patient will continue follow up with the general nephrologist for management of metabolic bone disease. Will monitor PTH and Vit D per our transplant center guidelines.      Lab Results   Component Value Date    .5 (H) 10/21/2024    CALCIUM 9.3 10/31/2024    PHOS 2.7 10/31/2024    PHOS 2.6 (L) 10/28/2024    PHOS 2.9 10/24/2024       6. Electrolytes and acid base balance: reviewed with the patient, essentially within the normal range no need for acute changes today, will monitor as per our center guidelines.     Lab Results   Component Value Date     10/31/2024    K 5.2 (H) 10/31/2024     (H) 10/31/2024    CO2 19 (L) 10/31/2024    CO2 19 (L) 10/28/2024    CO2 22 (L) 10/24/2024       7. Anemia: will continue monitoring as per our center guidelines. No indication for acute intervention today.     Lab Results   Component Value Date    WBC 6.24 10/31/2024    HGB 10.7 (L) 10/31/2024    HCT 34.1 (L) 10/31/2024    MCV 86 10/31/2024     10/31/2024       8.Proteinuria: will continue with pr/cr ratio as per our center guidelines.  Lab Results   Component Value Date    PROTEINURINE 136 (H) 10/16/2024    CREATRANDUR 65.0 10/16/2024    UTPCR 2.09 (H) 10/16/2024    UTPCR 3.90 (H) 10/01/2019    UTPCR 4.86 (H) 11/28/2018        9. BK virus infection screening: will continue with urine or blood PCR as per our guidelines to prevent BK virus viremia and allograft dysfunction  No results found for: "BKVIRUSDNAUR", "BKQUANTURINE", "BKVIRUSLOG", "BKVIRUSURINE", "BKVIRUSPCRQB"      10. Weight and metabolic management: education provided provided during the clinic visit.   There is no height or weight on file to calculate BMI.       11.Patient safety education regarding immunosuppression including prophylaxis posttransplant for CMV, PCP : Education provided about vaccination and prevention of infections.    12.  " Cytopenias: no significant cytopenias will monitor as per our guidelines. Medicine list reviewed including potential causes of drug-induced cytopenias     Lab Results   Component Value Date    WBC 6.24 10/31/2024    HGB 10.7 (L) 10/31/2024    HCT 34.1 (L) 10/31/2024    MCV 86 10/31/2024     10/31/2024       13. Post-transplant Prophylaxis; CMV Infection, PJP and Candida mucosistis and other indicated for this particular patient. Valcyte increase to 450 daily  and hold Bactrim for now, will ask Pharm D to review dose due to improved GFR .    I spoke with the patient for 30 minutes. More than half dedicated to counseling and education. All questions answered    Gianfranco Philip MD  Transplant Nephrology            Follow-up:   Clinic: return to transplant clinic weekly for the first month after transplant; every 2 weeks during months 2-3; then at 6-, 9-, 12-, 18-, 24-, and 36- months post-transplant to reassess for complications from immunosuppression toxicity and monitor for rejection.  Annually thereafter.    Labs: since patient remains at high risk for rejection and drug-related complications that warrant close monitoring, labs will be ordered as follows: continue twice weekly CBC, renal panel, and drug level for first month; then same labs once weekly through 3rd month post-transplant.  Urine for UA and protein/creatinine ratio monthly.  Serum BK - PCR at 1-, 3-, 6-, 9-, 12-, 18-, 24-, 36-, 48-, and 60 months post-transplant.  Hepatic panel at 1-, 2-, 3-, 6-, 9-, 12-, 18-, 24-, and 36- months post-transplant.    Gianfranco Philip MD       Education:   Material provided to the patient.  Patient reminded to call with any health changes since these can be early signs of significant complications.  Also, I advised the patient to be sure any new medications or changes of old medications are discussed with either a pharmacist or physician knowledgeable with transplant to avoid rejection/drug toxicity related to  significant drug interactions.    Patient advised that it is recommended that all transplanted patients, and their close contacts and household members receive Covid vaccination.

## 2024-11-05 DIAGNOSIS — Z94.0 STATUS POST KIDNEY TRANSPLANT: ICD-10-CM

## 2024-11-05 RX ORDER — OXYBUTYNIN CHLORIDE 5 MG/1
5 TABLET ORAL 3 TIMES DAILY
Qty: 30 TABLET | Refills: 0 | Status: CANCELLED | OUTPATIENT
Start: 2024-10-28 | End: 2025-10-28

## 2024-11-05 RX ORDER — PEN NEEDLE, DIABETIC 30 GX3/16"
1 NEEDLE, DISPOSABLE MISCELLANEOUS 4 TIMES DAILY
Qty: 200 EACH | Refills: 11 | Status: SHIPPED | OUTPATIENT
Start: 2024-11-05

## 2024-11-06 ENCOUNTER — OFFICE VISIT (OUTPATIENT)
Dept: TRANSPLANT | Facility: CLINIC | Age: 59
End: 2024-11-06
Payer: COMMERCIAL

## 2024-11-06 VITALS
OXYGEN SATURATION: 100 % | DIASTOLIC BLOOD PRESSURE: 65 MMHG | HEIGHT: 67 IN | BODY MASS INDEX: 27.71 KG/M2 | WEIGHT: 176.56 LBS | TEMPERATURE: 97 F | SYSTOLIC BLOOD PRESSURE: 137 MMHG | HEART RATE: 61 BPM

## 2024-11-06 DIAGNOSIS — Z51.81 ENCOUNTER FOR THERAPEUTIC DRUG MONITORING: Primary | ICD-10-CM

## 2024-11-06 DIAGNOSIS — Z94.0 STATUS POST KIDNEY TRANSPLANT: ICD-10-CM

## 2024-11-06 DIAGNOSIS — Z94.0 KIDNEY REPLACED BY TRANSPLANT: ICD-10-CM

## 2024-11-06 PROCEDURE — 3075F SYST BP GE 130 - 139MM HG: CPT | Mod: CPTII,S$GLB,, | Performed by: SURGERY

## 2024-11-06 PROCEDURE — 1111F DSCHRG MED/CURRENT MED MERGE: CPT | Mod: CPTII,S$GLB,, | Performed by: SURGERY

## 2024-11-06 PROCEDURE — 99213 OFFICE O/P EST LOW 20 MIN: CPT | Mod: 24,S$GLB,, | Performed by: SURGERY

## 2024-11-06 PROCEDURE — 3008F BODY MASS INDEX DOCD: CPT | Mod: CPTII,S$GLB,, | Performed by: SURGERY

## 2024-11-06 PROCEDURE — 1159F MED LIST DOCD IN RCRD: CPT | Mod: CPTII,S$GLB,, | Performed by: SURGERY

## 2024-11-06 PROCEDURE — 3066F NEPHROPATHY DOC TX: CPT | Mod: CPTII,S$GLB,, | Performed by: SURGERY

## 2024-11-06 PROCEDURE — 3078F DIAST BP <80 MM HG: CPT | Mod: CPTII,S$GLB,, | Performed by: SURGERY

## 2024-11-06 PROCEDURE — 3044F HG A1C LEVEL LT 7.0%: CPT | Mod: CPTII,S$GLB,, | Performed by: SURGERY

## 2024-11-06 PROCEDURE — 1160F RVW MEDS BY RX/DR IN RCRD: CPT | Mod: CPTII,S$GLB,, | Performed by: SURGERY

## 2024-11-06 PROCEDURE — 4010F ACE/ARB THERAPY RXD/TAKEN: CPT | Mod: CPTII,S$GLB,, | Performed by: SURGERY

## 2024-11-06 PROCEDURE — 99999 PR PBB SHADOW E&M-EST. PATIENT-LVL III: CPT | Mod: PBBFAC,,, | Performed by: SURGERY

## 2024-11-06 RX ORDER — OXYBUTYNIN CHLORIDE 5 MG/1
5 TABLET ORAL 3 TIMES DAILY
Qty: 30 TABLET | Refills: 0 | Status: CANCELLED | OUTPATIENT
Start: 2024-10-28 | End: 2025-10-28

## 2024-11-06 NOTE — PROGRESS NOTES
Transplant Surgery  Kidney Transplant Recipient Follow-up    Referring Physician: Kamran Adorno  Current Nephrologist: Kamran Adorno    Subjective:     Chief Complaint: Chidi Lisa is a 59 y.o. year old Black or  male who is status post Kidney transplant performed on 10/17/2024.    ORGAN:   RIGHT KIDNEY  Disease Etiology: Diabetes Mellitus - Type II  Donor Type:   Donation after Brain Death  Donor CMV Status:   Positive  Donor HBcAB:   Negative  Donor HCV Status:   Negative    History of Present Illness: He reports no concerns.  From a transplant perspective, he reports normal urination.  Chidi is here for management of his immunosuppression medication.  Chidi states that his immunosuppression is being well tolerated.  Hypertension is not present.    External provider notes reviewed: No    Review of Systems   Constitutional:  Negative for fatigue.   HENT:  Negative for drooling, postnasal drip and sore throat.    Eyes:  Negative for discharge and itching.   Respiratory:  Negative for choking and stridor.    Gastrointestinal:  Negative for rectal pain.   Endocrine: Negative for polydipsia.   Genitourinary:  Negative for enuresis and genital sores.   Musculoskeletal:  Negative for back pain, neck pain and neck stiffness.   Allergic/Immunologic: Positive for immunocompromised state.   Neurological:  Negative for facial asymmetry and numbness.   Hematological:  Negative for adenopathy.   Psychiatric/Behavioral:  Negative for behavioral problems, self-injury and suicidal ideas.      Objective:     Physical Exam  Abdominal:          Comments: Well healed  Staples to be removed   Lab Results   Component Value Date    CREATININE 2.1 (H) 11/06/2024    BUN 30 (H) 11/06/2024     Lab Results   Component Value Date    WBC 5.24 11/06/2024    HGB 10.6 (L) 11/06/2024    HCT 33.6 (L) 11/06/2024    HCT 32 (L) 10/17/2024     11/06/2024     Lab Results   Component Value Date    TACROLIMUS 7.5  11/04/2024       Diagnostics:  The following labs have been reviewed: CBC  CMP    Assessment and Plan:        S/P Kidney transplant.  Chronic immunosuppressive medications for rejection prophylaxis at target.  Plan: no adjustment needed.  Continue monitoring symptoms, labs and drug levels for drug-related toxicity and side effects.  Renal hypertension at target.    Additional testing to be completed according to the Kidney: Written Order Guideline for Kidney Transplant Follow-Up (KI-09)    Interpretation of tests and discussion of patient management involves all members of the multidisciplinary transplant team.  Patient advised that it is recommended that all transplant candidates, and their close contacts and household members receive Covid vaccination.  Follow-up: Patient reminded to call with any health changes, since these can be early signs of significant complications.  Also, I advised the patient to be sure any new medications or changes of old medications are discussed with either a pharmacist, or physician knowledgeable with transplant to avoid rejection/drug toxicity related to significant drug interactions.    MD IZABELLA Bhakta Patient Status  Functional Status: 90% - Able to carry on normal activity: minor symptoms of disease  Physical Capacity: No Limitations

## 2024-11-06 NOTE — LETTER
November 6, 2024        Kamran Adorno  [address]  [address]  [place] [address]  Phone: [phone]  Fax: [phone]             [facility]- Transplant 1st Fl  [address]  [facility] [address]  Phone: [phone]   Patient: Chidi Lisa   MR Number: [id number]   YOB: 1965   Date of Visit: 11/6/2024       Dear Dr. Kamran Adorno    Thank you for referring Chidi Lisa to me for evaluation. Attached you will find relevant portions of my assessment and plan of care.    If you have questions, please do not hesitate to call me. I look forward to following Chidi Lisa along with you.    Sincerely,    Ramy Ramos MD    Enclosure    If you would like to receive this communication electronically, please contact [email or web address] or [phone] to request [employer] access.    [employer] is a tool which provides read-only access to select patient information with whom you have a relationship. Its easy to use and provides real time access to review your patients record including encounter summaries, notes, results, and demographic information.    If you feel you have received this communication in error or would no longer like to receive these types of communications, please e-mail [email or web address]

## 2024-11-07 ENCOUNTER — PATIENT MESSAGE (OUTPATIENT)
Dept: ADMINISTRATIVE | Facility: OTHER | Age: 59
End: 2024-11-07
Payer: COMMERCIAL

## 2024-11-07 DIAGNOSIS — Z94.0 STATUS POST KIDNEY TRANSPLANT: ICD-10-CM

## 2024-11-07 DIAGNOSIS — Z94.0 KIDNEY REPLACED BY TRANSPLANT: Primary | ICD-10-CM

## 2024-11-07 RX ORDER — PREDNISONE 5 MG/1
5 TABLET ORAL DAILY
Qty: 30 TABLET | Refills: 11 | Status: SHIPPED | OUTPATIENT
Start: 2024-11-07

## 2024-11-07 RX ORDER — OXYBUTYNIN CHLORIDE 5 MG/1
5 TABLET ORAL 3 TIMES DAILY
Qty: 30 TABLET | Refills: 0 | Status: CANCELLED | OUTPATIENT
Start: 2024-10-28 | End: 2025-10-28

## 2024-11-12 ENCOUNTER — PATIENT MESSAGE (OUTPATIENT)
Dept: TRANSPLANT | Facility: CLINIC | Age: 59
End: 2024-11-12
Payer: COMMERCIAL

## 2024-11-12 DIAGNOSIS — Z94.0 STATUS POST KIDNEY TRANSPLANT: ICD-10-CM

## 2024-11-12 RX ORDER — TACROLIMUS 1 MG/1
CAPSULE ORAL
Qty: 150 CAPSULE | Refills: 11 | Status: SHIPPED | OUTPATIENT
Start: 2024-11-12 | End: 2024-11-15 | Stop reason: DRUGHIGH

## 2024-11-12 RX ORDER — OXYBUTYNIN CHLORIDE 5 MG/1
5 TABLET ORAL 3 TIMES DAILY
Qty: 30 TABLET | Refills: 0 | Status: CANCELLED | OUTPATIENT
Start: 2024-10-28 | End: 2025-10-28

## 2024-11-12 NOTE — TELEPHONE ENCOUNTER
Refill Routing Note   Medication(s) are not appropriate for processing by [facility] for the following reason(s):        Non-participating provider    [facility] action(s):  Route               Appointments  past 12m or future 3m with PCP    Date Provider   Last Visit   Visit date not found Johnnie Khan MD   Next Visit   Visit date not found Johnnie Khan MD   ED visits in past 90 days: 0        Note composed:9:12 AM 11/12/2024

## 2024-11-12 NOTE — TELEPHONE ENCOUNTER
Messaged pt instructed to increase morning dose of Prograf to 3 mg but to keep the evening dose at 2 mg.  Asked pt to contact us with any questions.       ----- Message from Gianfranco Philip MD sent at 11/12/2024  8:41 AM CST -----  Please increase prograf to 3/2

## 2024-11-14 ENCOUNTER — TELEPHONE (OUTPATIENT)
Dept: TRANSPLANT | Facility: CLINIC | Age: 59
End: 2024-11-14
Payer: COMMERCIAL

## 2024-11-14 ENCOUNTER — PATIENT MESSAGE (OUTPATIENT)
Dept: TRANSPLANT | Facility: CLINIC | Age: 59
End: 2024-11-14
Payer: COMMERCIAL

## 2024-11-14 ENCOUNTER — PATIENT MESSAGE (OUTPATIENT)
Dept: ADMINISTRATIVE | Facility: OTHER | Age: 59
End: 2024-11-14
Payer: COMMERCIAL

## 2024-11-14 DIAGNOSIS — E87.6 HYPOKALEMIA: Primary | ICD-10-CM

## 2024-11-14 RX ORDER — POTASSIUM CHLORIDE 20 MEQ/1
40 TABLET, EXTENDED RELEASE ORAL DAILY
Qty: 14 TABLET | Refills: 0 | Status: SHIPPED | OUTPATIENT
Start: 2024-11-14 | End: 2024-11-21

## 2024-11-14 NOTE — TELEPHONE ENCOUNTER
----- Message from Gianfranco Philip MD sent at 11/14/2024 10:08 AM CST -----  Please advise patient to go to the ER to receive IV K+ replacement  KCL 40 mmol daily with a repeat K+ tomorrow and Monday

## 2024-11-15 DIAGNOSIS — Z94.0 STATUS POST KIDNEY TRANSPLANT: ICD-10-CM

## 2024-11-15 RX ORDER — OXYBUTYNIN CHLORIDE 5 MG/1
5 TABLET ORAL 3 TIMES DAILY
Qty: 30 TABLET | Refills: 0 | Status: CANCELLED | OUTPATIENT
Start: 2024-10-28 | End: 2025-10-28

## 2024-11-15 RX ORDER — TACROLIMUS 1 MG/1
CAPSULE ORAL
Qty: 240 CAPSULE | Refills: 11 | Status: SHIPPED | OUTPATIENT
Start: 2024-11-15 | End: 2025-11-15

## 2024-11-15 NOTE — TELEPHONE ENCOUNTER
Patient repeated back and voice a understanding of orders and will repeat labs on 11/19 as previously scheduled.  ----- Message from Gianfranco Philip MD sent at 11/15/2024 10:05 AM CST -----  Increase prograf to 4 mg PO bid

## 2024-11-18 ENCOUNTER — TELEPHONE (OUTPATIENT)
Dept: TRANSPLANT | Facility: CLINIC | Age: 59
End: 2024-11-18
Payer: COMMERCIAL

## 2024-11-18 NOTE — TELEPHONE ENCOUNTER
Followed up with patient after his ER visit last week for hypokalemia.  Patient continues to hold sodium bicarb and stopped lokelma.  He has about 3 days remaining of the potassium chloride.  Will continue to monitor labs as scheduled.

## 2024-11-19 ENCOUNTER — PATIENT MESSAGE (OUTPATIENT)
Dept: TRANSPLANT | Facility: CLINIC | Age: 59
End: 2024-11-19
Payer: COMMERCIAL

## 2024-11-20 ENCOUNTER — PATIENT MESSAGE (OUTPATIENT)
Dept: TRANSPLANT | Facility: CLINIC | Age: 59
End: 2024-11-20
Payer: COMMERCIAL

## 2024-11-20 DIAGNOSIS — Z94.0 STATUS POST KIDNEY TRANSPLANT: ICD-10-CM

## 2024-11-20 RX ORDER — TACROLIMUS 1 MG/1
3 CAPSULE ORAL EVERY 12 HOURS
Qty: 180 CAPSULE | Refills: 11 | Status: SHIPPED | OUTPATIENT
Start: 2024-11-20

## 2024-11-20 NOTE — TELEPHONE ENCOUNTER
Received written order from Dr. Philip.  Messaged pt and instructed to lower prograf to 3 mg twice a day.  Patient responded stating understanding.     ----- Message from Gianfranco Philip MD sent at 11/20/2024 10:15 AM CST -----  Please lower prograf to 3 mg PO bid. Thanks

## 2024-11-21 ENCOUNTER — PATIENT MESSAGE (OUTPATIENT)
Dept: ADMINISTRATIVE | Facility: OTHER | Age: 59
End: 2024-11-21
Payer: COMMERCIAL

## 2024-11-21 NOTE — PROGRESS NOTES
Kidney Post-Transplant Assessment    Referring Physician: Kamran Adorno  Current Nephrologist: Kamran Adorno    ORGAN: RIGHT KIDNEY  Donor Type: donation after brain death  PHS Increased Risk: no  Cold Ischemia: 653 mins  Induction Medications: thymo    Subjective:     CC:  Reassessment of renal allograft function and management of chronic immunosuppression.    HPI:  Mr. Lisa is a 59 y.o. year old Black or  male with history of ESRD secondary to DM who received a donation after brain death kidney transplant on 10/17/24 (CIT 10h 53m, CMV + +, KDPI 50%.) . His most recent creatinine is 1.8. He takes mycophenolate mofetil, prednisone, and tacrolimus for maintenance immunosuppression. His post transplant course has been uncomplicated to date.    ER 11/14 for hypokalemia with K 2.6. He was started on KCl supplementation which has now been stopped due to hyperkalemia.     He feels great today. Only complaint is softer BMs and more gas since stopping sodium bicarbonate. Heartburn has resolved with pepcid. He needs assistance extending his short term disability as they are trying to get him to go back to work before 90 days post transplant. Drinking 2+ L water daily, no problems with urination. Home -120, has not needed clonidine in quite some time. Surgical incison nearly healed, no redness or swelling. Tolerating IS without issue, no vomiting.     Current Outpatient Medications   Medication Sig Dispense Refill    apixaban (ELIQUIS) 2.5 mg Tab Take 1 tablet (2.5 mg total) by mouth 2 (two) times daily.      atorvastatin (LIPITOR) 40 MG tablet Take 40 mg by mouth every evening.       carvediloL (COREG) 12.5 MG tablet Take 1 tablet (12.5 mg total) by mouth 2 (two) times daily with meals. (Patient taking differently: Take 12.5 mg by mouth 2 (two) times daily with meals. Hold for systolic <120) 180 tablet 3    cloNIDine (CATAPRES) 0.1 MG tablet Take 1 tablet (0.1 mg total) by mouth 3 (three)  "times daily. (Patient taking differently: Take 0.1 mg by mouth 3 (three) times daily. Hold for systolic <120) 90 tablet 11    famotidine (PEPCID) 20 MG tablet Take 1 tablet (20 mg total) by mouth once daily. 30 tablet 2    insulin aspart U-100 (NOVOLOG FLEXPEN U-100 INSULIN) 100 unit/mL (3 mL) InPn pen Inject 3 Units into the skin 3 (three) times daily with meals. May also inject 0-5 Units as needed (for hyperglycemia). Add correction scale if needed.Blood sugar 180 to 230 add 1 units, Blood sugar 231 to 280 add 2 units, Blood sugar 281 to 330 add 3 units, Blood sugar 331 to 380 add 4 units, Blood sugar greater than 380 add 5 units.  Total max daily dose of 24 units. 6 mL 11    multivitamin Tab Take 1 tablet by mouth once daily.      mycophenolate (CELLCEPT) 250 mg Cap Take 4 capsules (1,000 mg total) by mouth 2 (two) times daily. 240 capsule 2    NIFEdipine (PROCARDIA-XL) 60 MG (OSM) 24 hr tablet Take 1 tablet (60 mg total) by mouth 2 (two) times a day. (Patient taking differently: Take 60 mg by mouth 2 (two) times a day. Hold for systolic <120) 60 tablet 5    pen needle, diabetic 32 gauge x 5/32" Ndle Use 1 each 3 (three) times daily. 100 each 5    pen needle, diabetic 32 gauge x 5/32" Ndle 1 each by Misc.(Non-Drug; Combo Route) route 4 (four) times daily. 200 each 11    predniSONE (DELTASONE) 5 MG tablet Take 1 tablet (5 mg total) by mouth once daily. 30 tablet 11    SITagliptin phosphate (JANUVIA) 100 MG Tab Take 1 tablet (100 mg total) by mouth once daily. 90 tablet 3    tacrolimus (PROGRAF) 1 MG Cap Take 3 capsules (3 mg total) by mouth every 12 (twelve) hours. 180 capsule 11    valGANciclovir (VALCYTE) 450 mg Tab Take 1 tablet (450 mg total) by mouth once daily. Stop 1/17/2025 30 tablet 2     No current facility-administered medications for this visit.       Past Medical History:   Diagnosis Date    Anticoagulant long-term use     Arthritis     Carotid artery disease     CKD stage 3b, GFR 30-44 ml/min " "11/04/2024    Diabetes mellitus     Dialysis patient     [facility]- L UPPER ARM AV SHUNT    Gout, unspecified     Hypertension     Immunosuppressive management encounter following kidney transplant 11/04/2024    Renal disorder     Stroke 2016    TIA    TIA (transient ischemic attack) 10/04/2019     Review of Systems   Constitutional:  Negative for activity change and fever.   Eyes:  Positive for visual disturbance.   Respiratory:  Negative for cough and shortness of breath.    Cardiovascular:  Negative for chest pain and leg swelling.   Gastrointestinal:  Negative for abdominal pain, constipation, diarrhea and nausea.   Genitourinary:  Negative for difficulty urinating, frequency and hematuria.   Musculoskeletal:  Negative for arthralgias and myalgias.   Skin:  Negative for wound.   Allergic/Immunologic: Positive for immunocompromised state.   Neurological:  Negative for weakness.   Hematological:  Bruises/bleeds easily.        On eliquis    Psychiatric/Behavioral:  Negative for sleep disturbance.        Objective:   Blood pressure 138/83, pulse 94, temperature 97.3 °F (36.3 °C), temperature source Temporal, resp. rate 16, height 5' 8" (1.727 m), weight 78.4 kg (172 lb 13.5 oz), SpO2 99%.body mass index is 26.28 kg/m².    Physical Exam  Vitals and nursing note reviewed.   Constitutional:       Appearance: Normal appearance.   Cardiovascular:      Rate and Rhythm: Normal rate and regular rhythm.      Heart sounds: Normal heart sounds.   Pulmonary:      Effort: Pulmonary effort is normal.      Breath sounds: Normal breath sounds.   Abdominal:      General: There is no distension.   Musculoskeletal:         General: Normal range of motion.   Skin:     General: Skin is warm and dry.   Neurological:      Mental Status: He is alert.         Labs:  Lab Results   Component Value Date    WBC 4.87 11/25/2024    HGB 11.7 (L) 11/25/2024    HCT 37.7 (L) 11/25/2024     11/25/2024    K 5.3 (H) " 11/25/2024     (H) 11/25/2024    CO2 19 (L) 11/25/2024    BUN 27 (H) 11/25/2024    CREATININE 1.8 (H) 11/25/2024    EGFRNORACEVR 42.8 (A) 11/25/2024    CALCIUM 9.6 11/25/2024    PHOS 2.4 (L) 11/25/2024    MG 1.5 (L) 11/25/2024    ALBUMIN 4.3 11/25/2024    AST 21 11/14/2024    ALT 16 11/14/2024    UTPCR Unable to calculate 11/19/2024    .5 (H) 10/21/2024    TACROLIMUS 12.4 11/19/2024     Labs were reviewed with the patient    Assessment:     1. Status post kidney transplant    2. Immunosuppressive management encounter following kidney transplant    3. Primary hypertension    4. Long term (current) use of anticoagulants--Eliquis    5. At risk for opportunistic infections      Plan:   Needs G6PD with next labs, off bactrim for PJP prophylaxis due to hyperkalemia  Letter written for insurance, he is going to send short term disability paperwork to be completed  Restarted sodium bicarbonate       1. Immunosuppression: Prograf trough PENDING. Continue Prograf 3/3, MMF 1000 mg BID, and Prednisone 5 mg QD. Will continue to monitor for drug toxicities    2. Allograft Function: slightly elevated, encouraged better oral hydration   - ESRD secondary to DM s/p donation after brain death kidney transplant on 10/17/24 (CIT 10h 53m, CMV + +, KDPI 50%.) .   - post transplant course has been uncomplicated to date.  Lab Results   Component Value Date    CREATININE 1.8 (H) 11/25/2024       3. Hypertension management:   - advise low salt diet and home BP monitoring    - coreg 12.5 mg BID, clonidine 0.1 mg TID, nifedipine 60 mg BID,     4. Hypophosphatemia     - improving, continue high phosphorous diet    5. Hyperkalemia   - low K diet, good hydration     6. Anemia: stable. No need for intervention   Lab Results   Component Value Date    WBC 4.87 11/25/2024    HGB 11.7 (L) 11/25/2024    HCT 37.7 (L) 11/25/2024    MCV 84 11/25/2024     11/25/2024         7. Proteinuria: continue p/c ratio as per guidelines    - [place]  [facility] [place]     8. BK virus infection screening:  BK PCR per protocol    - last PCR not detected    9. Weight education: provided during the clinic visit   Body mass index is 26.28 kg/m².     10. Patient safety education regarding immunosuppression including prophylaxis posttransplant for CMV, PCP  - PJP prophylaxis: Bactrim until 4/17/25- ON HOLD FOR HYPERKALEMIA, NEEDS G6PD next labs  - CMV prophylaxis: Valcyte until 1/17/25         Follow-up:   Clinic: return to transplant clinic weekly for the first month after transplant; every 2 weeks during months 2-3; then at 6-, 9-, 12-, 18-, 24-, and 36- months post-transplant to reassess for complications from immunosuppression toxicity and monitor for rejection.  Annually thereafter.    Labs: since patient remains at high risk for rejection and drug-related complications that warrant close monitoring, labs will be ordered as follows: continue twice weekly CBC, renal panel, and drug level for first month; then same labs once weekly through 3rd month post-transplant.  Urine for UA and protein/creatinine ratio monthly.  Serum BK - PCR at 1-, 3-, 6-, 9-, 12-, 18-, 24-, 36-, 48-, and 60 months post-transplant.  Hepatic panel at 1-, 2-, 3-, 6-, 9-, 12-, 18-, 24-, and 36- months post-transplant.    Mabel Bautista NP

## 2024-11-22 DIAGNOSIS — Z94.0 STATUS POST KIDNEY TRANSPLANT: ICD-10-CM

## 2024-11-22 DIAGNOSIS — R12 HEARTBURN: Primary | ICD-10-CM

## 2024-11-22 RX ORDER — FAMOTIDINE 20 MG/1
20 TABLET, FILM COATED ORAL DAILY
Qty: 30 TABLET | Refills: 2 | Status: SHIPPED | OUTPATIENT
Start: 2024-11-22

## 2024-11-22 NOTE — TELEPHONE ENCOUNTER
Patient requesting refills on Pepcid.  Nausea and heartburn started after he ran out of Pepcid on Monday.  Erx sent to Dr. Philip for review and approval.

## 2024-11-25 ENCOUNTER — TELEPHONE (OUTPATIENT)
Dept: TRANSPLANT | Facility: CLINIC | Age: 59
End: 2024-11-25

## 2024-11-25 ENCOUNTER — LAB VISIT (OUTPATIENT)
Dept: LAB | Facility: HOSPITAL | Age: 59
End: 2024-11-25
Payer: COMMERCIAL

## 2024-11-25 ENCOUNTER — PATIENT MESSAGE (OUTPATIENT)
Dept: TRANSPLANT | Facility: CLINIC | Age: 59
End: 2024-11-25

## 2024-11-25 ENCOUNTER — OFFICE VISIT (OUTPATIENT)
Dept: TRANSPLANT | Facility: CLINIC | Age: 59
End: 2024-11-25
Payer: COMMERCIAL

## 2024-11-25 VITALS
DIASTOLIC BLOOD PRESSURE: 83 MMHG | HEIGHT: 68 IN | SYSTOLIC BLOOD PRESSURE: 138 MMHG | OXYGEN SATURATION: 99 % | BODY MASS INDEX: 26.19 KG/M2 | TEMPERATURE: 97 F | HEART RATE: 94 BPM | RESPIRATION RATE: 16 BRPM | WEIGHT: 172.81 LBS

## 2024-11-25 DIAGNOSIS — Z91.89 AT RISK FOR OPPORTUNISTIC INFECTIONS: ICD-10-CM

## 2024-11-25 DIAGNOSIS — Z79.899 IMMUNOSUPPRESSIVE MANAGEMENT ENCOUNTER FOLLOWING KIDNEY TRANSPLANT: ICD-10-CM

## 2024-11-25 DIAGNOSIS — Z94.0 KIDNEY REPLACED BY TRANSPLANT: Primary | ICD-10-CM

## 2024-11-25 DIAGNOSIS — Z94.0 STATUS POST KIDNEY TRANSPLANT: Primary | ICD-10-CM

## 2024-11-25 DIAGNOSIS — Z94.0 KIDNEY REPLACED BY TRANSPLANT: ICD-10-CM

## 2024-11-25 DIAGNOSIS — Z79.01 LONG TERM (CURRENT) USE OF ANTICOAGULANTS: ICD-10-CM

## 2024-11-25 DIAGNOSIS — Z94.0 IMMUNOSUPPRESSIVE MANAGEMENT ENCOUNTER FOLLOWING KIDNEY TRANSPLANT: ICD-10-CM

## 2024-11-25 DIAGNOSIS — I10 PRIMARY HYPERTENSION: ICD-10-CM

## 2024-11-25 LAB
ALBUMIN SERPL BCP-MCNC: 4.3 G/DL (ref 3.5–5.2)
ANION GAP SERPL CALC-SCNC: 6 MMOL/L (ref 8–16)
BASOPHILS # BLD AUTO: 0.02 K/UL (ref 0–0.2)
BASOPHILS NFR BLD: 0.4 % (ref 0–1.9)
BUN SERPL-MCNC: 27 MG/DL (ref 6–20)
CALCIUM SERPL-MCNC: 9.6 MG/DL (ref 8.7–10.5)
CHLORIDE SERPL-SCNC: 111 MMOL/L (ref 95–110)
CO2 SERPL-SCNC: 19 MMOL/L (ref 23–29)
CREAT SERPL-MCNC: 1.8 MG/DL (ref 0.5–1.4)
DIFFERENTIAL METHOD BLD: ABNORMAL
EOSINOPHIL # BLD AUTO: 0 K/UL (ref 0–0.5)
EOSINOPHIL NFR BLD: 0.8 % (ref 0–8)
ERYTHROCYTE [DISTWIDTH] IN BLOOD BY AUTOMATED COUNT: 16.4 % (ref 11.5–14.5)
EST. GFR  (NO RACE VARIABLE): 42.8 ML/MIN/1.73 M^2
GLUCOSE SERPL-MCNC: 142 MG/DL (ref 70–110)
HCT VFR BLD AUTO: 37.7 % (ref 40–54)
HGB BLD-MCNC: 11.7 G/DL (ref 14–18)
IMM GRANULOCYTES # BLD AUTO: 0.04 K/UL (ref 0–0.04)
IMM GRANULOCYTES NFR BLD AUTO: 0.8 % (ref 0–0.5)
LYMPHOCYTES # BLD AUTO: 0.5 K/UL (ref 1–4.8)
LYMPHOCYTES NFR BLD: 10.9 % (ref 18–48)
MAGNESIUM SERPL-MCNC: 1.5 MG/DL (ref 1.6–2.6)
MCH RBC QN AUTO: 26.1 PG (ref 27–31)
MCHC RBC AUTO-ENTMCNC: 31 G/DL (ref 32–36)
MCV RBC AUTO: 84 FL (ref 82–98)
MONOCYTES # BLD AUTO: 0.4 K/UL (ref 0.3–1)
MONOCYTES NFR BLD: 8 % (ref 4–15)
NEUTROPHILS # BLD AUTO: 3.9 K/UL (ref 1.8–7.7)
NEUTROPHILS NFR BLD: 79.1 % (ref 38–73)
NRBC BLD-RTO: 0 /100 WBC
PHOSPHATE SERPL-MCNC: 2.4 MG/DL (ref 2.7–4.5)
PLATELET # BLD AUTO: 226 K/UL (ref 150–450)
PMV BLD AUTO: 9.5 FL (ref 9.2–12.9)
POTASSIUM SERPL-SCNC: 5.3 MMOL/L (ref 3.5–5.1)
RBC # BLD AUTO: 4.49 M/UL (ref 4.6–6.2)
SODIUM SERPL-SCNC: 136 MMOL/L (ref 136–145)
TACROLIMUS BLD-MCNC: 8.6 NG/ML (ref 5–15)
WBC # BLD AUTO: 4.87 K/UL (ref 3.9–12.7)

## 2024-11-25 PROCEDURE — 83735 ASSAY OF MAGNESIUM: CPT | Performed by: INTERNAL MEDICINE

## 2024-11-25 PROCEDURE — 80069 RENAL FUNCTION PANEL: CPT | Performed by: INTERNAL MEDICINE

## 2024-11-25 PROCEDURE — 80197 ASSAY OF TACROLIMUS: CPT | Performed by: INTERNAL MEDICINE

## 2024-11-25 PROCEDURE — 99999 PR PBB SHADOW E&M-EST. PATIENT-LVL IV: CPT | Mod: PBBFAC,,, | Performed by: NURSE PRACTITIONER

## 2024-11-25 PROCEDURE — 85025 COMPLETE CBC W/AUTO DIFF WBC: CPT | Performed by: INTERNAL MEDICINE

## 2024-11-25 PROCEDURE — 36415 COLL VENOUS BLD VENIPUNCTURE: CPT | Performed by: INTERNAL MEDICINE

## 2024-11-25 RX ORDER — SODIUM BICARBONATE 650 MG/1
650 TABLET ORAL 2 TIMES DAILY
Qty: 60 TABLET | Refills: 11 | Status: SHIPPED | OUTPATIENT
Start: 2024-11-25 | End: 2024-11-25

## 2024-11-25 RX ORDER — SODIUM BICARBONATE 650 MG/1
1300 TABLET ORAL 3 TIMES DAILY
Qty: 180 TABLET | Refills: 11 | Status: SHIPPED | OUTPATIENT
Start: 2024-11-25 | End: 2025-11-25

## 2024-11-25 NOTE — LETTER
November 25, 2024      [facility]st [facility]  [address]  [facility] [address]  Phone: [phone]       Patient: Chidi Lisa   YOB: 1965  Date of Visit: 11/25/2024    To Whom It May Concern:    Babatunde Lisa  was at [facility] on 11/25/2024. He underwent life saving kidney transplant on 10/17/2024 and continues with frequent lab work and follow up visits. Due to his recent surgery and immunocompromised status it is advised to wait at least 90 days post transplant to return to work. If you have any questions or concerns, or if I can be of further assistance, please do not hesitate to contact me.    Sincerely,    Mabel Bautista, NP

## 2024-11-25 NOTE — TELEPHONE ENCOUNTER
----- Message from Gianfranco Philip MD sent at 11/25/2024  9:23 AM CST -----  Let's get allosure and DSA tomorrow. Encourage more water hydration   Repeat labs this week  Please make sure he is OFF Potassium supplements  Start sodium bicarbonate 1300 tid  Low K diet  Let me know if he has any acute issue or not feeling well

## 2024-11-25 NOTE — PROGRESS NOTES
Let's get allosure and DSA tomorrow. Encourage more water hydration   Repeat labs this week  Please make sure he is OFF Potassium supplements  Start sodium bicarbonate 1300 tid  Low K diet  Let me know if he has any acute issue or not feeling well

## 2024-11-25 NOTE — LETTER
November 25, 2024        Kamran Adorno  [address]  [address]  [place] [address]  Phone: [phone]  Fax: [phone]             [facility] 1st [facility]  [address]  [facility] [address]  Phone: [phone]   Patient: Chidi Lisa   MR Number: [id number]   YOB: 1965   Date of Visit: 11/25/2024       Dear Dr. Kamran Adorno    Thank you for referring Chidi Lisa to me for evaluation. Attached you will find relevant portions of my assessment and plan of care.    If you have questions, please do not hesitate to call me. I look forward to following Chidi Lisa along with you.    Sincerely,    Mabel Bautista NP    Enclosure    If you would like to receive this communication electronically, please contact [email or web address] or [phone] to request [facility] [employer] access.    [employer] [employer] is a tool which provides read-only access to select patient information with whom you have a relationship. Its easy to use and provides real time access to review your patients record including encounter summaries, notes, results, and demographic information.    If you feel you have received this communication in error or would no longer like to receive these types of communications, please e-mail [email or web address]

## 2024-11-27 ENCOUNTER — LAB VISIT (OUTPATIENT)
Dept: LAB | Facility: HOSPITAL | Age: 59
End: 2024-11-27
Payer: COMMERCIAL

## 2024-11-27 DIAGNOSIS — Z94.0 KIDNEY REPLACED BY TRANSPLANT: ICD-10-CM

## 2024-11-27 LAB
ALBUMIN SERPL BCP-MCNC: 4.1 G/DL (ref 3.5–5.2)
ANION GAP SERPL CALC-SCNC: 8 MMOL/L (ref 8–16)
BUN SERPL-MCNC: 23 MG/DL (ref 6–20)
CALCIUM SERPL-MCNC: 9.4 MG/DL (ref 8.7–10.5)
CHLORIDE SERPL-SCNC: 111 MMOL/L (ref 95–110)
CO2 SERPL-SCNC: 21 MMOL/L (ref 23–29)
CREAT SERPL-MCNC: 1.6 MG/DL (ref 0.5–1.4)
EST. GFR  (NO RACE VARIABLE): 49.3 ML/MIN/1.73 M^2
GLUCOSE SERPL-MCNC: 147 MG/DL (ref 70–110)
PHOSPHATE SERPL-MCNC: 2.3 MG/DL (ref 2.7–4.5)
POTASSIUM SERPL-SCNC: 4.9 MMOL/L (ref 3.5–5.1)
SODIUM SERPL-SCNC: 140 MMOL/L (ref 136–145)

## 2024-11-27 PROCEDURE — 36415 COLL VENOUS BLD VENIPUNCTURE: CPT | Performed by: INTERNAL MEDICINE

## 2024-11-27 PROCEDURE — 86833 HLA CLASS II HIGH DEFIN QUAL: CPT | Performed by: INTERNAL MEDICINE

## 2024-11-27 PROCEDURE — 80069 RENAL FUNCTION PANEL: CPT | Performed by: INTERNAL MEDICINE

## 2024-11-27 PROCEDURE — 86977 RBC SERUM PRETX INCUBJ/INHIB: CPT | Performed by: INTERNAL MEDICINE

## 2024-11-27 PROCEDURE — 86832 HLA CLASS I HIGH DEFIN QUAL: CPT | Performed by: INTERNAL MEDICINE

## 2024-12-01 LAB
ALLOSURE COMMENT: NORMAL
ALLOSURE SCORE KIDNEY: 0.14 %
INFORMATION: NORMAL

## 2024-12-02 ENCOUNTER — PATIENT MESSAGE (OUTPATIENT)
Dept: TRANSPLANT | Facility: CLINIC | Age: 59
End: 2024-12-02
Payer: COMMERCIAL

## 2024-12-02 LAB
CLASS I ANTIBODY COMMENTS - LUMINEX: NORMAL
CLASS II ANTIBODY COMMENTS - LUMINEX: NORMAL
DSA1 TESTING DATE: NORMAL
DSA12 TESTING DATE: NORMAL
DSA2 TESTING DATE: NORMAL
SERUM COLLECTION DT - LUMINEX CLASS I: NORMAL
SERUM COLLECTION DT - LUMINEX CLASS II: NORMAL

## 2024-12-06 ENCOUNTER — PATIENT MESSAGE (OUTPATIENT)
Dept: TRANSPLANT | Facility: CLINIC | Age: 59
End: 2024-12-06
Payer: COMMERCIAL

## 2024-12-06 DIAGNOSIS — Z94.0 KIDNEY REPLACED BY TRANSPLANT: ICD-10-CM

## 2024-12-06 DIAGNOSIS — R19.7 DIARRHEA, UNSPECIFIED TYPE: Primary | ICD-10-CM

## 2024-12-06 RX ORDER — MYCOPHENOLIC ACID 180 MG/1
720 TABLET, DELAYED RELEASE ORAL 2 TIMES DAILY
Qty: 240 TABLET | Refills: 11 | Status: SHIPPED | OUTPATIENT
Start: 2024-12-06

## 2024-12-06 NOTE — TELEPHONE ENCOUNTER
----- Message from Gianfranco Philip MD sent at 12/6/2024 10:47 AM CST -----  Regarding: RE: diarrhea  Lets switch to myfortic 720 bid  Lets get stool sample for GI infection pcr  ----- Message -----  From: Graciela Horne RN  Sent: 12/6/2024   9:17 AM CST  To: Gianfranco Philip MD  Subject: diarrhea                                         Pt has had intermittent diarrhea for a few weeks now.  He noticed a trend with the onset of diarrhea with his novolog.  He's made adjustments recommended by his endocrinologist.  Diarrhea is better but still present.  Do you have any recommendations?  Lyn

## 2024-12-10 ENCOUNTER — PATIENT MESSAGE (OUTPATIENT)
Dept: TRANSPLANT | Facility: CLINIC | Age: 59
End: 2024-12-10
Payer: COMMERCIAL

## 2024-12-10 DIAGNOSIS — Z94.0 STATUS POST KIDNEY TRANSPLANT: ICD-10-CM

## 2024-12-10 RX ORDER — TACROLIMUS 1 MG/1
4 CAPSULE ORAL EVERY 12 HOURS
Qty: 240 CAPSULE | Refills: 11 | Status: SHIPPED | OUTPATIENT
Start: 2024-12-10

## 2024-12-10 NOTE — TELEPHONE ENCOUNTER
Messaged pt and instructed to increase prograf to 4 mg twice a day and repeat labs next week as scheduled.  Asked pt to contact us with any questions.     ----- Message from Gianfranco Philip MD sent at 12/10/2024 10:07 AM CST -----  Please increase prograf to 4 mg PO bid, please repeat labs next week

## 2024-12-17 ENCOUNTER — PATIENT MESSAGE (OUTPATIENT)
Dept: TRANSPLANT | Facility: CLINIC | Age: 59
End: 2024-12-17
Payer: COMMERCIAL

## 2024-12-17 DIAGNOSIS — Z94.0 STATUS POST KIDNEY TRANSPLANT: ICD-10-CM

## 2024-12-17 RX ORDER — TACROLIMUS 1 MG/1
CAPSULE ORAL
Qty: 210 CAPSULE | Refills: 11 | Status: SHIPPED | OUTPATIENT
Start: 2024-12-17

## 2024-12-17 NOTE — TELEPHONE ENCOUNTER
Received written order from Dr. Philip.  Messaged pt and instructed to lower evening dose of Prograf to 3 mg but keep the morning dose at 4 mg.  Labs to be repeated Monday as scheduled.       ----- Message from Gianfranco Philip MD sent at 12/17/2024  9:37 AM CST -----  Lets lower prograf to 4/3 and please repeat a level next week

## 2024-12-18 NOTE — PROGRESS NOTES
Kidney Post-Transplant Assessment    Referring Physician: Kamran dAorno  Current Nephrologist: Kamran Adorno    ORGAN: RIGHT KIDNEY  Donor Type: donation after brain death  PHS Increased Risk: no  Cold Ischemia: 653 mins  Induction Medications: thymo    Subjective:     CC:  Reassessment of renal allograft function and management of chronic immunosuppression.    HPI:  Mr. Lisa is a 59 y.o. year old Black or  male with history of ESRD secondary to DM who received a donation after brain death kidney transplant on 10/17/24 (CIT 10h 53m, CMV + +, KDPI 50%.) . His most recent creatinine is 1.6. He takes mycophenolate mofetil, prednisone, and tacrolimus for maintenance immunosuppression. His post transplant course has been uncomplicated to date.      He feels great today. Drinking 4+ L water daily, no problems with urination. Home -120, has not needed clonidine in quite some time. Surgical incison nearly healed, no redness or swelling. Tolerating IS without issue, no vomiting.     Current Outpatient Medications   Medication Sig Dispense Refill    apixaban (ELIQUIS) 2.5 mg Tab Take 1 tablet (2.5 mg total) by mouth 2 (two) times daily.      atorvastatin (LIPITOR) 40 MG tablet Take 40 mg by mouth every evening.       carvediloL (COREG) 12.5 MG tablet Take 1 tablet (12.5 mg total) by mouth 2 (two) times daily with meals. 180 tablet 3    cloNIDine (CATAPRES) 0.1 MG tablet Take 1 tablet (0.1 mg total) by mouth 3 (three) times daily. 90 tablet 11    insulin aspart U-100 (NOVOLOG FLEXPEN U-100 INSULIN) 100 unit/mL (3 mL) InPn pen Inject 3 Units into the skin 3 (three) times daily with meals. May also inject 0-5 Units as needed for hyperglycemia. Add correction scale if needed. Blood sugar 180-230 add 1 units, 231-280 add 2 units, 281-330 add 3 units, 331-380 add 4 units, <380 add 5 units. TTD 24u 6 mL 11    multivitamin Tab Take 1 tablet by mouth once daily.      mycophenolate sodium (MYFORTIC)  "180 MG TbEC Take 4 tablets (720 mg total) by mouth 2 (two) times daily. 240 tablet 11    NIFEdipine (PROCARDIA-XL) 60 MG (OSM) 24 hr tablet Take 1 tablet (60 mg total) by mouth 2 (two) times a day. 60 tablet 5    pen needle, diabetic 32 gauge x 5/32" Ndle Use 1 each 3 (three) times daily. 100 each 5    predniSONE (DELTASONE) 5 MG tablet Take 1 tablet (5 mg total) by mouth once daily. 30 tablet 11    SITagliptin phosphate (JANUVIA) 100 MG Tab Take 1 tablet (100 mg total) by mouth once daily. 90 tablet 3    sodium bicarbonate 650 MG tablet Take 2 tablets (1,300 mg total) by mouth 3 (three) times daily. 180 tablet 11    tacrolimus (PROGRAF) 1 MG Cap Take 4 capsules (4 mg total) by mouth every morning AND 3 capsules (3 mg total) every evening. 210 capsule 11    valGANciclovir (VALCYTE) 450 mg Tab Take 1 tablet (450 mg total) by mouth once daily. Stop 1/17/2025 30 tablet 2    famotidine (PEPCID) 20 MG tablet Take 1 tablet (20 mg total) by mouth once daily. 30 tablet 2    pen needle, diabetic 32 gauge x 5/32" Ndle 1 each by Misc.(Non-Drug; Combo Route) route 4 (four) times daily. 200 each 11     No current facility-administered medications for this visit.       Past Medical History:   Diagnosis Date    Anticoagulant long-term use     Arthritis     Carotid artery disease     CKD stage 3b, GFR 30-44 ml/min 11/04/2024    Diabetes mellitus     Dialysis patient     [facility]- L UPPER ARM AV SHUNT    Gout, unspecified     Hypertension     Immunosuppressive management encounter following kidney transplant 11/04/2024    Renal disorder     Stroke 2016    TIA    TIA (transient ischemic attack) 10/04/2019     Review of Systems   Constitutional:  Negative for activity change and fever.   Eyes:  Positive for visual disturbance.   Respiratory:  Negative for cough and shortness of breath.    Cardiovascular:  Negative for chest pain and leg swelling.   Gastrointestinal:  Negative for abdominal pain, " "constipation, diarrhea and nausea.   Genitourinary:  Negative for difficulty urinating, frequency and hematuria.   Musculoskeletal:  Negative for arthralgias and myalgias.   Skin:  Negative for wound.   Allergic/Immunologic: Positive for immunocompromised state.   Neurological:  Negative for weakness.   Hematological:  Bruises/bleeds easily.        On eliquis    Psychiatric/Behavioral:  Negative for sleep disturbance.        Objective:   Blood pressure 137/64, pulse 66, temperature 97.3 °F (36.3 °C), temperature source Tympanic, resp. rate 18, height 5' 7" (1.702 m), weight 78.2 kg (172 lb 6.4 oz), SpO2 98%.body mass index is unknown because there is no height or weight on file.    Physical Exam  Vitals and nursing note reviewed.   Constitutional:       Appearance: Normal appearance.   Cardiovascular:      Rate and Rhythm: Normal rate and regular rhythm.      Heart sounds: Normal heart sounds.   Pulmonary:      Effort: Pulmonary effort is normal.      Breath sounds: Normal breath sounds.   Abdominal:      General: There is no distension.   Musculoskeletal:         General: Normal range of motion.   Skin:     General: Skin is warm and dry.   Neurological:      Mental Status: He is alert.     Healed incision on right side    Labs:  Lab Results   Component Value Date    WBC 3.08 (L) 2024    HGB 11.7 (L) 2024    HCT 38.1 (L) 2024     2024    K 4.7 2024     (H) 2024    CO2 22 (L) 2024    BUN 19 2024    CREATININE 1.6 (H) 2024    EGFRNORACEVR 49.3 (A) 2024    CALCIUM 9.5 2024    PHOS 2.9 2024    MG 1.5 (L) 2024    ALBUMIN 4.5 2024    AST 21 2024    ALT 16 2024    UTPCR Unable to calculate 2024    .5 (H) 10/21/2024    TACROLIMUS 10.6 2024     Labs were reviewed with the patient    Assessment:     1. Status post -donor kidney transplantation    2. CKD stage 3b, GFR 30-44 ml/min    3. Benign " hypertension with ESRD (end-stage renal disease)    4. Long term (current) use of anticoagulants--Eliquis      Plan:   Needs G6PD with next labs, off bactrim for PJP prophylaxis due to hyperkalemia while on bactrim  Overhydrating--- drinking 4.5-4.8L a day. Awake all night urinating. Encouraged to reduce to about 3.5L a day and will monitor labs.      1. Immunosuppression: Prograf trough 10.6. Continue decrease Prograf 4/3,  mg BID, and Prednisone 5 mg QD. Will continue to monitor for drug toxicities    2. Allograft Function: slightly elevated, encouraged better oral hydration   - ESRD secondary to DM s/p donation after brain death kidney transplant on 10/17/24 (CIT 10h 53m, CMV + +, KDPI 50%.) .   - post transplant course has been uncomplicated to date.  Lab Results   Component Value Date    CREATININE 1.6 (H) 12/23/2024       3. Hypertension management:   - advise low salt diet and home BP monitoring    - coreg 12.5 mg BID, clonidine 0.1 mg TID, nifedipine 60 mg BID,     4. Hypophosphatemia     - improving, continue high phosphorous diet    5. Hyperkalemia   - low K diet, good hydration     6. Anemia: stable. No need for intervention   Lab Results   Component Value Date    WBC 3.08 (L) 12/23/2024    HGB 11.7 (L) 12/23/2024    HCT 38.1 (L) 12/23/2024    MCV 85 12/23/2024     12/23/2024       7. Proteinuria: continue p/c ratio as per guidelines    - last [facility] unable to calculate     8. BK virus infection screening:  BK PCR per protocol    - last PCR not detected    9. Weight education: provided during the clinic visit   There is no height or weight on file to calculate BMI.     10. Patient safety education regarding immunosuppression including prophylaxis posttransplant for CMV, PCP  - PJP prophylaxis: Bactrim until 4/17/25- ON HOLD FOR HYPERKALEMIA, NEEDS G6PD next labs  - CMV prophylaxis: Valcyte until 1/17/25         Follow-up:   Clinic: return to transplant clinic weekly for the first month after  transplant; every 2 weeks during months 2-3; then at 6-, 9-, 12-, 18-, 24-, and 36- months post-transplant to reassess for complications from immunosuppression toxicity and monitor for rejection.  Annually thereafter.    Labs: since patient remains at high risk for rejection and drug-related complications that warrant close monitoring, labs will be ordered as follows: continue twice weekly CBC, renal panel, and drug level for first month; then same labs once weekly through 3rd month post-transplant.  Urine for UA and protein/creatinine ratio monthly.  Serum BK - PCR at 1-, 3-, 6-, 9-, 12-, 18-, 24-, 36-, 48-, and 60 months post-transplant.  Hepatic panel at 1-, 2-, 3-, 6-, 9-, 12-, 18-, 24-, and 36- months post-transplant.    Rose Ca, NP

## 2024-12-23 ENCOUNTER — OFFICE VISIT (OUTPATIENT)
Dept: TRANSPLANT | Facility: CLINIC | Age: 59
End: 2024-12-23
Payer: COMMERCIAL

## 2024-12-23 VITALS
BODY MASS INDEX: 27.06 KG/M2 | WEIGHT: 172.38 LBS | RESPIRATION RATE: 18 BRPM | HEART RATE: 66 BPM | SYSTOLIC BLOOD PRESSURE: 137 MMHG | OXYGEN SATURATION: 98 % | DIASTOLIC BLOOD PRESSURE: 64 MMHG | HEIGHT: 67 IN | TEMPERATURE: 97 F

## 2024-12-23 DIAGNOSIS — I12.0 BENIGN HYPERTENSION WITH ESRD (END-STAGE RENAL DISEASE): ICD-10-CM

## 2024-12-23 DIAGNOSIS — N18.32 CKD STAGE 3B, GFR 30-44 ML/MIN: ICD-10-CM

## 2024-12-23 DIAGNOSIS — Z79.01 LONG TERM (CURRENT) USE OF ANTICOAGULANTS: Chronic | ICD-10-CM

## 2024-12-23 DIAGNOSIS — Z94.0 STATUS POST DECEASED-DONOR KIDNEY TRANSPLANTATION: Primary | ICD-10-CM

## 2024-12-23 DIAGNOSIS — N18.6 BENIGN HYPERTENSION WITH ESRD (END-STAGE RENAL DISEASE): ICD-10-CM

## 2024-12-23 PROCEDURE — 99999 PR PBB SHADOW E&M-EST. PATIENT-LVL IV: CPT | Mod: PBBFAC,,, | Performed by: NURSE PRACTITIONER

## 2024-12-23 NOTE — LETTER
December 23, 2024        Kamran Adorno  [address] [facility]  [address]  [place] [address]  Phone: [phone]  Fax: [phone]             [facility]- Transplant 1st Fl  [address]  [facility] [address]  Phone: [phone]   Patient: Chidi Lisa   MR Number: [id number]   YOB: 1965   Date of Visit: 12/23/2024       Dear Dr. Kamran Adorno    Thank you for referring Chidi Lisa to me for evaluation. Attached you will find relevant portions of my assessment and plan of care.    If you have questions, please do not hesitate to call me. I look forward to following Chidi Lisa along with you.    Sincerely,    Rose Ca, NP    Enclosure    If you would like to receive this communication electronically, please contact [email or web address] or [phone] to request [employer] Link access.    [employer] Link is a tool which provides read-only access to select patient information with whom you have a relationship. Its easy to use and provides real time access to review your patients record including encounter summaries, notes, results, and demographic information.    If you feel you have received this communication in error or would no longer like to receive these types of communications, please e-mail [email or web address]

## 2024-12-24 ENCOUNTER — PATIENT MESSAGE (OUTPATIENT)
Dept: TRANSPLANT | Facility: CLINIC | Age: 59
End: 2024-12-24
Payer: COMMERCIAL

## 2024-12-24 DIAGNOSIS — Z94.0 STATUS POST KIDNEY TRANSPLANT: ICD-10-CM

## 2024-12-24 RX ORDER — TACROLIMUS 1 MG/1
3 CAPSULE ORAL EVERY 12 HOURS
Qty: 180 CAPSULE | Refills: 11 | Status: SHIPPED | OUTPATIENT
Start: 2024-12-24

## 2024-12-24 NOTE — TELEPHONE ENCOUNTER
Received written order from Dr. Morataya.  Messaged pt and instructed to lower tacro to 3 mg bid.  Asked pt to contact us with any questions.     ----- Message from Tata Atkinson MD sent at 12/24/2024 10:16 AM CST -----  Lower tacro tback to 3 mg bid, and ensure these are 12 hr troughs

## 2025-01-13 ENCOUNTER — PATIENT MESSAGE (OUTPATIENT)
Dept: TRANSPLANT | Facility: CLINIC | Age: 60
End: 2025-01-13
Payer: COMMERCIAL

## 2025-01-14 ENCOUNTER — PATIENT MESSAGE (OUTPATIENT)
Dept: TRANSPLANT | Facility: CLINIC | Age: 60
End: 2025-01-14
Payer: COMMERCIAL

## 2025-01-14 PROBLEM — D63.1 ANEMIA IN STAGE 3A CHRONIC KIDNEY DISEASE: Status: ACTIVE | Noted: 2024-10-15

## 2025-01-14 PROBLEM — N18.31 ANEMIA IN STAGE 3A CHRONIC KIDNEY DISEASE: Status: ACTIVE | Noted: 2024-10-15

## 2025-01-14 PROBLEM — N18.6 ESRD ON PERITONEAL DIALYSIS: Status: RESOLVED | Noted: 2019-10-04 | Resolved: 2025-01-14

## 2025-01-14 PROBLEM — N18.31 STAGE 3A CHRONIC KIDNEY DISEASE: Status: ACTIVE | Noted: 2024-11-04

## 2025-01-14 PROBLEM — I12.0 BENIGN HYPERTENSION WITH ESRD (END-STAGE RENAL DISEASE): Status: RESOLVED | Noted: 2019-10-04 | Resolved: 2025-01-14

## 2025-01-14 PROBLEM — N18.6 BENIGN HYPERTENSION WITH ESRD (END-STAGE RENAL DISEASE): Status: RESOLVED | Noted: 2019-10-04 | Resolved: 2025-01-14

## 2025-01-14 PROBLEM — Z76.82 PATIENT ON WAITING LIST FOR KIDNEY TRANSPLANT: Chronic | Status: RESOLVED | Noted: 2020-10-27 | Resolved: 2025-01-14

## 2025-01-14 PROBLEM — Z99.2 ESRD ON PERITONEAL DIALYSIS: Status: RESOLVED | Noted: 2019-10-04 | Resolved: 2025-01-14

## 2025-01-14 NOTE — PROGRESS NOTES
Kidney Post-Transplant Assessment    Referring Physician: Kamran Adorno  Current Nephrologist: Kamran Adorno    ORGAN: RIGHT KIDNEY  Donor Type: donation after brain death  PHS Increased Risk: no  Cold Ischemia: 653 mins  Induction Medications: thymo    Subjective:     CC:  Reassessment of renal allograft function and management of chronic immunosuppression.    HPI:  Mr. Lisa is a 59 y.o. year old Black or  male with history of ESRD secondary to DM who received a donation after brain death kidney transplant on 10/17/24 (CIT 10h 53m, CMV + +, KDPI 50%.) . His most recent creatinine is 1.4. He takes mycophenolate mofetil, prednisone, and tacrolimus for maintenance immunosuppression. His post transplant course has been uncomplicated to date.    LOV 12/23/24  Interval HX:  fx assessment -over all doing well, plans to go back to work next week  Log book reviewed   Weight stable ~ 170 lbs   Intake ~ 60-80 ounces   UOP ~ 3 L   Peripheral edema-no   Appetite-denies N/V/D; tolerating IS meds well     BP ~ 120/70 at home   BP Readings from Last 3 Encounters:   01/15/25 (!) 151/69   12/23/24 137/64   11/25/24 138/83       Lab /diagnostic results reviewed with patient today.   All questions answered       Past Medical History:   Diagnosis Date    Anticoagulant long-term use     Arthritis     Carotid artery disease     CKD stage 3b, GFR 30-44 ml/min 11/04/2024    Diabetes mellitus     Dialysis patient     [facility]- L UPPER ARM AV SHUNT    Gout, unspecified     Hypertension     Immunosuppressive management encounter following kidney transplant 11/04/2024    Renal disorder     Stroke 2016    TIA    TIA (transient ischemic attack) 10/04/2019     Review of Systems   Constitutional:  Negative for activity change and fever.   Eyes:  Positive for visual disturbance.   Respiratory:  Negative for cough and shortness of breath.    Cardiovascular:  Negative for chest pain and leg  "swelling.   Gastrointestinal:  Negative for abdominal pain, constipation, diarrhea and nausea.   Genitourinary:  Negative for difficulty urinating, frequency and hematuria.   Musculoskeletal:  Negative for arthralgias and myalgias.   Skin:  Negative for wound.   Allergic/Immunologic: Positive for immunocompromised state.   Neurological:  Negative for weakness.   Hematological:  Bruises/bleeds easily.        On eliquis    Psychiatric/Behavioral:  Negative for sleep disturbance.        Objective:   Blood pressure (!) 151/69, pulse 71, temperature 97.7 °F (36.5 °C), temperature source Tympanic, resp. rate 18, height 5' 7" (1.702 m), weight 78.3 kg (172 lb 9.9 oz), SpO2 99%.body mass index is 27.04 kg/m².    Physical Exam  Vitals and nursing note reviewed.   Constitutional:       Appearance: Normal appearance.   Cardiovascular:      Rate and Rhythm: Normal rate and regular rhythm.      Heart sounds: Normal heart sounds.   Pulmonary:      Effort: Pulmonary effort is normal.      Breath sounds: Normal breath sounds.   Abdominal:      General: There is no distension.   Musculoskeletal:         General: Normal range of motion.   Skin:     General: Skin is warm and dry.   Neurological:      Mental Status: He is alert.          Labs:  Lab Results   Component Value Date    WBC 2.64 (L) 2025    HGB 11.2 (L) 2025    HCT 36.9 (L) 2025     2025    K 4.2 2025     2025    CO2 23 2025    BUN 16 2025    CREATININE 1.4 2025    EGFRNORACEVR 57.9 (A) 2025    CALCIUM 8.9 2025    PHOS 2.7 2025    MG 1.5 (L) 2025    ALBUMIN 4.1 2025    ALBUMIN 4.1 2025    AST 23 2025    ALT 17 2025    UTPCR Unable to calculate 2025    .5 (H) 10/21/2024    TACROLIMUS 7.2 2025     Labs were reviewed with the patient    Assessment:     1. Status post -donor kidney transplantation    2. Long-term use of immunosuppressant " medication    3. Renovascular hypertension    4. Stage 3a chronic kidney disease    5. Anemia in stage 3a chronic kidney disease    6. Controlled type 2 diabetes mellitus with stage 3 chronic kidney disease, without long-term current use of insulin        Plan:    off bactrim for PJP prophylaxis due to hyperkalemia    --K+ remains stable but  has leukopenia w/ WBC 2.64 and ANC 1900   - G6PD today -Plan--start  dapsone for PCP prophylaxis  if acceptable    Stop Valcyte 1/17/25    Leukopenia: slightly improved, repeat labs 1 week   Plans to go back to work next week       1. Immunosuppression: Prograf trough  7.2   Cont as prescribed:   -Prograf 3/3  - mg BID    -Prednisone 5 mg QD.   - PCP prophylaxis-->4/17/25  -Will continue to monitor for drug toxicities      2. Allograft Function:  stable, CKD 3a  - ESRD secondary to DM s/p donation after brain death kidney transplant on 10/17/24 (CIT 10h 53m, CMV + +, KDPI 50%.) .   - post transplant course has been uncomplicated to date.  11/27/24 Allosure: 0.14%  11/27/24 DSAs   Class I Antibody Comments - Luminex NO DSA DETECTED     Class II Antibody Comments - Luminex DSA DETECTED: DP1(7623)--DP1 IN PRE TRANSPLANT HISTORY, NO SIGNIFICANT MFI CHANGE       Lab Results   Component Value Date    CREATININE 1.4 01/13/2025      Latest Reference Range & Units POD 88,  Kidney-Post 1 Year  01/13/25 07:31   eGFR >60 mL/min/1.73 m^2 57.9 !      DM 2  -cont insulin/med regime as prescribed, mgmt deferred to endocrinology   Lab Results   Component Value Date    HGBA1C 4.8 10/16/2024       3. Hypertension management:   - advise low salt diet and home BP monitoring    - Cont coreg , clonidine, and nifedipine as prescribed    -cont Eliquis as prescribed     4.  Metabolites: Stable, no intervention warranted    -  KPN WNL   Cont    Lab Results   Component Value Date    .5 (H) 10/21/2024    CALCIUM 8.9 01/13/2025    PHOS 2.7 01/13/2025      Latest Reference Range & Units POD  88,  Kidney-Post 1 Year  01/13/25 07:31   Magnesium  1.6 - 2.6 mg/dL 1.5 (L)        5.  Electrolytes: Stable, no intervention warranted    -  K+ WNL  -cont sodium bicarb as prescribed   Lab Results   Component Value Date     01/13/2025    K 4.2 01/13/2025     01/13/2025    CO2 23 01/13/2025       6. Anemia: H/H stable. No need for intervention   Leucopenia:   ANC 1900        Lab Results   Component Value Date    WBC 2.64 (L) 01/13/2025    HGB 11.2 (L) 01/13/2025    HCT 36.9 (L) 01/13/2025    MCV 85 01/13/2025     01/13/2025       7. Proteinuria: continue p/c ratio as per guidelines        Latest Reference Range & Units POD 88,  Kidney-Post 1 Year  01/13/25 07:31   Urine Protein/Creatinine Ratio 0.00 - 0.20  Unable to calculate        8. CMV and BK virus infection screening:  BK PCR per protocol        Latest Reference Range & Units POD 60,  Kidney-Post 1 Year  12/16/24 07:25   Cytomegalovirus PCR, Quant <50 IU/mL Not Detected      Latest Reference Range & Units POD 60,  Kidney-Post 1 Year  12/16/24 07:25   BK Virus DNA, Blood Not detected  Not detected      Latest Reference Range & Units POD 60,  Kidney-Post 1 Year  12/16/24 07:25   BK Virus DNA PCR, Quant, Blood <125 Copies/mL <125       9. Weight education: provided during the clinic visit   Body mass index is 27.04 kg/m².     10. Patient safety education regarding immunosuppression including prophylaxis posttransplant for CMV, PCP  - PJP prophylaxis: Bactrim until 4/17/25- ON HOLD FOR HYPERKALEMIA, NEEDS G6PD next labs  - CMV prophylaxis: Valcyte until 1/17/25         Follow-up:   Clinic: return to transplant clinic weekly for the first month after transplant; every 2 weeks during months 2-3; then at 6-, 9-, 12-, 18-, 24-, and 36- months post-transplant to reassess for complications from immunosuppression toxicity and monitor for rejection.  Annually thereafter.    Labs: since patient remains at high risk for rejection and drug-related  complications that warrant close monitoring, labs will be ordered as follows: continue twice weekly CBC, renal panel, and drug level for first month; then same labs once weekly through 3rd month post-transplant.  Urine for UA and protein/creatinine ratio monthly.  Serum BK - PCR at 1-, 3-, 6-, 9-, 12-, 18-, 24-, 36-, 48-, and 60 months post-transplant.  Hepatic panel at 1-, 2-, 3-, 6-, 9-, 12-, 18-, 24-, and 36- months post-transplant.    Rimma Owens, NP

## 2025-01-15 ENCOUNTER — LAB VISIT (OUTPATIENT)
Dept: LAB | Facility: HOSPITAL | Age: 60
End: 2025-01-15
Payer: COMMERCIAL

## 2025-01-15 ENCOUNTER — OFFICE VISIT (OUTPATIENT)
Dept: TRANSPLANT | Facility: CLINIC | Age: 60
End: 2025-01-15
Payer: COMMERCIAL

## 2025-01-15 ENCOUNTER — PATIENT MESSAGE (OUTPATIENT)
Dept: TRANSPLANT | Facility: CLINIC | Age: 60
End: 2025-01-15

## 2025-01-15 VITALS
WEIGHT: 172.63 LBS | SYSTOLIC BLOOD PRESSURE: 151 MMHG | BODY MASS INDEX: 27.09 KG/M2 | DIASTOLIC BLOOD PRESSURE: 69 MMHG | RESPIRATION RATE: 18 BRPM | TEMPERATURE: 98 F | OXYGEN SATURATION: 99 % | HEART RATE: 71 BPM | HEIGHT: 67 IN

## 2025-01-15 DIAGNOSIS — Z94.0 STATUS POST DECEASED-DONOR KIDNEY TRANSPLANTATION: ICD-10-CM

## 2025-01-15 DIAGNOSIS — Z94.0 STATUS POST DECEASED-DONOR KIDNEY TRANSPLANTATION: Primary | ICD-10-CM

## 2025-01-15 DIAGNOSIS — N18.30 CONTROLLED TYPE 2 DIABETES MELLITUS WITH STAGE 3 CHRONIC KIDNEY DISEASE, WITHOUT LONG-TERM CURRENT USE OF INSULIN: Chronic | ICD-10-CM

## 2025-01-15 DIAGNOSIS — N18.31 STAGE 3A CHRONIC KIDNEY DISEASE: ICD-10-CM

## 2025-01-15 DIAGNOSIS — Z79.60 LONG-TERM USE OF IMMUNOSUPPRESSANT MEDICATION: ICD-10-CM

## 2025-01-15 DIAGNOSIS — D63.1 ANEMIA IN STAGE 3A CHRONIC KIDNEY DISEASE: ICD-10-CM

## 2025-01-15 DIAGNOSIS — N18.31 ANEMIA IN STAGE 3A CHRONIC KIDNEY DISEASE: ICD-10-CM

## 2025-01-15 DIAGNOSIS — Z29.89 PROPHYLACTIC IMMUNOTHERAPY: ICD-10-CM

## 2025-01-15 DIAGNOSIS — I15.0 RENOVASCULAR HYPERTENSION: ICD-10-CM

## 2025-01-15 DIAGNOSIS — E11.22 CONTROLLED TYPE 2 DIABETES MELLITUS WITH STAGE 3 CHRONIC KIDNEY DISEASE, WITHOUT LONG-TERM CURRENT USE OF INSULIN: Chronic | ICD-10-CM

## 2025-01-15 PROCEDURE — 99999 PR PBB SHADOW E&M-EST. PATIENT-LVL V: CPT | Mod: PBBFAC,,, | Performed by: NURSE PRACTITIONER

## 2025-01-15 PROCEDURE — 3077F SYST BP >= 140 MM HG: CPT | Mod: CPTII,S$GLB,, | Performed by: NURSE PRACTITIONER

## 2025-01-15 PROCEDURE — 1159F MED LIST DOCD IN RCRD: CPT | Mod: CPTII,S$GLB,, | Performed by: NURSE PRACTITIONER

## 2025-01-15 PROCEDURE — 82955 ASSAY OF G6PD ENZYME: CPT | Performed by: NURSE PRACTITIONER

## 2025-01-15 PROCEDURE — 3008F BODY MASS INDEX DOCD: CPT | Mod: CPTII,S$GLB,, | Performed by: NURSE PRACTITIONER

## 2025-01-15 PROCEDURE — 3078F DIAST BP <80 MM HG: CPT | Mod: CPTII,S$GLB,, | Performed by: NURSE PRACTITIONER

## 2025-01-15 PROCEDURE — 36415 COLL VENOUS BLD VENIPUNCTURE: CPT | Performed by: NURSE PRACTITIONER

## 2025-01-15 PROCEDURE — 1160F RVW MEDS BY RX/DR IN RCRD: CPT | Mod: CPTII,S$GLB,, | Performed by: NURSE PRACTITIONER

## 2025-01-15 PROCEDURE — 99215 OFFICE O/P EST HI 40 MIN: CPT | Mod: S$GLB,,, | Performed by: NURSE PRACTITIONER

## 2025-01-15 NOTE — LETTER
January 15, 2025        Kamran Adorno  [address] [facility]  [address]  [place] [address]  Phone: [phone]  Fax: [phone]             [facility]- Transplant 1st Fl  [address]  [facility] [address]  Phone: [phone]   Patient: Chidi Lisa   MR Number: [id number]   YOB: 1965   Date of Visit: 1/15/2025       Dear Dr. Kamran Adorno    Thank you for referring Chidi Lisa to me for evaluation. Attached you will find relevant portions of my assessment and plan of care.    If you have questions, please do not hesitate to call me. I look forward to following Chidi Lisa along with you.    Sincerely,    Rimma Owens, NP    Enclosure    If you would like to receive this communication electronically, please contact [email or web address] or [phone] to request [employer] access.    [employer] is a tool which provides read-only access to select patient information with whom you have a relationship. Its easy to use and provides real time access to review your patients record including encounter summaries, notes, results, and demographic information.    If you feel you have received this communication in error or would no longer like to receive these types of communications, please e-mail [email or web address]

## 2025-01-16 LAB — G6PD RBC-CCNT: 14.3 U/G HB (ref 8–11.9)

## 2025-01-17 RX ORDER — DAPSONE 100 MG/1
100 TABLET ORAL DAILY
Qty: 30 TABLET | Refills: 2 | Status: SHIPPED | OUTPATIENT
Start: 2025-01-17 | End: 2025-04-17

## 2025-01-17 NOTE — TELEPHONE ENCOUNTER
Patient repeated back and voice a understanding of orders.    ----- Message from Rimma Owens NP sent at 1/17/2025 10:51 AM CST -----  Result reviewed, Action needed.   G6PD acceptable--OK to start  Dapsone 100 mg Qd for PCP prophylaxis

## 2025-01-28 DIAGNOSIS — E11.21 CONTROLLED TYPE 2 DIABETES MELLITUS WITH DIABETIC NEPHROPATHY, WITHOUT LONG-TERM CURRENT USE OF INSULIN: ICD-10-CM

## 2025-04-08 NOTE — PROGRESS NOTES
Kidney Post-Transplant Assessment    Referring Physician: Kamran Adorno  Current Nephrologist: Kamran Adorno    ORGAN: RIGHT KIDNEY  Donor Type: donation after brain death  PHS Increased Risk: no  Cold Ischemia: 653 mins  Induction Medications: thymo    Subjective:     CC:  Reassessment of renal allograft function and management of chronic immunosuppression.    HPI:  Mr. Lisa is a 59 y.o. 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 He has CKD stage 3 - GFR 30-59 and his baseline creatinine is between 1.5-1.6. He takes mycophenolic acid, prednisone, and tacrolimus for maintenance immunosuppression.     Feels well today without complaints. Planning to restart a multivitamin but wanted to get it checked by transplant first. Has been seeing endocrinology for DM management. Staying hydrated, no problems with urination. Home -130, no peripheral edema. Energy levels and appetite good. Tolerating IS without issue, no diarrhea or vomiting.     Current Outpatient Medications   Medication Sig Dispense Refill    apixaban (ELIQUIS) 2.5 mg Tab Take 1 tablet (2.5 mg total) by mouth 2 (two) times daily.      atorvastatin (LIPITOR) 40 MG tablet Take 40 mg by mouth every evening.       carvediloL (COREG) 12.5 MG tablet Take 1 tablet (12.5 mg total) by mouth 2 (two) times daily with meals. 180 tablet 3    cloNIDine (CATAPRES) 0.1 MG tablet Take 1 tablet (0.1 mg total) by mouth 3 (three) times daily. 90 tablet 11    dapsone 100 MG Tab Take 1 tablet (100 mg total) by mouth once daily. 30 tablet 2    famotidine (PEPCID) 20 MG tablet Take 1 tablet (20 mg total) by mouth once daily. (Patient taking differently: Take 20 mg by mouth daily as needed.) 30 tablet 2    insulin aspart U-100 (NOVOLOG FLEXPEN U-100 INSULIN) 100 unit/mL (3 mL) InPn pen  "Inject 3 Units into the skin 3 (three) times daily with meals. May also inject 0-5 Units as needed for hyperglycemia. Add correction scale if needed. Blood sugar 180-230 add 1 units, 231-280 add 2 units, 281-330 add 3 units, 331-380 add 4 units, <380 add 5 units. TTD 24u 6 mL 11    mycophenolate sodium (MYFORTIC) 180 MG TbEC Take 4 tablets (720 mg total) by mouth 2 (two) times daily. 240 tablet 11    NIFEdipine (PROCARDIA-XL) 60 MG (OSM) 24 hr tablet Take 1 tablet (60 mg total) by mouth 2 (two) times a day. 60 tablet 5    pen needle, diabetic 32 gauge x 5/32" Ndle Use 1 each 3 (three) times daily. 100 each 5    predniSONE (DELTASONE) 5 MG tablet Take 1 tablet (5 mg total) by mouth once daily. 30 tablet 11    SITagliptin phosphate (JANUVIA) 100 MG Tab Take 1 tablet (100 mg total) by mouth once daily. 90 tablet 3    sodium bicarbonate 650 MG tablet Take 2 tablets (1,300 mg total) by mouth 3 (three) times daily. 180 tablet 11    tacrolimus (PROGRAF) 1 MG Cap Take 3 capsules (3 mg total) by mouth every 12 (twelve) hours. 180 capsule 11    multivitamin Tab Take 1 tablet by mouth once daily. (Patient not taking: Reported on 4/11/2025)       No current facility-administered medications for this visit.       Past Medical History:   Diagnosis Date    Anticoagulant long-term use     Arthritis     Carotid artery disease     CKD stage 3b, GFR 30-44 ml/min 11/04/2024    Diabetes mellitus     Dialysis patient     [facility]- L UPPER ARM AV SHUNT    Gout, unspecified     Hypertension     Immunosuppressive management encounter following kidney transplant 11/04/2024    Renal disorder     Stroke 2016    TIA    TIA (transient ischemic attack) 10/04/2019     Review of Systems   Constitutional:  Negative for activity change and fever.   Eyes:  Positive for visual disturbance.   Respiratory:  Negative for cough and shortness of breath.    Cardiovascular:  Negative for chest pain and leg swelling. " "  Gastrointestinal:  Negative for abdominal pain, constipation, diarrhea and nausea.   Genitourinary:  Negative for difficulty urinating, frequency and hematuria.   Musculoskeletal:  Negative for arthralgias and myalgias.   Skin:  Negative for wound.   Allergic/Immunologic: Positive for immunocompromised state.   Neurological:  Negative for weakness.   Hematological:  Bruises/bleeds easily.        On eliquis    Psychiatric/Behavioral:  Negative for sleep disturbance.        Objective:   Blood pressure (!) 144/65, pulse (!) 58, temperature 97.3 °F (36.3 °C), temperature source Temporal, resp. rate 18, height 5' 7.01" (1.702 m), weight 77.6 kg (171 lb 1.2 oz), SpO2 97%.body mass index is 26.79 kg/m².    Physical Exam  Vitals and nursing note reviewed.   Constitutional:       Appearance: Normal appearance.   Cardiovascular:      Rate and Rhythm: Normal rate and regular rhythm.      Heart sounds: Normal heart sounds.   Pulmonary:      Effort: Pulmonary effort is normal.      Breath sounds: Normal breath sounds.   Abdominal:      General: There is no distension.   Musculoskeletal:         General: Normal range of motion.   Skin:     General: Skin is warm and dry.   Neurological:      Mental Status: He is alert.         Labs:  Lab Results   Component Value Date    WBC 2.87 (L) 2025    HGB 10.0 (L) 2025    HCT 33.9 (L) 2025     2025    K 4.6 2025     2025    CO2 22 (L) 2025    BUN 20 2025    CREATININE 1.5 (H) 2025    EGFRNORACEVR 53 (L) 2025    GLUCOSE 158 (H) 2025    CALCIUM 8.9 2025    PHOS 2.9 2025    MG 1.6 2025    ALBUMIN 3.9 2025    AST 23 2025    ALT 17 2025    UTPCR  2025      Comment:      UNABLE TO CALCULATE    .5 (H) 10/21/2024    TACROLIMUS 7.8 2025     Labs were reviewed with the patient    Assessment:     1. Status post -donor kidney transplantation    2. Long-term use " of immunosuppressant medication    3. Stage 3a chronic kidney disease    4. Renovascular hypertension    5. At risk for opportunistic infections      Plan:       1. Immunosuppression: Prograf trough 7.8, which is therapeutic (goal 7-9). Continue Prograf 3/3, MyF 720 mg BID, and Prednisone 5 mg QD. Will continue to monitor for drug toxicities    2. Allograft Function: CKD IIIa, continue good oral hydration   - ESRD secondary to DM s/p donation after brain death kidney transplant on 10/17/24 (CIT 10h 53m, CMV + +, KDPI 50%.) .   - post transplant course uncomplicated to date.  - baseline creatinine is between 1.5-1.6.  Lab Results   Component Value Date    CREATININE 1.5 (H) 04/04/2025       3. Hypertension management:   - advise low salt diet and home BP monitoring    - coreg 12.5 mg BID, clonidine 0.1 mg TID, nifedipine 60 mg BID    4. Hypophosphatemia     - improving, continue high phosphorous diet    5. Hyperkalemia   - low K diet, good hydration     6. Anemia: stable. No need for intervention   Lab Results   Component Value Date    WBC 2.87 (L) 04/04/2025    HGB 10.0 (L) 04/04/2025    HCT 33.9 (L) 04/04/2025    MCV 86 04/04/2025     04/04/2025         7. Proteinuria: continue p/c ratio as per guidelines    - last UPC unable to calculate     8. BK virus infection screening:  BK PCR per protocol    - last PCR not detected    9. Weight education: provided during the clinic visit   Body mass index is 26.79 kg/m².     10. Patient safety education regarding immunosuppression including prophylaxis posttransplant for CMV, PCP  - PJP prophylaxis: dapsone until 4/17/25   - CMV prophylaxis: Valcyte until 1/17/25 - completed         Follow-up:   Clinic: return to transplant clinic weekly for the first month after transplant; every 2 weeks during months 2-3; then at 6-, 9-, 12-, 18-, 24-, and 36- months post-transplant to reassess for complications from immunosuppression toxicity and monitor for rejection.  Annually  thereafter.    Labs: since patient remains at high risk for rejection and drug-related complications that warrant close monitoring, labs will be ordered as follows: continue twice weekly CBC, renal panel, and drug level for first month; then same labs once weekly through 3rd month post-transplant.  Urine for UA and protein/creatinine ratio monthly.  Serum BK - PCR at 1-, 3-, 6-, 9-, 12-, 18-, 24-, 36-, 48-, and 60 months post-transplant.  Hepatic panel at 1-, 2-, 3-, 6-, 9-, 12-, 18-, 24-, and 36- months post-transplant.    Mabel Bautista, NP

## 2025-04-11 ENCOUNTER — TELEPHONE (OUTPATIENT)
Dept: ENDOCRINOLOGY | Facility: HOSPITAL | Age: 60
End: 2025-04-11
Payer: COMMERCIAL

## 2025-04-11 ENCOUNTER — OFFICE VISIT (OUTPATIENT)
Dept: TRANSPLANT | Facility: CLINIC | Age: 60
End: 2025-04-11
Payer: COMMERCIAL

## 2025-04-11 VITALS
RESPIRATION RATE: 18 BRPM | WEIGHT: 171.06 LBS | OXYGEN SATURATION: 97 % | HEART RATE: 58 BPM | HEIGHT: 67 IN | SYSTOLIC BLOOD PRESSURE: 144 MMHG | BODY MASS INDEX: 26.85 KG/M2 | DIASTOLIC BLOOD PRESSURE: 65 MMHG | TEMPERATURE: 97 F

## 2025-04-11 DIAGNOSIS — N18.31 STAGE 3A CHRONIC KIDNEY DISEASE: ICD-10-CM

## 2025-04-11 DIAGNOSIS — Z94.0 STATUS POST DECEASED-DONOR KIDNEY TRANSPLANTATION: Primary | ICD-10-CM

## 2025-04-11 DIAGNOSIS — Z79.60 LONG-TERM USE OF IMMUNOSUPPRESSANT MEDICATION: ICD-10-CM

## 2025-04-11 DIAGNOSIS — I15.0 RENOVASCULAR HYPERTENSION: ICD-10-CM

## 2025-04-11 DIAGNOSIS — Z91.89 AT RISK FOR OPPORTUNISTIC INFECTIONS: ICD-10-CM

## 2025-04-11 PROCEDURE — 99999 PR PBB SHADOW E&M-EST. PATIENT-LVL V: CPT | Mod: PBBFAC,,, | Performed by: NURSE PRACTITIONER

## 2025-04-11 RX ORDER — INSULIN LISPRO 100 [IU]/ML
3 INJECTION, SOLUTION INTRAVENOUS; SUBCUTANEOUS
Qty: 2.7 ML | Refills: 11 | Status: SHIPPED | OUTPATIENT
Start: 2025-04-11 | End: 2025-04-11 | Stop reason: SDUPTHER

## 2025-04-11 RX ORDER — INSULIN LISPRO 100 [IU]/ML
3 INJECTION, SOLUTION INTRAVENOUS; SUBCUTANEOUS
Qty: 2.7 ML | Refills: 11 | Status: SHIPPED | OUTPATIENT
Start: 2025-04-11 | End: 2026-04-11

## 2025-04-11 NOTE — LETTER
April 11, 2025        Kamran Adorno  [address] [facility]  [address]  [place] [address]  Phone: [phone]  Fax: [phone]             [facility]- Transplant 1st Fl  [address]  [facility] [address]  Phone: [phone]   Patient: Chidi Lisa   MR Number: [id number]   YOB: 1965   Date of Visit: 4/11/2025       Dear Dr. Kamran Adorno    Thank you for referring Chidi Lisa to me for evaluation. Attached you will find relevant portions of my assessment and plan of care.    If you have questions, please do not hesitate to call me. I look forward to following Chidi Lisa along with you.    Sincerely,    Mabel Bautista NP    Enclosure    If you would like to receive this communication electronically, please contact [email or web address] or [phone] to request [employer] access.    [employer] is a tool which provides read-only access to select patient information with whom you have a relationship. Its easy to use and provides real time access to review your patients record including encounter summaries, notes, results, and demographic information.    If you feel you have received this communication in error or would no longer like to receive these types of communications, please e-mail [email or web address]

## 2025-04-28 RX ORDER — INSULIN LISPRO 100 [IU]/ML
3 INJECTION, SOLUTION INTRAVENOUS; SUBCUTANEOUS
Qty: 3 ML | Refills: 11 | Status: SHIPPED | OUTPATIENT
Start: 2025-04-28 | End: 2026-04-28

## 2025-05-06 ENCOUNTER — RESULTS FOLLOW-UP (OUTPATIENT)
Dept: TRANSPLANT | Facility: HOSPITAL | Age: 60
End: 2025-05-06
Payer: COMMERCIAL

## 2025-05-06 ENCOUNTER — PATIENT MESSAGE (OUTPATIENT)
Dept: TRANSPLANT | Facility: CLINIC | Age: 60
End: 2025-05-06
Payer: COMMERCIAL

## 2025-05-06 DIAGNOSIS — Z94.0 STATUS POST KIDNEY TRANSPLANT: ICD-10-CM

## 2025-05-06 RX ORDER — TACROLIMUS 1 MG/1
2 CAPSULE ORAL EVERY 12 HOURS
Qty: 120 CAPSULE | Refills: 11 | Status: SHIPPED | OUTPATIENT
Start: 2025-05-06

## 2025-05-06 NOTE — TELEPHONE ENCOUNTER
Received written order from .  Messaged pt instructing him to lower Prograf to 2 mg twice a day and repeat a level in 2 weeks.  Asked pt to contact us with any questions.       ----- Message from Gianfranco Philip MD sent at 5/6/2025 10:44 AM CDT -----  Please lower prograf to 2 mg Po bid and repeat a level in 2 weeks   ----- Message -----  From: Lab, Background User  Sent: 5/5/2025   8:26 AM CDT  To: Gianfranco Philip MD

## 2025-05-20 ENCOUNTER — PATIENT MESSAGE (OUTPATIENT)
Dept: TRANSPLANT | Facility: CLINIC | Age: 60
End: 2025-05-20
Payer: COMMERCIAL

## 2025-05-20 ENCOUNTER — RESULTS FOLLOW-UP (OUTPATIENT)
Dept: TRANSPLANT | Facility: HOSPITAL | Age: 60
End: 2025-05-20

## 2025-05-28 ENCOUNTER — RESULTS FOLLOW-UP (OUTPATIENT)
Dept: TRANSPLANT | Facility: CLINIC | Age: 60
End: 2025-05-28

## 2025-06-02 ENCOUNTER — RESULTS FOLLOW-UP (OUTPATIENT)
Dept: TRANSPLANT | Facility: CLINIC | Age: 60
End: 2025-06-02

## 2025-06-02 DIAGNOSIS — Z94.0 STATUS POST KIDNEY TRANSPLANT: ICD-10-CM

## 2025-06-04 ENCOUNTER — PATIENT MESSAGE (OUTPATIENT)
Dept: TRANSPLANT | Facility: CLINIC | Age: 60
End: 2025-06-04
Payer: COMMERCIAL

## 2025-06-17 DIAGNOSIS — R73.9 STEROID-INDUCED HYPERGLYCEMIA: ICD-10-CM

## 2025-06-17 DIAGNOSIS — T38.0X5A STEROID-INDUCED HYPERGLYCEMIA: ICD-10-CM

## 2025-06-17 DIAGNOSIS — E11.21 CONTROLLED TYPE 2 DIABETES MELLITUS WITH DIABETIC NEPHROPATHY, WITHOUT LONG-TERM CURRENT USE OF INSULIN: Primary | ICD-10-CM

## 2025-06-17 DIAGNOSIS — E11.21 CONTROLLED TYPE 2 DIABETES MELLITUS WITH DIABETIC NEPHROPATHY, WITHOUT LONG-TERM CURRENT USE OF INSULIN: ICD-10-CM

## 2025-06-17 RX ORDER — INSULIN LISPRO 100 [IU]/ML
5 INJECTION, SOLUTION INTRAVENOUS; SUBCUTANEOUS
Qty: 4.5 ML | Refills: 11 | Status: SHIPPED | OUTPATIENT
Start: 2025-06-17 | End: 2025-06-17 | Stop reason: SDUPTHER

## 2025-06-17 RX ORDER — BLOOD-GLUCOSE SENSOR
EACH MISCELLANEOUS
Qty: 9 EACH | Refills: 3 | Status: SHIPPED | OUTPATIENT
Start: 2025-06-17

## 2025-06-17 RX ORDER — INSULIN LISPRO 100 [IU]/ML
5 INJECTION, SOLUTION INTRAVENOUS; SUBCUTANEOUS
Qty: 4.5 ML | Refills: 11 | Status: SHIPPED | OUTPATIENT
Start: 2025-06-17 | End: 2026-06-17

## 2025-06-30 DIAGNOSIS — E11.21 CONTROLLED TYPE 2 DIABETES MELLITUS WITH DIABETIC NEPHROPATHY, WITHOUT LONG-TERM CURRENT USE OF INSULIN: ICD-10-CM

## 2025-06-30 DIAGNOSIS — T38.0X5A STEROID-INDUCED HYPERGLYCEMIA: ICD-10-CM

## 2025-06-30 DIAGNOSIS — R73.9 STEROID-INDUCED HYPERGLYCEMIA: ICD-10-CM

## 2025-06-30 RX ORDER — INSULIN LISPRO 100 [IU]/ML
5 INJECTION, SOLUTION INTRAVENOUS; SUBCUTANEOUS
Qty: 4.5 ML | Refills: 11 | Status: SHIPPED | OUTPATIENT
Start: 2025-06-30 | End: 2026-06-30

## 2025-07-01 ENCOUNTER — TELEPHONE (OUTPATIENT)
Dept: ENDOCRINOLOGY | Facility: CLINIC | Age: 60
End: 2025-07-01
Payer: COMMERCIAL

## 2025-07-01 NOTE — TELEPHONE ENCOUNTER
Copied from CRM #[id number]. Topic: Medications - Medication Authorization  >> Jun 26, 2025 10:26 AM Edita wrote:  Name of Pharmacy:    [id number] - [place], [place] - [address] AT [facility] [address] [facility]  [address] [facility] [place] [address]  Phone: [phone] Fax: [phone]      Name Of caller:  Chidi      Name of Medication:  Requesting  Dexcom G7,   blood-glucose sensor (DEXCOM G7 SENSOR) Bridgette, and transmitter     Comments/advisory:  Prior authorization is needed

## 2025-07-02 DIAGNOSIS — E11.21 CONTROLLED TYPE 2 DIABETES MELLITUS WITH DIABETIC NEPHROPATHY, WITHOUT LONG-TERM CURRENT USE OF INSULIN: ICD-10-CM

## 2025-07-02 DIAGNOSIS — T38.0X5A STEROID-INDUCED HYPERGLYCEMIA: Primary | ICD-10-CM

## 2025-07-02 DIAGNOSIS — R73.9 STEROID-INDUCED HYPERGLYCEMIA: Primary | ICD-10-CM

## 2025-07-02 RX ORDER — BLOOD-GLUCOSE,RECEIVER,CONT
EACH MISCELLANEOUS
Qty: 1 EACH | Refills: 0 | Status: SHIPPED | OUTPATIENT
Start: 2025-07-02

## 2025-07-22 ENCOUNTER — PATIENT MESSAGE (OUTPATIENT)
Dept: TRANSPLANT | Facility: CLINIC | Age: 60
End: 2025-07-22
Payer: COMMERCIAL

## 2025-07-22 NOTE — TELEPHONE ENCOUNTER
----- Message from Marylou Mays sent at 7/21/2025  8:55 AM CDT -----    ----- Message -----  From: Gianfranco Phiilp MD  Sent: 7/21/2025   8:29 AM CDT  To: [facility] Post-Kidney Transplant Clinical    Please let's increase prograf to 3/2 and repeat a tacro level in 2 weeks Thanks   ----- Message -----  From: Lab, Background User  Sent: 7/20/2025   7:18 AM CDT  To: Gianfranco Philip MD

## 2025-07-22 NOTE — PROGRESS NOTES
Kidney Post-Transplant Assessment    Referring Physician: Kamran Adorno  Current Nephrologist: Kamran Adorno    ORGAN: RIGHT KIDNEY  Donor Type: donation after brain death  PHS Increased Risk: no  Cold Ischemia: 653 mins  Induction Medications: thymo    Subjective:     CC:  Reassessment of renal allograft function and management of chronic immunosuppression.    HPI:  Mr. Lisa is a 59 y.o. 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 He has CKD stage 3 - GFR 30-59 and his baseline creatinine is between 1.5-1.6. He takes mycophenolic acid, prednisone, and tacrolimus for maintenance immunosuppression.     Now 9 months post kidney transplant and doing excellent. He is very pleased with his transplant and new life. Energy levels great. He plans to re-establish with general nephrology Dr. Adorno after his 1 year anniversary. He has stopped taking coq10 and multivitamin as was upsetting his stomach. BP excellent, 124/60 in clinic. Blood sugars have been stable and he has been following with endocrinology for med adjustments. No peripheral edema. Tolerating IS without issue, no diarrhea or vomiting.     Current Outpatient Medications   Medication Sig Dispense Refill    apixaban (ELIQUIS) 2.5 mg Tab Take 1 tablet (2.5 mg total) by mouth 2 (two) times daily.      atorvastatin (LIPITOR) 40 MG tablet Take 40 mg by mouth every evening.       blood-glucose sensor (DEXCOM G7 SENSOR) Bridgette Change every 10 days. 9 each 3    blood-glucose,,cont (DEXCOM G7 ) Misc Use as directed. 1 each 0    carvediloL (COREG) 12.5 MG tablet Take 1 tablet (12.5 mg total) by mouth 2 (two) times daily with meals. 180 tablet 3    cloNIDine (CATAPRES) 0.1 MG tablet Take 1 tablet (0.1 mg total) by mouth 3 (three) times daily. 90 tablet 11    insulin lispro  "(HUMALOG KWIKPEN INSULIN) 100 unit/mL pen Inject 5 Units into the skin 3 (three) times daily before meals. Use with correction scale: 180 - 230 + 1 unit, 231- 280  + 2 units, 281 - 330 + 3 units, 331 - 380 + 4 units, > 380   + 5 units Max daily dose of 32 units 4.5 mL 11    mycophenolate sodium (MYFORTIC) 180 MG TbEC Take 4 tablets (720 mg total) by mouth 2 (two) times daily. 240 tablet 11    NIFEdipine (PROCARDIA-XL) 60 MG (OSM) 24 hr tablet Take 1 tablet (60 mg total) by mouth 2 (two) times a day. 60 tablet 5    pen needle, diabetic 32 gauge x 5/32" Ndle Use 1 each 3 (three) times daily. 100 each 5    predniSONE (DELTASONE) 5 MG tablet Take 1 tablet (5 mg total) by mouth once daily. 30 tablet 11    SITagliptin phosphate (JANUVIA) 100 MG Tab Take 1 tablet (100 mg total) by mouth once daily. 90 tablet 3    sodium bicarbonate 650 MG tablet Take 2 tablets (1,300 mg total) by mouth 3 (three) times daily. 180 tablet 11    tacrolimus (PROGRAF) 1 MG Cap Take 3 capsules (3 mg total) by mouth every morning AND 2 capsules (2 mg total) every evening. 150 capsule 11    multivitamin Tab Take 1 tablet by mouth once daily. (Patient not taking: Reported on 7/25/2025)       No current facility-administered medications for this visit.       Past Medical History:   Diagnosis Date    Anticoagulant long-term use     Arthritis     Carotid artery disease     CKD stage 3b, GFR 30-44 ml/min 11/04/2024    Diabetes mellitus     Dialysis patient     [facility] FRESENIUS- L UPPER ARM AV SHUNT    Gout, unspecified     Hypertension     Immunosuppressive management encounter following kidney transplant 11/04/2024    Renal disorder     Stroke 2016    TIA    TIA (transient ischemic attack) 10/04/2019     Review of Systems   Constitutional:  Negative for activity change and fever.   Eyes:  Positive for visual disturbance.   Respiratory:  Negative for cough and shortness of breath.    Cardiovascular:  Negative for chest pain and leg " "swelling.   Gastrointestinal:  Negative for abdominal pain, constipation, diarrhea and nausea.   Genitourinary:  Negative for difficulty urinating, frequency and hematuria.   Musculoskeletal:  Negative for arthralgias and myalgias.   Skin:  Negative for wound.   Allergic/Immunologic: Positive for immunocompromised state.   Neurological:  Negative for weakness.   Hematological:  Bruises/bleeds easily.        On eliquis    Psychiatric/Behavioral:  Negative for sleep disturbance.      Objective:   Blood pressure 124/60, pulse (!) 55, temperature 97.7 °F (36.5 °C), temperature source Temporal, resp. rate 20, height 5' 7" (1.702 m), weight 77 kg (169 lb 12.1 oz), SpO2 98%.body mass index is 26.59 kg/m².    Physical Exam  Vitals and nursing note reviewed.   Constitutional:       Appearance: Normal appearance.   Cardiovascular:      Rate and Rhythm: Normal rate and regular rhythm.      Heart sounds: Normal heart sounds.   Pulmonary:      Effort: Pulmonary effort is normal.      Breath sounds: Normal breath sounds.   Abdominal:      General: There is no distension.   Musculoskeletal:         General: Normal range of motion.   Skin:     General: Skin is warm and dry.   Neurological:      Mental Status: He is alert.         Labs:  Lab Results   Component Value Date    WBC 3.55 (L) 2025    HGB 14.5 2025    HCT 48.6 2025     2025    K 4.4 2025     2025    CO2 25 2025    BUN 21 (H) 2025    CREATININE 1.5 (H) 2025    EGFRNORACEVR 53 (L) 2025    CALCIUM 8.9 2025    PHOS 3.2 2025    MG 1.8 2025    ALBUMIN 3.7 2025    AST 23 2025    ALT 17 2025    UTPCR  2025      Comment:      UNABLE TO CALCULATE    .5 (H) 10/21/2024    TACROLIMUS 6.6 2025     Labs were reviewed with the patient    Assessment:     1. Status post -donor kidney transplantation    2. Long-term use of immunosuppressant medication    3. " Stage 3a chronic kidney disease    4. Renovascular hypertension    5. At risk for opportunistic infections      Plan:     CMV PCR <30, repeating 1 week      1. Immunosuppression: Prograf trough 6.6, which is SUBtherapeutic- dose increased 7/21. Continue Prograf 3/2, MyF 720 mg BID, and Prednisone 5 mg QD. Will continue to monitor for drug toxicities    2. Allograft Function: CKD IIIa, continue good oral hydration   - ESRD secondary to DM s/p donation after brain death kidney transplant on 10/17/24 (CIT 10h 53m, CMV + +, KDPI 50%.) .   - post transplant course uncomplicated to date.  - baseline creatinine is between 1.5-1.6.  Lab Results   Component Value Date    CREATININE 1.5 (H) 07/20/2025       3. Hypertension management:   - advise low salt diet and home BP monitoring    - coreg 12.5 mg BID, clonidine 0.1 mg TID, nifedipine 60 mg BID    4. Hypophosphatemia     - improving, continue high phosphorous diet    5. Hyperkalemia   - low K diet, good hydration     6. Anemia: stable. No need for intervention   Lab Results   Component Value Date    WBC 3.55 (L) 07/20/2025    HGB 14.5 07/20/2025    HCT 48.6 07/20/2025    MCV 82 07/20/2025     07/20/2025         7. Proteinuria: continue p/c ratio as per guidelines    - last UPC unable to calculate     8. BK virus infection screening:  BK PCR per protocol    - last PCR not detected    9. CMV virus screening   - last PCR < 30   - repeating in 1 week    10. Patient safety education regarding immunosuppression including prophylaxis posttransplant for CMV, PCP  - PJP prophylaxis: dapsone until 4/17/25 - completed  - CMV prophylaxis: Valcyte until 1/17/25 - completed         Follow-up:   Clinic: return to transplant clinic weekly for the first month after transplant; every 2 weeks during months 2-3; then at 6-, 9-, 12-, 18-, 24-, and 36- months post-transplant to reassess for complications from immunosuppression toxicity and monitor for rejection.  Annually  thereafter.    Labs: since patient remains at high risk for rejection and drug-related complications that warrant close monitoring, labs will be ordered as follows: continue twice weekly CBC, renal panel, and drug level for first month; then same labs once weekly through 3rd month post-transplant.  Urine for UA and protein/creatinine ratio monthly.  Serum BK - PCR at 1-, 3-, 6-, 9-, 12-, 18-, 24-, 36-, 48-, and 60 months post-transplant.  Hepatic panel at 1-, 2-, 3-, 6-, 9-, 12-, 18-, 24-, and 36- months post-transplant.    Mabel Bautista, NP

## 2025-07-23 RX ORDER — TACROLIMUS 1 MG/1
CAPSULE ORAL
Qty: 150 CAPSULE | Refills: 11 | Status: SHIPPED | OUTPATIENT
Start: 2025-07-23

## 2025-07-25 ENCOUNTER — OFFICE VISIT (OUTPATIENT)
Dept: TRANSPLANT | Facility: CLINIC | Age: 60
End: 2025-07-25
Payer: COMMERCIAL

## 2025-07-25 VITALS
HEIGHT: 67 IN | BODY MASS INDEX: 26.64 KG/M2 | OXYGEN SATURATION: 98 % | TEMPERATURE: 98 F | DIASTOLIC BLOOD PRESSURE: 60 MMHG | RESPIRATION RATE: 20 BRPM | WEIGHT: 169.75 LBS | SYSTOLIC BLOOD PRESSURE: 124 MMHG | HEART RATE: 55 BPM

## 2025-07-25 DIAGNOSIS — R73.9 STEROID-INDUCED HYPERGLYCEMIA: ICD-10-CM

## 2025-07-25 DIAGNOSIS — Z94.0 STATUS POST DECEASED-DONOR KIDNEY TRANSPLANTATION: Primary | ICD-10-CM

## 2025-07-25 DIAGNOSIS — N18.31 STAGE 3A CHRONIC KIDNEY DISEASE: ICD-10-CM

## 2025-07-25 DIAGNOSIS — T38.0X5A STEROID-INDUCED HYPERGLYCEMIA: ICD-10-CM

## 2025-07-25 DIAGNOSIS — Z91.89 AT RISK FOR OPPORTUNISTIC INFECTIONS: ICD-10-CM

## 2025-07-25 DIAGNOSIS — I15.0 RENOVASCULAR HYPERTENSION: ICD-10-CM

## 2025-07-25 DIAGNOSIS — E11.21 CONTROLLED TYPE 2 DIABETES MELLITUS WITH DIABETIC NEPHROPATHY, WITHOUT LONG-TERM CURRENT USE OF INSULIN: ICD-10-CM

## 2025-07-25 DIAGNOSIS — Z79.60 LONG-TERM USE OF IMMUNOSUPPRESSANT MEDICATION: ICD-10-CM

## 2025-07-25 PROCEDURE — 99999 PR PBB SHADOW E&M-EST. PATIENT-LVL IV: CPT | Mod: PBBFAC,,, | Performed by: NURSE PRACTITIONER

## 2025-07-25 NOTE — LETTER
July 25, 2025        Kamran Adorno  [address]  [address]  [place] [address]  Phone: [phone]  Fax: [phone]             [facility] Transplant 1st Fl  [address]  [facility] [address]  Phone: [phone]   Patient: Chidi Lisa   MR Number: [id number]   YOB: 1965   Date of Visit: 7/25/2025       Dear Dr. Kamran Adorno    Thank you for referring Chidi Lisa to me for evaluation. Attached you will find relevant portions of my assessment and plan of care.    If you have questions, please do not hesitate to call me. I look forward to following Chidi Lisa along with you.    Sincerely,    Mabel Bautista NP    Enclosure    If you would like to receive this communication electronically, please contact [email or web address] or [phone] to request [employer] access.    [employer] is a tool which provides read-only access to select patient information with whom you have a relationship. Its easy to use and provides real time access to review your patients record including encounter summaries, notes, results, and demographic information.    If you feel you have received this communication in error or would no longer like to receive these types of communications, please e-mail [email or web address]

## 2025-07-28 RX ORDER — BLOOD-GLUCOSE,RECEIVER,CONT
EACH MISCELLANEOUS
Qty: 1 EACH | Refills: 0 | Status: SHIPPED | OUTPATIENT
Start: 2025-07-28

## 2025-07-30 DIAGNOSIS — B25.9 CYTOMEGALOVIRUS (CMV) VIREMIA: Primary | ICD-10-CM

## (undated) DEVICE — PUNCH AORTIC 4.8MM

## (undated) DEVICE — SYR ONLY LUER LOCK 20CC

## (undated) DEVICE — SUT 2-0 12-18IN SILK

## (undated) DEVICE — SUT PDS BV 6-0

## (undated) DEVICE — DRAPE SLUSH WARMER WITH DISC

## (undated) DEVICE — CART STAPLE RELD 45MM WHT

## (undated) DEVICE — SUT PROLENE 5-0 36IN C-1

## (undated) DEVICE — STAPLER SKIN PROXIMATE WIDE

## (undated) DEVICE — Device

## (undated) DEVICE — PACK KIDNEY TRANSPLANT CUSTOM

## (undated) DEVICE — ELECTRODE REM PLYHSV RETURN 9

## (undated) DEVICE — STAPLER INT LINEAR ARTC 3.5-45

## (undated) DEVICE — SUT ETHILON 3-0 FS-1 30

## (undated) DEVICE — SEE MEDLINE ITEM 156894

## (undated) DEVICE — KIT MICROINTRODUCE MINI 5X10CM

## (undated) DEVICE — TOWEL OR XRAY WHITE 17X26IN

## (undated) DEVICE — TIP YANKAUERS BULB NO VENT

## (undated) DEVICE — SUT 4-0 12-18IN SILK BLACK

## (undated) DEVICE — SUT 4/0 27IN PDS II VIO MON

## (undated) DEVICE — PLUG CATHETER STERILE FOLEY

## (undated) DEVICE — SUT SILK 3-0 SH 18IN BLACK

## (undated) DEVICE — SUT PROLENE 6-0 BV-1 30IN

## (undated) DEVICE — SUT SILK 2-0 STRANDS 30IN

## (undated) DEVICE — PACK SET UP CONVERTORS

## (undated) DEVICE — SOL NACL 0.9% IV INJ 1000ML

## (undated) DEVICE — FOLEY BLLN 20FR 3WAY 5CC

## (undated) DEVICE — HEMOSTAT SURGICEL NU-KNIT 6X9

## (undated) DEVICE — ADHESIVE DERMABOND ADVANCED

## (undated) DEVICE — EVACUATOR WOUND BULB 100CC

## (undated) DEVICE — PUNCH AORTIC 4.0MM 6/CASE

## (undated) DEVICE — DRESSING ABSRBNT ISLAND 3.6X8

## (undated) DEVICE — SUT 1 36IN PDS II VIO MONO

## (undated) DEVICE — DECANTER FLUID TRNSF WHITE 9IN

## (undated) DEVICE — INSERTS STEALTH FIBRA SIZE 1.

## (undated) DEVICE — SET IRR URLGY 2LINE UNIV SPIKE

## (undated) DEVICE — DRESSING TRANS 4X4 TEGADERM

## (undated) DEVICE — TRAY CATH 1-LYR URIMTR 16FR

## (undated) DEVICE — CATH SWAN GANZ STND 7FR

## (undated) DEVICE — SHEATH INTRODUCER 7FR 11CM

## (undated) DEVICE — FIBRILLAR ABS HEMOSTAT 4X4

## (undated) DEVICE — SUT SILK 3-0 STRANDS 30IN

## (undated) DEVICE — CLIPPER BLADE MOD 4406 (CAREF)

## (undated) DEVICE — STOCKINETTE 2INX36

## (undated) DEVICE — KIT PROBE COVER WITH GEL